# Patient Record
Sex: MALE | Race: WHITE | NOT HISPANIC OR LATINO | Employment: FULL TIME | URBAN - METROPOLITAN AREA
[De-identification: names, ages, dates, MRNs, and addresses within clinical notes are randomized per-mention and may not be internally consistent; named-entity substitution may affect disease eponyms.]

---

## 2023-05-10 PROBLEM — I10 HTN (HYPERTENSION): Status: ACTIVE | Noted: 2023-05-10

## 2023-05-10 PROBLEM — G47.33 OSA ON CPAP: Status: ACTIVE | Noted: 2023-05-10

## 2023-05-10 PROBLEM — M70.20 OLECRANON BURSITIS: Status: ACTIVE | Noted: 2023-05-10

## 2023-05-10 PROBLEM — N13.8 BENIGN LOCALIZED HYPERPLASIA OF PROSTATE WITH URINARY OBSTRUCTION: Status: ACTIVE | Noted: 2023-05-10

## 2023-05-10 PROBLEM — N39.0 URINARY TRACT INFECTION: Status: ACTIVE | Noted: 2023-05-10

## 2023-05-10 PROBLEM — M25.519 SHOULDER PAIN: Status: ACTIVE | Noted: 2023-05-10

## 2023-05-10 PROBLEM — M17.12 LEFT KNEE DJD: Status: ACTIVE | Noted: 2023-05-10

## 2023-05-10 PROBLEM — E11.9 DM2 (DIABETES MELLITUS, TYPE 2) (MULTI): Status: ACTIVE | Noted: 2023-05-10

## 2023-05-10 PROBLEM — R82.89 ABNORMAL URINE CYTOLOGY: Status: ACTIVE | Noted: 2023-05-10

## 2023-05-10 PROBLEM — E78.2 HYPERLIPIDEMIA, MIXED: Status: ACTIVE | Noted: 2023-05-10

## 2023-05-10 PROBLEM — R06.02 EXERCISE-INDUCED SHORTNESS OF BREATH: Status: ACTIVE | Noted: 2023-05-10

## 2023-05-10 PROBLEM — R07.9 CHEST PAIN: Status: ACTIVE | Noted: 2023-05-10

## 2023-05-10 PROBLEM — L03.119 CELLULITIS OF FOREARM: Status: ACTIVE | Noted: 2023-05-10

## 2023-05-10 PROBLEM — I49.3 PVC (PREMATURE VENTRICULAR CONTRACTION): Status: ACTIVE | Noted: 2023-05-10

## 2023-05-10 PROBLEM — R35.1 NOCTURIA: Status: ACTIVE | Noted: 2023-05-10

## 2023-05-10 PROBLEM — R39.11 URINARY HESITANCY: Status: ACTIVE | Noted: 2023-05-10

## 2023-05-10 PROBLEM — N20.0 NEPHROLITHIASIS: Status: ACTIVE | Noted: 2023-05-10

## 2023-05-10 PROBLEM — R33.9 URINARY RETENTION: Status: ACTIVE | Noted: 2023-05-10

## 2023-05-10 PROBLEM — R97.20 ELEVATED PSA: Status: ACTIVE | Noted: 2023-05-10

## 2023-05-10 PROBLEM — M75.100 ROTATOR CUFF TEAR: Status: ACTIVE | Noted: 2023-05-10

## 2023-05-10 PROBLEM — E66.01 OBESITY, MORBID (MULTI): Status: ACTIVE | Noted: 2023-05-10

## 2023-05-10 PROBLEM — R89.7 ABNORMAL PROSTATE BIOPSY: Status: ACTIVE | Noted: 2023-05-10

## 2023-05-10 PROBLEM — M48.061 DEGENERATIVE LUMBAR SPINAL STENOSIS: Status: ACTIVE | Noted: 2023-05-10

## 2023-05-10 PROBLEM — R00.2 PALPITATIONS: Status: ACTIVE | Noted: 2023-05-10

## 2023-05-10 PROBLEM — N52.9 ERECTILE DYSFUNCTION: Status: ACTIVE | Noted: 2023-05-10

## 2023-05-10 PROBLEM — N40.1 BENIGN LOCALIZED HYPERPLASIA OF PROSTATE WITH URINARY OBSTRUCTION: Status: ACTIVE | Noted: 2023-05-10

## 2023-05-10 PROBLEM — M25.569 KNEE PAIN: Status: ACTIVE | Noted: 2023-05-10

## 2023-05-10 RX ORDER — SEMAGLUTIDE 1.34 MG/ML
1 INJECTION, SOLUTION SUBCUTANEOUS
COMMUNITY
Start: 2022-02-03 | End: 2024-04-02 | Stop reason: DRUGHIGH

## 2023-05-10 RX ORDER — LANOLIN ALCOHOL/MO/W.PET/CERES
400 CREAM (GRAM) TOPICAL DAILY
COMMUNITY
Start: 2022-05-09 | End: 2024-04-02 | Stop reason: WASHOUT

## 2023-05-10 RX ORDER — METFORMIN HYDROCHLORIDE 500 MG/1
500 TABLET ORAL
COMMUNITY
End: 2024-04-02 | Stop reason: WASHOUT

## 2023-05-10 RX ORDER — LOSARTAN POTASSIUM 25 MG/1
25 TABLET ORAL DAILY
COMMUNITY
Start: 2018-09-11

## 2023-05-10 RX ORDER — ROSUVASTATIN CALCIUM 10 MG/1
10 TABLET, COATED ORAL DAILY
COMMUNITY
Start: 2022-05-09

## 2023-05-10 RX ORDER — TIZANIDINE 4 MG/1
4 TABLET ORAL EVERY 8 HOURS PRN
COMMUNITY
Start: 2021-07-14

## 2023-05-10 RX ORDER — PROPRANOLOL HYDROCHLORIDE 20 MG/1
10 TABLET ORAL 2 TIMES DAILY
COMMUNITY
Start: 2018-12-20

## 2023-05-10 RX ORDER — GABAPENTIN 100 MG/1
100 CAPSULE ORAL 2 TIMES DAILY
COMMUNITY
Start: 2022-05-09 | End: 2023-05-17 | Stop reason: DRUGHIGH

## 2023-05-10 RX ORDER — CELECOXIB 200 MG/1
200 CAPSULE ORAL 2 TIMES DAILY
COMMUNITY

## 2023-05-14 ASSESSMENT — ENCOUNTER SYMPTOMS
SPEECH DIFFICULTY: 0
HUNGER: 0
WEIGHT LOSS: 0
FATIGUE: 0
SWEATS: 0
TREMORS: 0
POLYPHAGIA: 0
NERVOUS/ANXIOUS: 0
WEAKNESS: 0
DIZZINESS: 0
SEIZURES: 0
BLURRED VISION: 0
BLACKOUTS: 0
HEADACHES: 0
VISUAL CHANGE: 0
CONFUSION: 0
POLYDIPSIA: 0

## 2023-05-15 LAB — PROSTATE SPECIFIC AG (NG/ML) IN SER/PLAS: 1.08 NG/ML (ref 0–4)

## 2023-05-17 ENCOUNTER — OFFICE VISIT (OUTPATIENT)
Dept: PRIMARY CARE | Facility: CLINIC | Age: 73
End: 2023-05-17
Payer: MEDICARE

## 2023-05-17 VITALS
DIASTOLIC BLOOD PRESSURE: 79 MMHG | BODY MASS INDEX: 31.4 KG/M2 | HEART RATE: 76 BPM | OXYGEN SATURATION: 95 % | TEMPERATURE: 98.6 F | WEIGHT: 207.2 LBS | HEIGHT: 68 IN | SYSTOLIC BLOOD PRESSURE: 138 MMHG

## 2023-05-17 DIAGNOSIS — I49.3 PVC (PREMATURE VENTRICULAR CONTRACTION): ICD-10-CM

## 2023-05-17 DIAGNOSIS — R00.2 PALPITATIONS: ICD-10-CM

## 2023-05-17 DIAGNOSIS — N13.8 BENIGN LOCALIZED HYPERPLASIA OF PROSTATE WITH URINARY OBSTRUCTION: ICD-10-CM

## 2023-05-17 DIAGNOSIS — E78.2 HYPERLIPIDEMIA, MIXED: ICD-10-CM

## 2023-05-17 DIAGNOSIS — E66.9 CLASS 1 OBESITY WITH SERIOUS COMORBIDITY AND BODY MASS INDEX (BMI) OF 31.0 TO 31.9 IN ADULT, UNSPECIFIED OBESITY TYPE: ICD-10-CM

## 2023-05-17 DIAGNOSIS — E11.42 TYPE 2 DIABETES MELLITUS WITH DIABETIC POLYNEUROPATHY, WITHOUT LONG-TERM CURRENT USE OF INSULIN (MULTI): ICD-10-CM

## 2023-05-17 DIAGNOSIS — N40.1 BENIGN LOCALIZED HYPERPLASIA OF PROSTATE WITH URINARY OBSTRUCTION: ICD-10-CM

## 2023-05-17 DIAGNOSIS — M48.061 DEGENERATIVE LUMBAR SPINAL STENOSIS: ICD-10-CM

## 2023-05-17 DIAGNOSIS — I10 PRIMARY HYPERTENSION: Primary | ICD-10-CM

## 2023-05-17 DIAGNOSIS — G47.33 OSA ON CPAP: ICD-10-CM

## 2023-05-17 PROBLEM — R07.9 CHEST PAIN: Status: RESOLVED | Noted: 2023-05-10 | Resolved: 2023-05-17

## 2023-05-17 PROBLEM — R35.1 NOCTURIA: Status: RESOLVED | Noted: 2023-05-10 | Resolved: 2023-05-17

## 2023-05-17 PROBLEM — R39.11 URINARY HESITANCY: Status: RESOLVED | Noted: 2023-05-10 | Resolved: 2023-05-17

## 2023-05-17 PROBLEM — N39.0 URINARY TRACT INFECTION: Status: RESOLVED | Noted: 2023-05-10 | Resolved: 2023-05-17

## 2023-05-17 PROBLEM — R33.9 URINARY RETENTION: Status: RESOLVED | Noted: 2023-05-10 | Resolved: 2023-05-17

## 2023-05-17 PROBLEM — E66.811 CLASS 1 OBESITY WITH SERIOUS COMORBIDITY AND BODY MASS INDEX (BMI) OF 31.0 TO 31.9 IN ADULT: Status: ACTIVE | Noted: 2023-05-10

## 2023-05-17 PROBLEM — L03.119 CELLULITIS OF FOREARM: Status: RESOLVED | Noted: 2023-05-10 | Resolved: 2023-05-17

## 2023-05-17 PROCEDURE — 3044F HG A1C LEVEL LT 7.0%: CPT | Performed by: FAMILY MEDICINE

## 2023-05-17 PROCEDURE — 3078F DIAST BP <80 MM HG: CPT | Performed by: FAMILY MEDICINE

## 2023-05-17 PROCEDURE — 1159F MED LIST DOCD IN RCRD: CPT | Performed by: FAMILY MEDICINE

## 2023-05-17 PROCEDURE — 99214 OFFICE O/P EST MOD 30 MIN: CPT | Performed by: FAMILY MEDICINE

## 2023-05-17 PROCEDURE — 3075F SYST BP GE 130 - 139MM HG: CPT | Performed by: FAMILY MEDICINE

## 2023-05-17 PROCEDURE — 1036F TOBACCO NON-USER: CPT | Performed by: FAMILY MEDICINE

## 2023-05-17 PROCEDURE — 4010F ACE/ARB THERAPY RXD/TAKEN: CPT | Performed by: FAMILY MEDICINE

## 2023-05-17 PROCEDURE — 3008F BODY MASS INDEX DOCD: CPT | Performed by: FAMILY MEDICINE

## 2023-05-17 RX ORDER — LANCETS 30 GAUGE
EACH MISCELLANEOUS
COMMUNITY
Start: 2022-11-19

## 2023-05-17 RX ORDER — GABAPENTIN 100 MG/1
200 CAPSULE ORAL NIGHTLY
Status: SHIPPED
Start: 2023-05-17 | End: 2023-08-24 | Stop reason: DRUGHIGH

## 2023-05-17 RX ORDER — BLOOD SUGAR DIAGNOSTIC
STRIP MISCELLANEOUS
COMMUNITY
Start: 2022-11-19

## 2023-05-17 RX ORDER — LANCETS 33 GAUGE
EACH MISCELLANEOUS
COMMUNITY
Start: 2022-11-19

## 2023-05-17 ASSESSMENT — ENCOUNTER SYMPTOMS
BLACKOUTS: 0
WEIGHT LOSS: 0
SWEATS: 0
VISUAL CHANGE: 0
BLURRED VISION: 0
HUNGER: 0

## 2023-05-17 ASSESSMENT — PATIENT HEALTH QUESTIONNAIRE - PHQ9
1. LITTLE INTEREST OR PLEASURE IN DOING THINGS: NOT AT ALL
SUM OF ALL RESPONSES TO PHQ9 QUESTIONS 1 AND 2: 0
2. FEELING DOWN, DEPRESSED OR HOPELESS: NOT AT ALL

## 2023-05-17 NOTE — PATIENT INSTRUCTIONS
REFERRED TO DR. IRAHETA FOR AN OPINION ON THE SPINAL STENOSIS IN THE LOWER SPINE.    Follow up in 3 months with labs to be done PRIOR.    It was a pleasure to see you today. Thank you for choosing us for your health care needs.    If you have lab or other testing completed and have not been informed of results within one week, please call the office for your results.    If you haven't done so, consider signing up for [facility], the [facility] personal health record. Ask the staff how you can get started.

## 2023-05-17 NOTE — PROGRESS NOTES
Subjective   Patient ID: Nabor Baldwin is a 72 y.o. male who presents for follow up visit    Diabetes  He has type 2 diabetes mellitus. No MedicAlert identification noted. The initial diagnosis of diabetes was made 2000 years ago. Pertinent negatives for hypoglycemia include no hunger, mood changes, sleepiness or sweats. Associated symptoms include foot paresthesias. Pertinent negatives for diabetes include no blurred vision, no foot ulcerations, no visual change and no weight loss. Pertinent negatives for hypoglycemia complications include no blackouts, no hospitalization, no nocturnal hypoglycemia, no required assistance and no required glucagon injection. Symptoms are stable. Diabetic complications include impotence, peripheral neuropathy and PVD. Pertinent negatives for diabetic complications include no CVA, heart disease, nephropathy or retinopathy. Risk factors for coronary artery disease include family history and hypertension. Current diabetic treatment includes oral agent (dual therapy). He is compliant with treatment all of the time. His weight is stable. He is following a generally healthy diet. When asked about meal planning, he reported none. He has not had a previous visit with a dietitian. He participates in exercise daily. He monitors blood glucose at home 1-2 x per week. Blood glucose monitoring compliance is fair. His home blood glucose trend is decreasing steadily. His breakfast blood glucose is taken between 7-8 am. His breakfast blood glucose range is generally 110-130 mg/dl. His dinner blood glucose range is generally 110-130 mg/dl. His overall blood glucose range is 110-130 mg/dl. He sees a podiatrist.Eye exam is not current.          He has hypertension.  Patient does monitor his BP at home occasionally.  Denies SOB, CP, and dizziness.  Patient is compliant with his antihypertensive therapy and denies any noted side effects.     He has hyperlipidemia.  Patient tries to limit the amount of  fatty foods and high cholesterol foods that he consumes.  Patient is compliant with his atorvastatin and denies any currently noted side effects.  He did not tolerate the 40 mg dose of the atorvastatin and could not tolerate the 20 mg either.      He has DM Type 2..   The patient does try to limit the amount of carbohydrates that he consumes in his diet.  He does monitor sugars at home occasionally but not on an everyday basis.  Pt reports neuropathy in feet bilateral.     Patient has degenerative lumbar spinal stenosis.   He has undergone injections in the past which have worked well.  His symptoms are currently stable.   Pt continues to utilize Celebrex with good results.     He has obstructive sleep apnea.  Patient is compliant with the use of his CPAP on a nightly basis.  Patient continues to benefit from CPAP therapy.  He does awaken feeling refreshed.     Patient has BPH with some associated urinary retention.  He followed with urology for both urinary retention and BPH (Dr. Lou)  He underwent HOLMIUM LASER ENUCLEATION OF THE PROSTATE 2/4/22 with resolution of his urinary symptoms.     Patient was previously diagnosed with palpitations which were diagnosed to be PVCs.  He was evaluated by cardiology and started on propranolol at bedtime.  This continues to work well with suppressing the PVCs/palpitations.     Review of Systems  Constitutional: Patient denies any fever, chills, loss of appetite, or unexplained weight loss.  Cardiovascular: Patient denies any chest pain, shortness of breath with exertion, tachycardia, palpitations, orthopnea, or paroxysmal nocturnal dyspnea.  Respiratory: Patient denies any cough, shortness breath, or wheezing.  Gastrointestinal: Patient denies any nausea, vomiting, diarrhea, constipation, melena, hematochezia, or reflux symptoms  Skin: Denies any rashes or skin lesions      Neurology: Patient denies any new motor or sensory losses. Denies any tingling, weakness, and  "incoordination of the extremities. Patient also denies any tremor, seizures, or gait instability. Pt states neuropathy in his feet bilaterally is worsening as noted in HPI.     Endocrinology: Denies any polyuria, polydipsia, polyphagia, or heat/cold intolerance.     SEE HISTORY OF PRESENT ILLNESS ALSO  REVIEW OF SYSTEMS IS OTHERWISE NEGATIVE.          Objective   /79   Pulse 76   Temp 37 °C (98.6 °F)   Ht 1.727 m (5' 8\")   Wt 94 kg (207 lb 3.2 oz)   SpO2 95%   BMI 31.50 kg/m²     Physical Exam: General Appearance: Alert and cooperative, in no acute distress, well-developed/well-nourished obese male.      Head: Normocephalic and atraumatic  Neck: Supple and without adenopathy or rigidity. There is no JVD at 90° and no carotid bruits are noted. There is no thyromegaly, thyroid tenderness, or palpable thyroid nodules.  Heart: Regular rate and rhythm without murmur or ectopy.  Respiratory: Lungs are clear to auscultation bilaterally with good air exchange. Good respiratory effort no accessory muscle use.  Skin: Good turgor, moist, warm and without rashes or lesions.  Neurological exam: Alert and oriented x3, no tremor, normal gait.  Extremities: No clubbing, cyanosis, or edema.      ENT: Mucous membranes are moist, external auditory canals and tympanic membranes are within normal limits bilaterally. Pharynx is without erythema or exudate. There is no noted rhinorrhea.  Lymph: No noted cervical, clavicular, axillary, or inguinal adenopathy.  Abdomen: Soft, nontender/nondistended. No masses, guarding, rebound, or rigidity. Bowel sounds are normal. No abdominal bruits. No CVA tenderness. There is no widening of the aortic pulsation.  Psychiatric: Appropriate mood and affect, good insight and judgment, no delusions or thought disorders, no suicidal or homicidal ideation     Assessment/Plan     HTN: Blood pressure appears adequately controlled and we will continue with the current antihypertensive " therapy.    Hyperlipidemia: Stable based on recent labs.  Tolerating current medications. We will continue current medications.  Dietary changes, exercise, and maintenance of healthy weight were discussed.    Diabetes mellitus type 2: Most recent labs reveal an A1c of 6.[facility]% [facility].  Dietary changes, exercise, and maintenance of a healthy weight were discussed.     Obstructive sleep apnea: He will continue nightly use of his CPAP machine.  He is compliant with nightly use of CPAP use and has been on CPAP for years now.    Degenerative spinal stenosis at L4/L5: Stable based on symptoms at this time.   We will continue the Celebrex as it continues to provide significant improvement in his pain levels.  5/17/2023:  Pt has requested referral to neurosurgeon for evaluation and treatment options for his stenosis.    BPH / Urinary retention: Currently on the finasteride and tamsulosin.  He will continue to follow up with urology as directed.   HE HAD LASER ENUCLEATION OF THE PROSTATE BY DR. VILLELA 2/4/2022 with resolution of his urinary symptoms.  5/17/2023:  Continues to do well.    Palpitations / PVCs: Patient is currently on propranolol at at bedtime and this continues to work well for him.    Obesity: Dietary changes, exercise, and maintenance of a healthy weight were discussed.      COLOGUARD DUE 7/22/2023  PSA DONE 5/15/2023 - managed by Urologist (Dr. Villela).    Scribe Attestation  By signing my name below, I, Asher Hernandez , Scribe   attest that this documentation has been prepared under the direction and in the presence of Dr. Maynard.

## 2023-08-12 LAB
ALANINE AMINOTRANSFERASE (SGPT) (U/L) IN SER/PLAS: 23 U/L (ref 10–52)
ALBUMIN (G/DL) IN SER/PLAS: 4.5 G/DL (ref 3.4–5)
ALKALINE PHOSPHATASE (U/L) IN SER/PLAS: 69 U/L (ref 33–136)
ANION GAP IN SER/PLAS: 13 MMOL/L (ref 10–20)
ASPARTATE AMINOTRANSFERASE (SGOT) (U/L) IN SER/PLAS: 26 U/L (ref 9–39)
BILIRUBIN TOTAL (MG/DL) IN SER/PLAS: 0.4 MG/DL (ref 0–1.2)
CALCIUM (MG/DL) IN SER/PLAS: 9.3 MG/DL (ref 8.6–10.3)
CARBON DIOXIDE, TOTAL (MMOL/L) IN SER/PLAS: 27 MMOL/L (ref 21–32)
CHLORIDE (MMOL/L) IN SER/PLAS: 105 MMOL/L (ref 98–107)
CHOLESTEROL (MG/DL) IN SER/PLAS: 132 MG/DL (ref 0–199)
CHOLESTEROL IN HDL (MG/DL) IN SER/PLAS: 40.2 MG/DL
CHOLESTEROL/HDL RATIO: 3.3
CREATININE (MG/DL) IN SER/PLAS: 1.03 MG/DL (ref 0.5–1.3)
ESTIMATED AVERAGE GLUCOSE FOR HBA1C: 143 MG/DL
GFR MALE: 77 ML/MIN/1.73M2
GLUCOSE (MG/DL) IN SER/PLAS: 122 MG/DL (ref 74–99)
HEMOGLOBIN A1C/HEMOGLOBIN TOTAL IN BLOOD: 6.6 %
LDL: 42 MG/DL (ref 0–99)
NON HDL CHOLESTEROL: 92 MG/DL
POTASSIUM (MMOL/L) IN SER/PLAS: 4.3 MMOL/L (ref 3.5–5.3)
PROTEIN TOTAL: 7.3 G/DL (ref 6.4–8.2)
SODIUM (MMOL/L) IN SER/PLAS: 141 MMOL/L (ref 136–145)
TRIGLYCERIDE (MG/DL) IN SER/PLAS: 248 MG/DL (ref 0–149)
UREA NITROGEN (MG/DL) IN SER/PLAS: 16 MG/DL (ref 6–23)
VLDL: 50 MG/DL (ref 0–40)

## 2023-08-17 ENCOUNTER — APPOINTMENT (OUTPATIENT)
Dept: PRIMARY CARE | Facility: CLINIC | Age: 73
End: 2023-08-17
Payer: MEDICARE

## 2023-08-24 ENCOUNTER — OFFICE VISIT (OUTPATIENT)
Dept: PRIMARY CARE | Facility: CLINIC | Age: 73
End: 2023-08-24
Payer: MEDICARE

## 2023-08-24 VITALS
WEIGHT: 207.4 LBS | HEIGHT: 68 IN | SYSTOLIC BLOOD PRESSURE: 116 MMHG | OXYGEN SATURATION: 94 % | HEART RATE: 70 BPM | DIASTOLIC BLOOD PRESSURE: 70 MMHG | TEMPERATURE: 98.2 F | BODY MASS INDEX: 31.43 KG/M2

## 2023-08-24 DIAGNOSIS — G47.33 OSA ON CPAP: ICD-10-CM

## 2023-08-24 DIAGNOSIS — M48.061 DEGENERATIVE LUMBAR SPINAL STENOSIS: ICD-10-CM

## 2023-08-24 DIAGNOSIS — N40.1 BENIGN LOCALIZED HYPERPLASIA OF PROSTATE WITH URINARY OBSTRUCTION: ICD-10-CM

## 2023-08-24 DIAGNOSIS — N13.8 BENIGN LOCALIZED HYPERPLASIA OF PROSTATE WITH URINARY OBSTRUCTION: ICD-10-CM

## 2023-08-24 DIAGNOSIS — E78.2 HYPERLIPIDEMIA, MIXED: ICD-10-CM

## 2023-08-24 DIAGNOSIS — I49.3 PVC (PREMATURE VENTRICULAR CONTRACTION): ICD-10-CM

## 2023-08-24 DIAGNOSIS — E11.42 TYPE 2 DIABETES MELLITUS WITH DIABETIC POLYNEUROPATHY, WITHOUT LONG-TERM CURRENT USE OF INSULIN (MULTI): ICD-10-CM

## 2023-08-24 DIAGNOSIS — R00.2 PALPITATIONS: ICD-10-CM

## 2023-08-24 DIAGNOSIS — I10 PRIMARY HYPERTENSION: Primary | ICD-10-CM

## 2023-08-24 DIAGNOSIS — E66.9 CLASS 1 OBESITY WITH SERIOUS COMORBIDITY AND BODY MASS INDEX (BMI) OF 31.0 TO 31.9 IN ADULT, UNSPECIFIED OBESITY TYPE: ICD-10-CM

## 2023-08-24 PROCEDURE — 4010F ACE/ARB THERAPY RXD/TAKEN: CPT | Performed by: FAMILY MEDICINE

## 2023-08-24 PROCEDURE — 99214 OFFICE O/P EST MOD 30 MIN: CPT | Performed by: FAMILY MEDICINE

## 2023-08-24 PROCEDURE — 3078F DIAST BP <80 MM HG: CPT | Performed by: FAMILY MEDICINE

## 2023-08-24 PROCEDURE — 1036F TOBACCO NON-USER: CPT | Performed by: FAMILY MEDICINE

## 2023-08-24 PROCEDURE — 1125F AMNT PAIN NOTED PAIN PRSNT: CPT | Performed by: FAMILY MEDICINE

## 2023-08-24 PROCEDURE — 1160F RVW MEDS BY RX/DR IN RCRD: CPT | Performed by: FAMILY MEDICINE

## 2023-08-24 PROCEDURE — 3008F BODY MASS INDEX DOCD: CPT | Performed by: FAMILY MEDICINE

## 2023-08-24 PROCEDURE — 3044F HG A1C LEVEL LT 7.0%: CPT | Performed by: FAMILY MEDICINE

## 2023-08-24 PROCEDURE — 1159F MED LIST DOCD IN RCRD: CPT | Performed by: FAMILY MEDICINE

## 2023-08-24 PROCEDURE — 3074F SYST BP LT 130 MM HG: CPT | Performed by: FAMILY MEDICINE

## 2023-08-24 RX ORDER — GABAPENTIN 300 MG/1
300 CAPSULE ORAL 2 TIMES DAILY
Status: SHIPPED
Start: 2023-08-24

## 2023-08-24 RX ORDER — ICOSAPENT ETHYL 1 G/1
2 CAPSULE ORAL
COMMUNITY

## 2023-08-24 ASSESSMENT — PATIENT HEALTH QUESTIONNAIRE - PHQ9
2. FEELING DOWN, DEPRESSED OR HOPELESS: NOT AT ALL
SUM OF ALL RESPONSES TO PHQ9 QUESTIONS 1 AND 2: 0
1. LITTLE INTEREST OR PLEASURE IN DOING THINGS: NOT AT ALL

## 2023-08-24 NOTE — PROGRESS NOTES
Subjective   Patient ID: Nabor Baldwin is a 73 y.o. male who presents for Follow-up.    [facility]     8/24/2023:  Has now been diagnosed with [facility] of the [facility].    Planning an MRI of the lumbar spine (ordered by Dr. Badillo) and may undergo surgery depending on the results.        He has hypertension.  Patient does monitor his BP at home occasionally.  Denies SOB, CP, and dizziness.  Patient is compliant with his antihypertensive therapy and denies any noted side effects.     He has hyperlipidemia.  Patient tries to limit the amount of fatty foods and high cholesterol foods that he consumes.  Patient is compliant with his statin therapy and denies any currently noted side effects.     He has DM Type 2 with diabetic neuropathy.  The patient does try to limit the amount of carbohydrates that he consumes in his diet.  He does monitor sugars at home occasionally but not on an everyday basis.  Pt reports neuropathy in feet bilateral.     Patient has degenerative lumbar spinal stenosis.   He has undergone injections in the past which have worked well.  His symptoms are currently stable.   Pt continues to utilize Celebrex with good results.     He has obstructive sleep apnea.  Patient is compliant with the use of his CPAP on a nightly basis.  Patient continues to benefit from CPAP therapy.  He does awaken feeling refreshed.     Patient has BPH with a history of urinary retention.  He underwent HOLMIUM LASER ENUCLEATION OF THE PROSTATE 2/4/22 by Dr. Lou.  He has had significant reduction in obstructive symptoms.     Patient previously experienced palpitations.  Cardiology diagnosed him with PVCs and started him on propranolol at bedtime.  This continues to work well with suppressing the PVCs/palpitations.       Review of Systems  Constitutional: Patient denies any fever, chills, loss of appetite, or unexplained weight loss.  Cardiovascular: Patient denies any chest pain, shortness of breath with  "exertion, tachycardia, palpitations, orthopnea, or paroxysmal nocturnal dyspnea.  Respiratory: Patient denies any cough, shortness breath, or wheezing.  Gastrointestinal: Patient denies any nausea, vomiting, diarrhea, constipation, melena, hematochezia, or reflux symptoms.  Skin: Denies any rashes or skin lesions.   Neurology: Patient denies any new motor or sensory losses.  Denies any numbness, tingling, weakness, and incoordination of the extremities.  Patient also denies any tremor, seizures, or gait instability.  Endocrinology: Denies any polyuria, polydipsia, polyphagia, or heat/cold intolerance.      Objective   /78   Pulse 70   Temp 36.8 °C (98.2 °F)   Ht 1.727 m (5' 8\")   Wt 94.1 kg (207 lb 6.4 oz)   SpO2 94%   BMI 31.54 kg/m²     Physical Exam  General Appearance: Alert and cooperative, in no acute distress, well-developed/well-nourished.  Neck: Supple and without adenopathy or rigidity.  There is no JVD at 90° and no carotid bruits are noted.  There is no thyromegaly, thyroid tenderness, or palpable thyroid nodules.  Heart: Regular rate and rhythm without murmur or ectopy.  Respiratory: Lungs are clear to auscultation bilaterally with good air exchange.  Good respiratory effort and no accessory muscle use.  Skin: Good turgor, moist, warm and without rashes or lesions.  Neurological exam: Alert and oriented ×3, no tremor, normal gait.  Extremities: No clubbing, cyanosis, or edema      Assessment/Plan   HTN: Stable in office readings.  Blood pressure appears adequately controlled and we will continue with the current antihypertensive therapy.     Hyperlipidemia: Stable based on recent labs.  Tolerating current medications. We will continue current medications.  Dietary changes, exercise, and maintenance of healthy weight were discussed.     Diabetes mellitus type 2 with neuropathy: Most recent labs reveal an A1c of 6.6%.  Dietary changes, exercise, and maintenance of a healthy weight were " discussed.      Obstructive sleep apnea: Stable based on symptoms.  He will continue nightly use of his CPAP machine.  He is compliant with nightly use of CPAP use and has been on CPAP for years now.     Degenerative spinal stenosis at L4/L5: Stable based on symptoms.  We will continue the Celebrex as it continues to provide significant improvement in his pain levels.  8/24/2023: Planning an MRI of the lumbar spine (ordered by Dr. Badillo) and may undergo surgery depending on the results.    BPH / Hx of Urinary retention: Currently on the finasteride and tamsulosin.  He will continue to follow up with urology as directed.   HE UNDERWENT LASER ENUCLEATION OF THE PROSTATE BY DR. VILLELA IN THE PAST.  Continues to do well.     Palpitations / PVCs: Patient is currently on propranolol at at bedtime and this continues to work well for him.     Obesity: Dietary changes, exercise, and maintenance of a healthy weight were discussed at length.  Goal is to achieve a BMI less than 25.          COLOGUARD DUE 7/22/2023  PSA DONE 5/15/2023 - managed by Urologist (Dr. Villela).      Orders Placed This Encounter   Procedures    Hemoglobin A1C    Basic Metabolic Panel    TSH with reflex to Free T4 if abnormal     Requested Prescriptions     Signed Prescriptions Disp Refills    gabapentin (Neurontin) 300 mg capsule       Sig: Take 1 capsule (300 mg) by mouth 2 times a day.

## 2023-08-24 NOTE — PATIENT INSTRUCTIONS
Follow up in 3 months with labs to be done PRIOR.    It was a pleasure to see you today. Thank you for choosing us for your health care needs.    If you have lab or other testing completed and have not been informed of results within one week, please call the office for your results.    If you haven't done so, consider signing up for [facility] [employer], the [facility] personal health record. Ask the staff how you can get started.

## 2023-09-28 ENCOUNTER — HOSPITAL ENCOUNTER (OUTPATIENT)
Dept: DATA CONVERSION | Facility: HOSPITAL | Age: 73
Discharge: HOME | End: 2023-09-28
Attending: PHYSICAL MEDICINE & REHABILITATION
Payer: MEDICARE

## 2023-09-28 ENCOUNTER — HOSPITAL ENCOUNTER (OUTPATIENT)
Facility: REHABILITATION | Age: 73
End: 2023-10-11

## 2023-09-28 DIAGNOSIS — M21.371 RIGHT FOOT DROP: ICD-10-CM

## 2023-09-28 DIAGNOSIS — E11.9 TYPE 2 DIABETES MELLITUS WITHOUT COMPLICATION, WITHOUT LONG-TERM CURRENT USE OF INSULIN (MULTI): ICD-10-CM

## 2023-09-28 DIAGNOSIS — M48.062 SPINAL STENOSIS OF LUMBAR REGION WITH NEUROGENIC CLAUDICATION: Primary | ICD-10-CM

## 2023-09-28 DIAGNOSIS — I49.3 PVC (PREMATURE VENTRICULAR CONTRACTION): ICD-10-CM

## 2023-09-28 DIAGNOSIS — I10 PRIMARY HYPERTENSION: ICD-10-CM

## 2023-09-28 PROCEDURE — 99222 1ST HOSP IP/OBS MODERATE 55: CPT | Performed by: INTERNAL MEDICINE

## 2023-09-29 LAB
ANION GAP IN SER/PLAS: 11 MMOL/L (ref 10–20)
ANION GAP IN SER/PLAS: 11 MMOL/L (ref 10–20)
CALCIUM (MG/DL) IN SER/PLAS: 9.4 MG/DL (ref 8.6–10.3)
CALCIUM (MG/DL) IN SER/PLAS: 9.4 MG/DL (ref 8.6–10.3)
CARBON DIOXIDE, TOTAL (MMOL/L) IN SER/PLAS: 29 MMOL/L (ref 21–32)
CARBON DIOXIDE, TOTAL (MMOL/L) IN SER/PLAS: 29 MMOL/L (ref 21–32)
CHLORIDE (MMOL/L) IN SER/PLAS: 100 MMOL/L (ref 98–107)
CHLORIDE (MMOL/L) IN SER/PLAS: 100 MMOL/L (ref 98–107)
CREATININE (MG/DL) IN SER/PLAS: 0.98 MG/DL (ref 0.5–1.3)
CREATININE (MG/DL) IN SER/PLAS: 0.98 MG/DL (ref 0.5–1.3)
ERYTHROCYTE DISTRIBUTION WIDTH (RATIO) BY AUTOMATED COUNT: 11.6 % (ref 11.5–14.5)
ERYTHROCYTE DISTRIBUTION WIDTH (RATIO) BY AUTOMATED COUNT: 11.6 % (ref 11.5–14.5)
ERYTHROCYTE MEAN CORPUSCULAR HEMOGLOBIN CONCENTRATION (G/DL) BY AUTOMATED: 33.2 G/DL (ref 32–36)
ERYTHROCYTE MEAN CORPUSCULAR HEMOGLOBIN CONCENTRATION (G/DL) BY AUTOMATED: 33.2 G/DL (ref 32–36)
ERYTHROCYTE MEAN CORPUSCULAR VOLUME (FL) BY AUTOMATED COUNT: 92 FL (ref 80–100)
ERYTHROCYTE MEAN CORPUSCULAR VOLUME (FL) BY AUTOMATED COUNT: 92 FL (ref 80–100)
ERYTHROCYTES (10*6/UL) IN BLOOD BY AUTOMATED COUNT: 4.13 X10E12/L (ref 4.5–5.9)
ERYTHROCYTES (10*6/UL) IN BLOOD BY AUTOMATED COUNT: 4.13 X10E12/L (ref 4.5–5.9)
GFR MALE: 82 ML/MIN/1.73M2
GFR MALE: 82 ML/MIN/1.73M2
GLUCOSE (MG/DL) IN SER/PLAS: 155 MG/DL (ref 74–99)
GLUCOSE (MG/DL) IN SER/PLAS: 155 MG/DL (ref 74–99)
HEMATOCRIT (%) IN BLOOD BY AUTOMATED COUNT: 38.2 % (ref 41–52)
HEMATOCRIT (%) IN BLOOD BY AUTOMATED COUNT: 38.2 % (ref 41–52)
HEMOGLOBIN (G/DL) IN BLOOD: 12.7 G/DL (ref 13.5–17.5)
HEMOGLOBIN (G/DL) IN BLOOD: 12.7 G/DL (ref 13.5–17.5)
LEUKOCYTES (10*3/UL) IN BLOOD BY AUTOMATED COUNT: 8.9 X10E9/L (ref 4.4–11.3)
LEUKOCYTES (10*3/UL) IN BLOOD BY AUTOMATED COUNT: 8.9 X10E9/L (ref 4.4–11.3)
PLATELETS (10*3/UL) IN BLOOD AUTOMATED COUNT: 348 X10E9/L (ref 150–450)
PLATELETS (10*3/UL) IN BLOOD AUTOMATED COUNT: 348 X10E9/L (ref 150–450)
POTASSIUM (MMOL/L) IN SER/PLAS: 4.2 MMOL/L (ref 3.5–5.3)
POTASSIUM (MMOL/L) IN SER/PLAS: 4.2 MMOL/L (ref 3.5–5.3)
SODIUM (MMOL/L) IN SER/PLAS: 136 MMOL/L (ref 136–145)
SODIUM (MMOL/L) IN SER/PLAS: 136 MMOL/L (ref 136–145)
UREA NITROGEN (MG/DL) IN SER/PLAS: 24 MG/DL (ref 6–23)
UREA NITROGEN (MG/DL) IN SER/PLAS: 24 MG/DL (ref 6–23)

## 2023-10-02 RX ORDER — HYDROCODONE BITARTRATE AND ACETAMINOPHEN 5; 325 MG/1; MG/1
2 TABLET ORAL EVERY 6 HOURS PRN
COMMUNITY
Start: 2023-09-20

## 2023-10-02 RX ORDER — DOCUSATE SODIUM 100 MG
100 CAPSULE ORAL AS NEEDED
COMMUNITY
Start: 2023-09-20

## 2023-10-04 ENCOUNTER — TELEPHONE (OUTPATIENT)
Dept: PRIMARY CARE | Facility: CLINIC | Age: 73
End: 2023-10-04

## 2023-10-04 NOTE — TELEPHONE ENCOUNTER
Wanted to let us know that he had emergency spinal stenosis surgery at [place], he stated his legs gave up on him was unable to walk at his work site. He is now back in [place] in rehab at the  rehab in [place].

## 2023-10-06 ENCOUNTER — HOME HEALTH ADMISSION (OUTPATIENT)
Dept: HOME HEALTH SERVICES | Facility: HOME HEALTH | Age: 73
End: 2023-10-06
Payer: MEDICARE

## 2023-10-09 ENCOUNTER — HOME HEALTH ADMISSION (OUTPATIENT)
Dept: HOME HEALTH SERVICES | Facility: HOME HEALTH | Age: 73
End: 2023-10-09
Payer: MEDICARE

## 2023-10-12 ENCOUNTER — HOME CARE VISIT (OUTPATIENT)
Dept: HOME HEALTH SERVICES | Facility: HOME HEALTH | Age: 73
End: 2023-10-12
Payer: MEDICARE

## 2023-10-12 VITALS — TEMPERATURE: 97.4 F

## 2023-10-12 PROCEDURE — G0151 HHCP-SERV OF PT,EA 15 MIN: HCPCS | Mod: HHH

## 2023-10-12 PROCEDURE — 0023 HH SOC

## 2023-10-12 PROCEDURE — G0299 HHS/HOSPICE OF RN EA 15 MIN: HCPCS | Mod: HHH

## 2023-10-12 PROCEDURE — 1090000002 HH PPS REVENUE DEBIT

## 2023-10-12 PROCEDURE — 1090000001 HH PPS REVENUE CREDIT

## 2023-10-12 PROCEDURE — 169592 NO-PAY CLAIM PROCEDURE

## 2023-10-12 PROCEDURE — G0152 HHCP-SERV OF OT,EA 15 MIN: HCPCS | Mod: HHH

## 2023-10-12 SDOH — HEALTH STABILITY: PHYSICAL HEALTH: EXERCISE ACTIVITIES SETS: 1

## 2023-10-12 SDOH — HEALTH STABILITY: PHYSICAL HEALTH: EXERCISE ACTIVITY: HIP EXTENSION

## 2023-10-12 SDOH — HEALTH STABILITY: PHYSICAL HEALTH: EXERCISE ACTIVITY: MINI-SQUATS

## 2023-10-12 SDOH — HEALTH STABILITY: PHYSICAL HEALTH: EXERCISE ACTIVITY: HEEL RAISES

## 2023-10-12 SDOH — HEALTH STABILITY: PHYSICAL HEALTH

## 2023-10-12 SDOH — HEALTH STABILITY: PHYSICAL HEALTH: PHYSICAL EXERCISE: STANDING

## 2023-10-12 SDOH — HEALTH STABILITY: PHYSICAL HEALTH: EXERCISE ACTIVITY: KNEE FLEXION

## 2023-10-12 SDOH — HEALTH STABILITY: PHYSICAL HEALTH: PHYSICAL EXERCISE: 6

## 2023-10-12 SDOH — HEALTH STABILITY: PHYSICAL HEALTH: PHYSICAL EXERCISE: 7

## 2023-10-12 SDOH — HEALTH STABILITY: PHYSICAL HEALTH: PHYSICAL EXERCISE: 8

## 2023-10-12 SDOH — HEALTH STABILITY: PHYSICAL HEALTH: EXERCISE ACTIVITY: HIP FLEXION

## 2023-10-12 SDOH — HEALTH STABILITY: PHYSICAL HEALTH: EXERCISE ACTIVITY: HIP ABDUCTION

## 2023-10-12 SDOH — HEALTH STABILITY: PHYSICAL HEALTH: EXERCISE TYPE: STANDING THER EX PROGRAM

## 2023-10-12 ASSESSMENT — PAIN SCALES - PAIN ASSESSMENT IN ADVANCED DEMENTIA (PAINAD)
FACIALEXPRESSION: 0
BODYLANGUAGE: 0 - RELAXED.
CONSOLABILITY: 0
NEGVOCALIZATION: 0 - NONE.
FACIALEXPRESSION: 0 - SMILING OR INEXPRESSIVE.
TOTALSCORE: 0
NEGVOCALIZATION: 0
BREATHING: 0
CONSOLABILITY: 0 - NO NEED TO CONSOLE.
BODYLANGUAGE: 0

## 2023-10-12 ASSESSMENT — ACTIVITIES OF DAILY LIVING (ADL)
DRESSING_UB_CURRENT_FUNCTION: INDEPENDENT
AMBULATION ASSISTANCE: 1
CURRENT_FUNCTION: STAND BY ASSIST
BATHING_CURRENT_FUNCTION: INDEPENDENT
AMBULATION ASSISTANCE: STAND BY ASSIST
AMBULATION ASSISTANCE: SUPERVISION
DRESSING_LB_CURRENT_FUNCTION: INDEPENDENT
BATHING ASSESSED: 1
AMBULATION ASSISTANCE ON FLAT SURFACES: 1

## 2023-10-12 ASSESSMENT — ENCOUNTER SYMPTOMS
OCCASIONAL FEELINGS OF UNSTEADINESS: 1
SUBJECTIVE PAIN PROGRESSION: RAPIDLY IMPROVING
PAIN LOCATION: BACK
PAIN LOCATION - PAIN QUALITY: DULL ACHE
PAIN SEVERITY GOAL: 0/10
PAIN LOCATION - PAIN SEVERITY: 0/10
HIGHEST PAIN SEVERITY IN PAST 24 HOURS: 2/10
LOSS OF SENSATION IN FEET: 0
DEPRESSION: 0
PAIN LOCATION - PAIN FREQUENCY: WITH ACTIVITY
PAIN: 1

## 2023-10-13 ENCOUNTER — OFFICE VISIT (OUTPATIENT)
Dept: NEUROSURGERY | Facility: CLINIC | Age: 73
End: 2023-10-13
Payer: MEDICARE

## 2023-10-13 VITALS
SYSTOLIC BLOOD PRESSURE: 128 MMHG | DIASTOLIC BLOOD PRESSURE: 88 MMHG | HEIGHT: 68 IN | WEIGHT: 201 LBS | HEART RATE: 83 BPM | BODY MASS INDEX: 30.46 KG/M2

## 2023-10-13 DIAGNOSIS — Z98.890 STATUS POST LUMBAR SPINE SURGERY FOR DECOMPRESSION OF SPINAL CORD: Primary | ICD-10-CM

## 2023-10-13 DIAGNOSIS — T81.31XA POSTOPERATIVE DEHISCENCE OF SKIN WOUND, INITIAL ENCOUNTER: ICD-10-CM

## 2023-10-13 PROCEDURE — 1125F AMNT PAIN NOTED PAIN PRSNT: CPT | Performed by: PHYSICIAN ASSISTANT

## 2023-10-13 PROCEDURE — 1036F TOBACCO NON-USER: CPT | Performed by: PHYSICIAN ASSISTANT

## 2023-10-13 PROCEDURE — 3074F SYST BP LT 130 MM HG: CPT | Performed by: PHYSICIAN ASSISTANT

## 2023-10-13 PROCEDURE — 1090000002 HH PPS REVENUE DEBIT

## 2023-10-13 PROCEDURE — 1160F RVW MEDS BY RX/DR IN RCRD: CPT | Performed by: PHYSICIAN ASSISTANT

## 2023-10-13 PROCEDURE — 3079F DIAST BP 80-89 MM HG: CPT | Performed by: PHYSICIAN ASSISTANT

## 2023-10-13 PROCEDURE — 1090000001 HH PPS REVENUE CREDIT

## 2023-10-13 PROCEDURE — 3008F BODY MASS INDEX DOCD: CPT | Performed by: PHYSICIAN ASSISTANT

## 2023-10-13 PROCEDURE — 99024 POSTOP FOLLOW-UP VISIT: CPT | Performed by: PHYSICIAN ASSISTANT

## 2023-10-13 PROCEDURE — 1159F MED LIST DOCD IN RCRD: CPT | Performed by: PHYSICIAN ASSISTANT

## 2023-10-13 PROCEDURE — 3044F HG A1C LEVEL LT 7.0%: CPT | Performed by: PHYSICIAN ASSISTANT

## 2023-10-13 PROCEDURE — 4010F ACE/ARB THERAPY RXD/TAKEN: CPT | Performed by: PHYSICIAN ASSISTANT

## 2023-10-13 RX ORDER — MULTIVIT-MIN/FERROUS SULFATE 4.5 MG
TABLET ORAL
COMMUNITY
Start: 2021-11-04

## 2023-10-13 RX ORDER — CEPHALEXIN 500 MG/1
500 CAPSULE ORAL 3 TIMES DAILY
Qty: 21 CAPSULE | Refills: 0 | Status: SHIPPED | OUTPATIENT
Start: 2023-10-13 | End: 2023-10-20

## 2023-10-13 RX ORDER — METFORMIN HYDROCHLORIDE EXTENDED-RELEASE TABLETS 500 MG/1
1000 TABLET, FILM COATED, EXTENDED RELEASE ORAL
COMMUNITY
Start: 2023-02-10 | End: 2024-04-02 | Stop reason: DRUGHIGH

## 2023-10-13 ASSESSMENT — PATIENT HEALTH QUESTIONNAIRE - PHQ9
2. FEELING DOWN, DEPRESSED OR HOPELESS: NOT AT ALL
1. LITTLE INTEREST OR PLEASURE IN DOING THINGS: NOT AT ALL
SUM OF ALL RESPONSES TO PHQ9 QUESTIONS 1 & 2: 0

## 2023-10-13 NOTE — PROGRESS NOTES
[facility]  Department of Neurological Surgery  Post Operative Patient Visit      History of Present Illness:  Nabor Baldwin is a 73 y.o. year old male who presents post likely L2-5 decompression (discectomy/laminectomy) at OSH in [place], [place] on 9/21/23. He returns today for routine postop care. Patient is doing remarkably well after severe stenosis bout and near cauda equina. Incision is well approximated and nonerythemic. There is a small area of edema and the cranial end with droplet visible, likely underlying seroma continuing. Out of abundance of caution, short antibiotic prescription sent to pharmacy. Asked patient to follow with photographs and let office know if worsens. He is ongoing through home PT,  and does continue with mild right foot drop (~4/5). Will schedule 3 month follow up with myself or Dr. Badillo whom he saw preoperatively.         14/14 systems reviewed and negative other than what is listed in the history of present illness    Patient Active Problem List   Diagnosis    Abnormal prostate biopsy    Abnormal urine cytology    Benign localized hyperplasia of prostate with urinary obstruction    Degenerative lumbar spinal stenosis    DM2 (diabetes mellitus, type 2) (CMS/[facility])    Elevated PSA    Erectile dysfunction    Exercise-induced shortness of breath    HTN (hypertension)    Hyperlipidemia, mixed    Knee pain    Left knee DJD    Nephrolithiasis    Class 1 obesity with serious comorbidity and body mass index (BMI) of 31.0 to 31.9 in adult    Olecranon bursitis    BRIGITTE on CPAP    Palpitations    PVC (premature ventricular contraction)    Rotator cuff tear    Shoulder pain     Past Medical History:   Diagnosis Date    Male erectile dysfunction, unspecified     Erectile dysfunction    Osteoarthritis of knee, unspecified     Osteoarthritis of knee    Personal history of other diseases of the circulatory system     History of essential hypertension    Personal history of  other diseases of the digestive system     History of esophageal reflux    Personal history of other diseases of the musculoskeletal system and connective tissue     History of arthritis    Unspecified visual loss     Vision problem     Past Surgical History:   Procedure Laterality Date    APPENDECTOMY  08/21/2015    Appendectomy    CATARACT EXTRACTION  08/21/2015    Cataract Surgery    OTHER SURGICAL HISTORY  08/21/2015    Repair Of Retinal Detachment Right Eye    TOTAL KNEE ARTHROPLASTY  08/21/2015    Knee Replacement    VASECTOMY  08/21/2015    Surgery Vas Deferens Vasectomy     Social History     Tobacco Use    Smoking status: Never    Smokeless tobacco: Never   Substance Use Topics    Alcohol use: Never     family history includes Diabetes in his father; Transient ischemic attack in his mother; cardiac disorder in his father.    Current Outpatient Medications:     metFORMIN, OSM, (Fortamet) 500 mg 24 hr tablet, 2 tablets (1,000 mg)., Disp: , Rfl:     multivit-min-ferrous sulfate (One Daily Multi-Vit w-Mineral) 4.5 mg iron tablet, TAKE  BY MOUTH EVERY DAY, Disp: , Rfl:     celecoxib (CeleBREX) 200 mg capsule, Take 1 capsule (200 mg) by mouth 2 times a day., Disp: , Rfl:     cephalexin (Keflex) 500 mg capsule, Take 1 capsule (500 mg) by mouth 3 times a day for 7 days., Disp: 21 capsule, Rfl: 0    gabapentin (Neurontin) 300 mg capsule, Take 1 capsule (300 mg) by mouth 2 times a day., Disp: , Rfl:     HYDROcodone-acetaminophen (Norco) 5-325 mg tablet, Take 2 tablets by mouth every 6 hours if needed., Disp: , Rfl:     icosapent ethyL (Vascepa) 1 gram capsule, Take 2 capsules (2 g) by mouth 2 times a day with meals., Disp: , Rfl:     losartan (Cozaar) 25 mg tablet, Take 1 tablet (25 mg) by mouth once daily., Disp: , Rfl:     magnesium oxide (Mag-Ox) 400 mg (241.3 mg magnesium) tablet, Take 1 tablet (400 mg) by mouth once daily., Disp: , Rfl:     metFORMIN (Glucophage) 500 mg tablet, Take 1 tablet (500 mg) by mouth  once daily with a meal., Disp: , Rfl:     OneTouch Delica Plus Lancet 33 gauge misc, CHECK BLOOD GLUCOSE EVERY DAY, Disp: , Rfl:     OneTouch Ultra Test strip, CHECK BLOOD GLUCOSE EVERY DAY, Disp: , Rfl:     OneTouch Ultra2 Meter misc, TEST BLOOD GLUCOSE EVERY DAY, Disp: , Rfl:     propranolol (Inderal) 20 mg tablet, Take 0.5 tablets (10 mg) by mouth 2 times a day., Disp: , Rfl:     rosuvastatin (Crestor) 10 mg tablet, Take 1 tablet (10 mg) by mouth once daily., Disp: , Rfl:     semaglutide (Ozempic) 1 mg/dose (4 mg/3 mL) pen injector, Inject 1 mg under the skin 1 (one) time per week., Disp: , Rfl:     Stool Softener 100 mg capsule, Take 1 capsule (100 mg) by mouth if needed., Disp: , Rfl:     tiZANidine (Zanaflex) 4 mg tablet, Take 1 tablet (4 mg) by mouth every 8 hours if needed., Disp: , Rfl:   Allergies   Allergen Reactions    Atorvastatin Other         The above clinical summary has been dictated with voice recognition software. It has not been proofread for grammatical errors, typographical mistakes, or other semantic inconsistencies.    Thank you for visiting our office today. It was our pleasure to take part in your healthcare.     Do not hesitate to call with any questions regarding your plan of care after leaving at [phone] M-F 8am-4pm.     To clinicians, thank you very much for this kind referral. It is a privilege to partner with you in the care of your patients. My office would be delighted to assist you with any further consultations or with questions regarding the plan of care outlined. Do not hesitate to call the office or contact me directly.       Sincerely,      ETTA Romeo, PAColtC  Associate Physician Assistant, Neurosurgery  Clinical   [facility] [place] [address]    Phone: [phone]  Fax: [phone]

## 2023-10-14 PROCEDURE — 1090000001 HH PPS REVENUE CREDIT

## 2023-10-14 PROCEDURE — 1090000002 HH PPS REVENUE DEBIT

## 2023-10-15 ENCOUNTER — HOME CARE VISIT (OUTPATIENT)
Dept: HOME HEALTH SERVICES | Facility: HOME HEALTH | Age: 73
End: 2023-10-15
Payer: MEDICARE

## 2023-10-15 VITALS
HEART RATE: 72 BPM | OXYGEN SATURATION: 99 % | HEIGHT: 78 IN | TEMPERATURE: 97.8 F | SYSTOLIC BLOOD PRESSURE: 126 MMHG | DIASTOLIC BLOOD PRESSURE: 70 MMHG | BODY MASS INDEX: 23.14 KG/M2 | RESPIRATION RATE: 20 BRPM | WEIGHT: 200 LBS

## 2023-10-15 PROCEDURE — 1090000001 HH PPS REVENUE CREDIT

## 2023-10-15 PROCEDURE — 1090000002 HH PPS REVENUE DEBIT

## 2023-10-15 ASSESSMENT — ENCOUNTER SYMPTOMS
HIGHEST PAIN SEVERITY IN PAST 24 HOURS: 2/10
PAIN SEVERITY GOAL: 0/10
DEPRESSION: 0
LAST BOWEL MOVEMENT: 66759
PAIN LOCATION - PAIN QUALITY: ACHING
SUBJECTIVE PAIN PROGRESSION: UNCHANGED
PAIN LOCATION: BACK
CHANGE IN APPETITE: UNCHANGED
PERSON REPORTING PAIN: PATIENT
PAIN LOCATION - PAIN SEVERITY: 2/10
LOSS OF SENSATION IN FEET: 0
PAIN LOCATION - PAIN FREQUENCY: INTERMITTENT
APPETITE LEVEL: GOOD
OCCASIONAL FEELINGS OF UNSTEADINESS: 0
LOWEST PAIN SEVERITY IN PAST 24 HOURS: 0/10
PAIN: 1

## 2023-10-15 ASSESSMENT — ACTIVITIES OF DAILY LIVING (ADL)
OASIS_M1830: 03
AMBULATION ASSISTANCE: 1
ENTERING_EXITING_HOME: NEEDS ASSISTANCE
AMBULATION ASSISTANCE: SUPERVISION

## 2023-10-15 ASSESSMENT — PAIN SCALES - PAIN ASSESSMENT IN ADVANCED DEMENTIA (PAINAD)
TOTALSCORE: 0
FACIALEXPRESSION: 0
CONSOLABILITY: 0 - NO NEED TO CONSOLE.
BREATHING: 0
BODYLANGUAGE: 0 - RELAXED.
NEGVOCALIZATION: 0
BODYLANGUAGE: 0
CONSOLABILITY: 0
NEGVOCALIZATION: 0 - NONE.
FACIALEXPRESSION: 0 - SMILING OR INEXPRESSIVE.

## 2023-10-15 NOTE — HOME HEALTH
SKILLED NURSING VISIT FOR ADMISSION [facility] ASSESSMENT TEACHING OF MEDICATIONS DISEASE PROCESS. PT REPORTS HE HAD UNPLANNED BACK SURGERY WHILE OUT OF STATE. BACK STAPLES INTACT WITHOUT REDNESS EDEMA OR DRAINAGE. RENETTA. PT TO HAVE STAPLES REMOVED AT [facility] ON FRIDAY. SN INSTRUCTED PT PT IN BOWEL AND PAIN MANAGEMENT INCLUDING USE OF ICE. PT REQUESTING ONE ADDITIONAL SN AND VISIT FOR NEXT WEEK AND ACCEPTS PHYSICAL THERAPY SERVICES.

## 2023-10-16 PROCEDURE — 1090000002 HH PPS REVENUE DEBIT

## 2023-10-16 PROCEDURE — 1090000001 HH PPS REVENUE CREDIT

## 2023-10-17 ENCOUNTER — HOME CARE VISIT (OUTPATIENT)
Dept: HOME HEALTH SERVICES | Facility: HOME HEALTH | Age: 73
End: 2023-10-17
Payer: MEDICARE

## 2023-10-17 VITALS
RESPIRATION RATE: 20 BRPM | SYSTOLIC BLOOD PRESSURE: 128 MMHG | TEMPERATURE: 97 F | HEART RATE: 72 BPM | OXYGEN SATURATION: 98 % | DIASTOLIC BLOOD PRESSURE: 78 MMHG

## 2023-10-17 VITALS — TEMPERATURE: 98 F

## 2023-10-17 PROCEDURE — G0299 HHS/HOSPICE OF RN EA 15 MIN: HCPCS | Mod: HHH

## 2023-10-17 PROCEDURE — 1090000002 HH PPS REVENUE DEBIT

## 2023-10-17 PROCEDURE — 1090000001 HH PPS REVENUE CREDIT

## 2023-10-17 PROCEDURE — G0151 HHCP-SERV OF PT,EA 15 MIN: HCPCS | Mod: HHH

## 2023-10-17 SDOH — HEALTH STABILITY: PHYSICAL HEALTH: PHYSICAL EXERCISE: STANDING

## 2023-10-17 SDOH — HEALTH STABILITY: PHYSICAL HEALTH: EXERCISE ACTIVITIES SETS: 1

## 2023-10-17 SDOH — HEALTH STABILITY: PHYSICAL HEALTH

## 2023-10-17 SDOH — HEALTH STABILITY: PHYSICAL HEALTH: EXERCISE ACTIVITY: HIP FLEXION

## 2023-10-17 SDOH — HEALTH STABILITY: PHYSICAL HEALTH: PHYSICAL EXERCISE: STNADING

## 2023-10-17 SDOH — HEALTH STABILITY: PHYSICAL HEALTH: PHYSICAL EXERCISE: 15

## 2023-10-17 SDOH — HEALTH STABILITY: PHYSICAL HEALTH: EXERCISE ACTIVITY: ANKLE PUMPS

## 2023-10-17 SDOH — HEALTH STABILITY: PHYSICAL HEALTH: EXERCISE TYPE: INSTRUCTED AND DEMONSTRATED SUP INDEP STANDING THER EX PROGRAM AT KITCHEN SINK

## 2023-10-17 SDOH — HEALTH STABILITY: PHYSICAL HEALTH: EXERCISE ACTIVITY: HIP EXTENSION

## 2023-10-17 SDOH — HEALTH STABILITY: PHYSICAL HEALTH: EXERCISE ACTIVITY: HIP ABDUCTION

## 2023-10-17 SDOH — HEALTH STABILITY: PHYSICAL HEALTH: EXERCISE ACTIVITY: MINI-SQUATS

## 2023-10-17 SDOH — HEALTH STABILITY: PHYSICAL HEALTH: EXERCISE ACTIVITY: KNEE FLEXION

## 2023-10-17 ASSESSMENT — ENCOUNTER SYMPTOMS
LOSS OF SENSATION IN FEET: 0
PAIN LOCATION - PAIN QUALITY: ACHING
DEPRESSION: 0
PAIN LOCATION: BACK
DENIES PAIN: 1
PAIN: 1
CHANGE IN APPETITE: UNCHANGED
PAIN SEVERITY GOAL: 0/10
SUBJECTIVE PAIN PROGRESSION: GRADUALLY IMPROVING
PAIN LOCATION - PAIN FREQUENCY: INTERMITTENT
PAIN LOCATION - PAIN SEVERITY: 2/10
PERSON REPORTING PAIN: PATIENT
HIGHEST PAIN SEVERITY IN PAST 24 HOURS: 4/10
PERSON REPORTING PAIN: PATIENT
APPETITE LEVEL: GOOD
LOWEST PAIN SEVERITY IN PAST 24 HOURS: 1/10
OCCASIONAL FEELINGS OF UNSTEADINESS: 0

## 2023-10-17 ASSESSMENT — ACTIVITIES OF DAILY LIVING (ADL): AMBULATION ASSISTANCE ON FLAT SURFACES: 1

## 2023-10-17 ASSESSMENT — PAIN SCALES - PAIN ASSESSMENT IN ADVANCED DEMENTIA (PAINAD)
CONSOLABILITY: 0
FACIALEXPRESSION: 0
BODYLANGUAGE: 0 - RELAXED.
NEGVOCALIZATION: 2 - REPEATED TROUBLE CALLING OUT. LOUD MOANING OR GROANING. CRYING.
BODYLANGUAGE: 0
NEGVOCALIZATION: 2
BREATHING: 0
FACIALEXPRESSION: 0 - SMILING OR INEXPRESSIVE.
TOTALSCORE: 2
CONSOLABILITY: 0 - NO NEED TO CONSOLE.

## 2023-10-17 NOTE — HOME HEALTH
SKILLED NURSING VISIT FOR ASSESSMENT AND DISCIPLINE DISCHARGE. PT HAD STAPLES REMOVED AT [facility]. LAST FRIDAY. BACK INCISION WELL APPROXIMATED WITHOUT REDNESS EDEMA OR DRAINAGE. PT AMBULATING WITH CANE. PHYSICAL THERAPY REMAINS ACTIVE. MEDICATIONS RECONCILED. DISCIPLINE DISCHARGE PERFORMED.

## 2023-10-18 PROCEDURE — 1090000001 HH PPS REVENUE CREDIT

## 2023-10-18 PROCEDURE — 1090000002 HH PPS REVENUE DEBIT

## 2023-10-19 ENCOUNTER — TRANSCRIBE ORDERS (OUTPATIENT)
Dept: PHYSICAL THERAPY | Facility: CLINIC | Age: 73
End: 2023-10-19

## 2023-10-19 ENCOUNTER — TELEPHONE (OUTPATIENT)
Dept: NEUROSURGERY | Facility: CLINIC | Age: 73
End: 2023-10-19

## 2023-10-19 ENCOUNTER — HOME CARE VISIT (OUTPATIENT)
Dept: HOME HEALTH SERVICES | Facility: HOME HEALTH | Age: 73
End: 2023-10-19
Payer: MEDICARE

## 2023-10-19 VITALS — TEMPERATURE: 97.7 F

## 2023-10-19 DIAGNOSIS — M48.061 DEGENERATIVE LUMBAR SPINAL STENOSIS: Primary | ICD-10-CM

## 2023-10-19 PROCEDURE — 1090000001 HH PPS REVENUE CREDIT

## 2023-10-19 PROCEDURE — 1090000002 HH PPS REVENUE DEBIT

## 2023-10-19 PROCEDURE — G0151 HHCP-SERV OF PT,EA 15 MIN: HCPCS | Mod: HHH

## 2023-10-19 SDOH — HEALTH STABILITY: PHYSICAL HEALTH: EXERCISE ACTIVITY: MINI-SQUATS

## 2023-10-19 SDOH — HEALTH STABILITY: PHYSICAL HEALTH: EXERCISE ACTIVITY: KNEE FLEXION

## 2023-10-19 SDOH — HEALTH STABILITY: PHYSICAL HEALTH: EXERCISE ACTIVITY: HIP EXTENSION

## 2023-10-19 SDOH — HEALTH STABILITY: PHYSICAL HEALTH

## 2023-10-19 SDOH — HEALTH STABILITY: PHYSICAL HEALTH: PHYSICAL EXERCISE: 15

## 2023-10-19 SDOH — HEALTH STABILITY: PHYSICAL HEALTH: EXERCISE ACTIVITIES SETS: 1

## 2023-10-19 SDOH — HEALTH STABILITY: PHYSICAL HEALTH: EXERCISE ACTIVITY: HIP FLEXION

## 2023-10-19 SDOH — HEALTH STABILITY: PHYSICAL HEALTH: PHYSICAL EXERCISE: STANDING

## 2023-10-19 SDOH — HEALTH STABILITY: PHYSICAL HEALTH: EXERCISE TYPE: INSTRUCTED AND DEMONSTRATED INDEP STANDING THER EX PROGRAM

## 2023-10-19 SDOH — HEALTH STABILITY: PHYSICAL HEALTH: EXERCISE ACTIVITY: HIP ABDUCTION

## 2023-10-19 SDOH — HEALTH STABILITY: PHYSICAL HEALTH: EXERCISE ACTIVITY: HEEL RAISES

## 2023-10-19 ASSESSMENT — PAIN SCALES - PAIN ASSESSMENT IN ADVANCED DEMENTIA (PAINAD)
TOTALSCORE: 0
BODYLANGUAGE: 0 - RELAXED.
BREATHING: 0
FACIALEXPRESSION: 0
CONSOLABILITY: 0
BODYLANGUAGE: 0
NEGVOCALIZATION: 0 - NONE.
NEGVOCALIZATION: 0
CONSOLABILITY: 0 - NO NEED TO CONSOLE.
FACIALEXPRESSION: 0 - SMILING OR INEXPRESSIVE.

## 2023-10-19 ASSESSMENT — ENCOUNTER SYMPTOMS
PAIN LOCATION - PAIN SEVERITY: 2/10
PAIN: 1
PERSON REPORTING PAIN: PATIENT
SUBJECTIVE PAIN PROGRESSION: GRADUALLY IMPROVING
LOWEST PAIN SEVERITY IN PAST 24 HOURS: 0/10
HIGHEST PAIN SEVERITY IN PAST 24 HOURS: 4/10
PAIN SEVERITY GOAL: 0/10
PAIN LOCATION - PAIN FREQUENCY: INTERMITTENT
PAIN LOCATION: BACK
OCCASIONAL FEELINGS OF UNSTEADINESS: 0
PAIN LOCATION - PAIN QUALITY: DULL ACHE

## 2023-10-19 ASSESSMENT — ACTIVITIES OF DAILY LIVING (ADL)
AMBULATION ASSISTANCE: INDEPENDENT
AMBULATION ASSISTANCE ON FLAT SURFACES: 1
AMBULATION ASSISTANCE: 1
OASIS_M1830: 01
HOME_HEALTH_OASIS: 00
AMBULATION ASSISTANCE ON UNEVEN SURFACES: 1

## 2023-10-20 DIAGNOSIS — Z98.890 STATUS POST LUMBAR SPINE SURGERY FOR DECOMPRESSION OF SPINAL CORD: Primary | ICD-10-CM

## 2023-10-23 ENCOUNTER — EVALUATION (OUTPATIENT)
Dept: PHYSICAL THERAPY | Facility: CLINIC | Age: 73
End: 2023-10-23
Payer: MEDICARE

## 2023-10-23 DIAGNOSIS — M48.061 DEGENERATIVE LUMBAR SPINAL STENOSIS: ICD-10-CM

## 2023-10-23 PROCEDURE — 97110 THERAPEUTIC EXERCISES: CPT | Mod: GP | Performed by: PHYSICAL THERAPIST

## 2023-10-23 PROCEDURE — 97162 PT EVAL MOD COMPLEX 30 MIN: CPT | Mod: GP | Performed by: PHYSICAL THERAPIST

## 2023-10-23 ASSESSMENT — PATIENT HEALTH QUESTIONNAIRE - PHQ9
SUM OF ALL RESPONSES TO PHQ9 QUESTIONS 1 AND 2: 0
2. FEELING DOWN, DEPRESSED OR HOPELESS: NOT AT ALL
1. LITTLE INTEREST OR PLEASURE IN DOING THINGS: NOT AT ALL

## 2023-10-23 ASSESSMENT — ENCOUNTER SYMPTOMS
OCCASIONAL FEELINGS OF UNSTEADINESS: 0
LOSS OF SENSATION IN FEET: 1
DEPRESSION: 0

## 2023-10-23 ASSESSMENT — PAIN SCALES - GENERAL: PAINLEVEL_OUTOF10: 0 - NO PAIN

## 2023-10-23 NOTE — PROGRESS NOTES
"Physical Therapy    Physical Therapy Evaluation and Treatment      Patient Name: Nabor Baldwin  MRN: [id number]  Today's Date: 10/23/2023    Insurance: [facility]  Allowed visits: BOA  Initial visit approved between 10/23 and 11/6/23  $35 copay    Subjective  Patient s/p PLIF L2-L5 decompression (discectomy/laminectomy) 9/21/23. He had low back pain related to spinal stenosis that he dealt with \"for about 10-15 years\". He experienced saddle anesthesia in July and surgery was recommended at that time. His insurance denied coverage unless he underwent 6 weeks of physical therapy which he did participate in with some relief but continued to have intense pain. He then experienced severe numbness in bilateral LE while he was in [place] stating \"I couldn't move my legs and they collapsed on me\". He ended up having emergency surgery secondary to cauda equina syndrome. His pain has been relieved significantly. He was able to fly home a week after surgery and states \"I went right to a rehab facility\" for 6 days. He then had home PT for 1 week. He feels as though his balance has improved. He wears an AFO on his right LE secondary foot drop. He has been able to drive. His symptoms primarily impacted his right LE stating \"I had a little bit of drop foot before the surgery\". Chief complaint currently is \"just getting my muscles toned\". He was walking with significant trunk flexion prior to surgery due to pain; he did not use an assistive device. Biggest limitation is \"going up and down steps\" which is challenging to perform reciprocally. Exacerbating factors include \"nothing, I literally have no back pain\". He is sleeping well overall. He takes gabapentin as well as Tylenol prn. PMH is positive for neuropathy, right TKA, left knee OA, NID-DM, and prostatectomy.  Referring physician: Benjamin Diane PA-C - F/U not scheduled.   PCP: Rodney Maynard DO    Living environment: multi-story home; upstairs bathroom. Lives with spouse. 3 " "steps to get into home.   Work: [employer]; travels regularly to [place]. Requires flying and driving. He plans to make his bags less heavy and use a backpack as opposed to a rolling bag.     Patient-specific goal: get back to work ASAP. He would like to not walk with a cane.     Objective  Worst pain in the last 24 hours, 0/10.     Precautions: no bending and twisting for another week; no lifting > 15 lbs.     Observation: decreased lumbar lordosis and some PPT. Palpable, baseball-size lump on left upper back that patient calls a \"lipoma\".     Gait Assessment: ambulated into clinic with SPC with AFO on right LE. Foot slap evident bilaterally with early TKE presumably for stability and excessive hip and knee flexion to compensate for decreased foot clearance.     AROM  Lumbar flexion: decreased 50%   Lumbar extension: decreased 75% \"tightness\"   Lumbar sidebending right: decreased 50%    Sidebending left: decreased 50%   Lumbar rotation right: WNL    Rotation left: decreased 25%   Hip flexion right: WNL    Hip flexion left: slightly limited  Hip extension right: slightly limited with compensatory ER    Hip extension left: slightly limited  Hip ER supine right: WNL   Hip ER supine left: WNL  Hip IR supine right: slightly limited   Hip IR supine left: limited    MMT:  (L3-L4) Right quadriceps: 5    Left quadriceps: 5  (L5) Right DF: 3-    Left DF: 4   (L1-L2) Right iliopsoas: 4-    Left iliopsoas: 4  (S2) Right hip abductors supine: good   Left hip abductors supine: good  (L1-L2) Right hip adductors supine: good    Left hip adductors supine: good  (L5-S1) Right gluteus razia: 4-   Left gluteus razia: 4  (S1-S2) Right hamstrings: 4   Left hamstrings: 4+  Lower abdominals: 4/10    Upper abdominals: 6/10    Flexibility:  Right iliopsoas: slightly limited    Left iliopsoas: slightly limited  Right hamstring: slightly limited    Left hamstring: slightly limited  Right piriformis: slightly " "limited   Left piriformis: limited  Right quadriceps: slightly limited   Left quadriceps: limited    SUMMER: 16%    Assessment  74 yo male with 10-15 year history of present condition and presence of 7 personal factors and/or comorbidities that impact the plan of care including age of 73, chronicity of symptoms, and PMH of neuropathy, right TKA, left knee OA, NID-DM, and prostatectomy presents post-operatively with decreased AROM, decreased strength, decreased flexibility, and difficulty with ambulation effecting the following body structures and functions: low back body region and musculoskeletal and neuromuscular body system including the lumbar spine. Activity limitations and participation restrictions include decreased tolerance to ambulation and stair negotiation. Nabor will therefore benefit from PT management to establish a HEP and promote strength progression. The clinical presentation of this patient is evolving and their history and examination findings are consistent with a moderate complexity evaluation. Good potential.    Treatment provided today: Initial evaluation completed. Discussed objective findings and goals of skilled care. Instructed patient in therapeutic exercise and HEP with handout outlining specific parameters provided (OSWALDO 2', bridges 3\" 2x10, ball squeeze 10\"x10, TAC 5\"x10, seated TR 2x10 - [id number]). Agreed upon POC and answered all questions.    57957 - 22 minutes, 1 unit untimed  64846 - 23 minutes, 2 units    Plan  Recommending PT management 2x/week for 4 weeks, 8 visits    Goals  Independent with HEP to expedite progress and promote goal achievement.   Decrease SUMMER score to < or equal to 6% for evidence of improved function.  Increase AROM lumbar spine flexion, extension, and bilateral SB by > or equal to 25% for improved mobility and ease movement.   Increase strength bilateral iliopsoas, gluteals, DF, and right HS to > or equal to 4+/5 for improved gait mechanics.   Patient " ambulate > or equal to 50 feet with LRAD with even stance phase and improved heel strike on right for improved balance with gait.

## 2023-10-23 NOTE — Clinical Note
October 24, 2023     Patient: Nabor Baldwin   YOB: 1950   Date of Visit: 10/23/2023       To Whom It May Concern:    It is my medical opinion that Nabor Baldwin {Work release (duty restriction):96949}.    If you have any questions or concerns, please don't hesitate to call.         Sincerely,        Sana Pablo, REX    CC: No Recipients

## 2023-10-23 NOTE — Clinical Note
October 24, 2023     Patient: Nabor Baldwin   YOB: 1950   Date of Visit: 10/23/2023       To Whom it May Concern:    Nabor Baldwin was seen in my clinic on 10/23/2023. He {Return to school/sport:58427}.    If you have any questions or concerns, please don't hesitate to call.         Sincerely,          Sana Pablo DPT        CC: No Recipients

## 2023-10-31 ENCOUNTER — TREATMENT (OUTPATIENT)
Dept: PHYSICAL THERAPY | Facility: CLINIC | Age: 73
End: 2023-10-31
Payer: MEDICARE

## 2023-10-31 ENCOUNTER — DOCUMENTATION (OUTPATIENT)
Dept: PHYSICAL THERAPY | Facility: CLINIC | Age: 73
End: 2023-10-31

## 2023-10-31 DIAGNOSIS — M48.061 DEGENERATIVE LUMBAR SPINAL STENOSIS: Primary | ICD-10-CM

## 2023-10-31 DIAGNOSIS — M54.16 LUMBAR BACK PAIN WITH RADICULOPATHY AFFECTING RIGHT LOWER EXTREMITY: ICD-10-CM

## 2023-10-31 PROCEDURE — 97110 THERAPEUTIC EXERCISES: CPT | Mod: GP | Performed by: PHYSICAL THERAPIST

## 2023-10-31 ASSESSMENT — PAIN SCALES - GENERAL: PAINLEVEL_OUTOF10: 0 - NO PAIN
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
## 2023-10-31 NOTE — PROGRESS NOTES
Physical Therapy                 Therapy Communication Note    Patient Name: Nabor Baldwin  MRN: [id number]  Today's Date: 10/31/2023     Discipline: Physical Therapy    Missed Visit Reason: Patient did not show for scheduled F/U visit for this AM.     Missed Time: No Show

## 2023-11-01 PROCEDURE — G0180 MD CERTIFICATION HHA PATIENT: HCPCS | Performed by: FAMILY MEDICINE

## 2023-11-02 ENCOUNTER — APPOINTMENT (OUTPATIENT)
Dept: PHYSICAL THERAPY | Facility: CLINIC | Age: 73
End: 2023-11-02
Payer: MEDICARE

## 2023-11-07 ENCOUNTER — TREATMENT (OUTPATIENT)
Dept: PHYSICAL THERAPY | Facility: CLINIC | Age: 73
End: 2023-11-07
Payer: MEDICARE

## 2023-11-07 DIAGNOSIS — M54.16 LUMBAR BACK PAIN WITH RADICULOPATHY AFFECTING RIGHT LOWER EXTREMITY: Primary | ICD-10-CM

## 2023-11-07 DIAGNOSIS — M48.061 DEGENERATIVE LUMBAR SPINAL STENOSIS: ICD-10-CM

## 2023-11-07 PROCEDURE — 97140 MANUAL THERAPY 1/> REGIONS: CPT | Mod: GP | Performed by: PHYSICAL THERAPIST

## 2023-11-07 PROCEDURE — 97110 THERAPEUTIC EXERCISES: CPT | Mod: GP | Performed by: PHYSICAL THERAPIST

## 2023-11-07 PROCEDURE — 97112 NEUROMUSCULAR REEDUCATION: CPT | Mod: GP | Performed by: PHYSICAL THERAPIST

## 2023-11-07 NOTE — PROGRESS NOTES
"Physical Therapy    Physical Therapy Treatment    Patient Name: Nabor Baldwin  MRN: [id number]  Today's Date: 11/7/2023    Insurance: [employer] [employer]  Allowed visits: 11 (10/24/23 - 1/21/24)    Subjective  Patient with no new complaints today. He reports good tolerance to last workout. He has been compliant with his HEP. He inquires about electrical stimulation for his drop foot.     Objective  Reviewed and progressed marked therapeutic exercise (40331 -  37 minutes, 2 units) per patient tolerance: TM walking 1.3 mph x 5'   Standing calf stretch on right 30\"x2  OSWALDO 2'  *Alternating LE extension in quadruped x10 ea  *Cat/camel 2x10     *added to HEP    NMR (79454 - 8 minutes, 1 unit) NMES for muscle-re-education to tibialis anterior muscle with seated TR to promote DF and decreased foot drop; 5', 10/20 on/off time, 50.0 mA.   Tandem balance 1' trial bilaterally    Assessment  Patient with some improvement in ankle DF. He does demonstrate some active toe extension but may benefit from ES to EHL in addition to TA to promote adequate DF. Encouraged continued compliance with HEP.     Plan  Continue per POC at this time.       "

## 2023-11-09 ENCOUNTER — APPOINTMENT (OUTPATIENT)
Dept: PHYSICAL THERAPY | Facility: CLINIC | Age: 73
End: 2023-11-09
Payer: MEDICARE

## 2023-11-14 ENCOUNTER — TREATMENT (OUTPATIENT)
Dept: PHYSICAL THERAPY | Facility: CLINIC | Age: 73
End: 2023-11-14
Payer: MEDICARE

## 2023-11-14 DIAGNOSIS — M48.061 DEGENERATIVE LUMBAR SPINAL STENOSIS: ICD-10-CM

## 2023-11-14 DIAGNOSIS — M54.16 LUMBAR BACK PAIN WITH RADICULOPATHY AFFECTING RIGHT LOWER EXTREMITY: Primary | ICD-10-CM

## 2023-11-14 PROCEDURE — 97110 THERAPEUTIC EXERCISES: CPT | Mod: GP | Performed by: PHYSICAL THERAPIST

## 2023-11-14 PROCEDURE — 97112 NEUROMUSCULAR REEDUCATION: CPT | Mod: GP | Performed by: PHYSICAL THERAPIST

## 2023-11-14 PROCEDURE — 97140 MANUAL THERAPY 1/> REGIONS: CPT | Mod: GP | Performed by: PHYSICAL THERAPIST

## 2023-11-14 ASSESSMENT — PAIN SCALES - GENERAL: PAINLEVEL_OUTOF10: 1

## 2023-11-14 NOTE — PROGRESS NOTES
"Physical Therapy    Physical Therapy Treatment    Patient Name: Nabor Baldwin  MRN: [id number]  Today's Date: 11/14/2023    Insurance: [employer] [employer]  Allowed visits: 11 (10/24/23 - 1/21/24)    Subjective  Patient with some improvement in symptoms overall. He feels as though NMES was helpful for his foot drop. He has been compliant with his HEP. He notes some stiffness this AM but not much in the way of pain.    Objective  Reviewed and progressed marked therapeutic exercise (77207 -  37 minutes, 2 units) per patient tolerance: TM walking 1.5 mph x 5'   Standing calf stretch on right 30\"x2  OSWALDO 2'  Alternating LE extension in quadruped x10 ea  Cat/camel 2x10  Bridges 5\" 2x10  *Mini squats 2x10     *added to HEP     NMR (91548 - 8 minutes, 1 unit) NMES for muscle-re-education to tibialis anterior and ED muscle with seated TR to promote DF and decreased foot drop; 5', 10/20 on/off time, 37.0 - 40 mA.      Assessment  Patient able to up his speed on TM today with some fatigue evident. Continues to be challenged with quadruped stability likely due to paraspinal and multifidus weakness. Encouraged continued compliance with HEP.     Plan  Continue per POC at this time.       "

## 2023-11-15 ENCOUNTER — OFFICE VISIT (OUTPATIENT)
Dept: PRIMARY CARE | Facility: CLINIC | Age: 73
End: 2023-11-15
Payer: MEDICARE

## 2023-11-15 VITALS
OXYGEN SATURATION: 95 % | SYSTOLIC BLOOD PRESSURE: 126 MMHG | DIASTOLIC BLOOD PRESSURE: 78 MMHG | HEART RATE: 88 BPM | TEMPERATURE: 98.5 F | BODY MASS INDEX: 30.72 KG/M2 | HEIGHT: 68 IN | WEIGHT: 202.7 LBS

## 2023-11-15 DIAGNOSIS — N40.1 BENIGN LOCALIZED HYPERPLASIA OF PROSTATE WITH URINARY OBSTRUCTION: ICD-10-CM

## 2023-11-15 DIAGNOSIS — E11.42 TYPE 2 DIABETES MELLITUS WITH DIABETIC POLYNEUROPATHY, WITHOUT LONG-TERM CURRENT USE OF INSULIN (MULTI): ICD-10-CM

## 2023-11-15 DIAGNOSIS — M48.061 DEGENERATIVE LUMBAR SPINAL STENOSIS: ICD-10-CM

## 2023-11-15 DIAGNOSIS — G47.33 OSA ON CPAP: ICD-10-CM

## 2023-11-15 DIAGNOSIS — N13.8 BENIGN LOCALIZED HYPERPLASIA OF PROSTATE WITH URINARY OBSTRUCTION: ICD-10-CM

## 2023-11-15 DIAGNOSIS — E66.9 CLASS 1 OBESITY WITH SERIOUS COMORBIDITY AND BODY MASS INDEX (BMI) OF 30.0 TO 30.9 IN ADULT, UNSPECIFIED OBESITY TYPE: ICD-10-CM

## 2023-11-15 DIAGNOSIS — R00.2 PALPITATIONS: ICD-10-CM

## 2023-11-15 DIAGNOSIS — E78.2 HYPERLIPIDEMIA, MIXED: ICD-10-CM

## 2023-11-15 DIAGNOSIS — I10 PRIMARY HYPERTENSION: Primary | ICD-10-CM

## 2023-11-15 DIAGNOSIS — Z23 ENCOUNTER FOR IMMUNIZATION: ICD-10-CM

## 2023-11-15 PROCEDURE — 1159F MED LIST DOCD IN RCRD: CPT | Performed by: FAMILY MEDICINE

## 2023-11-15 PROCEDURE — 3074F SYST BP LT 130 MM HG: CPT | Performed by: FAMILY MEDICINE

## 2023-11-15 PROCEDURE — 3008F BODY MASS INDEX DOCD: CPT | Performed by: FAMILY MEDICINE

## 2023-11-15 PROCEDURE — 1160F RVW MEDS BY RX/DR IN RCRD: CPT | Performed by: FAMILY MEDICINE

## 2023-11-15 PROCEDURE — 1036F TOBACCO NON-USER: CPT | Performed by: FAMILY MEDICINE

## 2023-11-15 PROCEDURE — 3044F HG A1C LEVEL LT 7.0%: CPT | Performed by: FAMILY MEDICINE

## 2023-11-15 PROCEDURE — 99214 OFFICE O/P EST MOD 30 MIN: CPT | Performed by: FAMILY MEDICINE

## 2023-11-15 PROCEDURE — 1125F AMNT PAIN NOTED PAIN PRSNT: CPT | Performed by: FAMILY MEDICINE

## 2023-11-15 PROCEDURE — 90662 IIV NO PRSV INCREASED AG IM: CPT | Performed by: FAMILY MEDICINE

## 2023-11-15 PROCEDURE — 3078F DIAST BP <80 MM HG: CPT | Performed by: FAMILY MEDICINE

## 2023-11-15 PROCEDURE — 4010F ACE/ARB THERAPY RXD/TAKEN: CPT | Performed by: FAMILY MEDICINE

## 2023-11-15 PROCEDURE — G0008 ADMIN INFLUENZA VIRUS VAC: HCPCS | Performed by: FAMILY MEDICINE

## 2023-11-15 NOTE — PROGRESS NOTES
"Subjective   Patient ID: Nabor Baldwin is a 73 y.o. male who presents for Follow-up.    HPI     11/15/2023:  Pt reports that he had emergency surgery for lumbar stenosis after he lost control of his legs while in [place].  His care was then transferred to Dr. Van Badillo here locally.  Has some residual right foot drop but overall states that he feels \"great.\"  Currently using a cane and an AFO on his right ankle due to the foot drop.    [facility]    He has hypertension.  Patient does monitor his BP at home occasionally.  Denies SOB, CP, and dizziness.  Patient is compliant with his antihypertensive therapy and denies any noted side effects.     He has hyperlipidemia.  Patient tries to limit the amount of fatty foods and high cholesterol foods that he consumes.  Patient is compliant with his statin therapy and denies any currently noted side effects.     He has DM Type 2 with diabetic neuropathy.  The patient does try to limit the amount of carbohydrates that he consumes in his diet.  He does monitor sugars at home occasionally but not on an everyday basis.  Pt reports neuropathy in feet bilateral.     Patient has degenerative lumbar spinal stenosis.   Pt continues to utilize Celebrex with good results.  Underwent emergency surgery in [place] after losing control of his legs.  Pt underwent L2-5 decompression (discectomy/laminectomy) at OSH in [place], [place] on 9/21/23      He has obstructive sleep apnea.  Patient is compliant with the use of his CPAP on a nightly basis.  Patient continues to benefit from CPAP therapy.  He does awaken feeling refreshed.     Patient has BPH with a history of urinary retention.  He underwent HOLMIUM LASER ENUCLEATION OF THE PROSTATE 2/4/22 by Dr. Lou.  He has had significant reduction in obstructive symptoms.     Patient previously experienced palpitations.  Cardiology diagnosed him with PVCs and started him on propranolol at bedtime.  This continues to work " "well with suppressing the PVCs/palpitations.            Review of Systems  Constitutional: Patient denies any fever, chills, loss of appetite, or unexplained weight loss.  Cardiovascular: Patient denies any chest pain, shortness of breath with exertion, tachycardia, palpitations, orthopnea, or paroxysmal nocturnal dyspnea.  Respiratory: Patient denies any cough, shortness breath, or wheezing.  Gastrointestinal: Patient denies any nausea, vomiting, diarrhea, constipation, melena, hematochezia, or reflux symptoms.  Skin: Denies any rashes or skin lesions.     Neurology: Patient denies any new motor or sensory losses.    Has experienced some right foot drop following his emergency lumbar surgery 9/2023.    Endocrinology: Denies any polyuria, polydipsia, polyphagia, or heat/cold intolerance.    Objective   /78   Pulse 88   Temp 36.9 °C (98.5 °F)   Ht 1.727 m (5' 8\")   Wt 91.9 kg (202 lb 11.2 oz)   SpO2 95%   BMI 30.82 kg/m²     Physical Exam  General Appearance: Alert and cooperative, in no acute distress, well-developed/well-nourished.  Neck: Supple and without adenopathy or rigidity.  There is no JVD at 90° and no carotid bruits are noted.  There is no thyromegaly, thyroid tenderness, or palpable thyroid nodules.  Heart: Regular rate and rhythm without murmur or ectopy.  Respiratory: Lungs are clear to auscultation bilaterally with good air exchange.  Good respiratory effort and no accessory muscle use.  Skin: Good turgor, moist, warm and without rashes or lesions.    Neurological exam: Alert and oriented ×3, no tremor.  Ambulates with a cane.     Extremities: No clubbing, cyanosis, or edema  AFO applied to right foot.      Assessment/Plan   HTN: Stable in office readings.  Blood pressure appears adequately controlled and we will continue with the current antihypertensive therapy.     Hyperlipidemia: Stable based on recent labs.  Tolerating current medications. We will continue current " medications.  Dietary changes, exercise, and maintenance of healthy weight were discussed.     Diabetes mellitus type 2 with neuropathy: Most recent labs reveal an A1c of 6.4%.  Dietary changes, exercise, and maintenance of a healthy weight were discussed.      Obstructive sleep apnea: Stable based on symptoms.  He will continue nightly use of his CPAP machine.  He is compliant with nightly use of CPAP use and has been on CPAP for years now.     Degenerative spinal stenosis at L4/L5: Stable based on symptoms.  We will continue the Celebrex as needed.  Underwent emergency L2-5 decompression (discectomy/laminectomy) at [place] in [place], [place] on 9/21/23     BPH / Hx of Urinary retention: Currently on the finasteride and tamsulosin.  He will continue to follow up with urology as directed.   HE UNDERWENT LASER ENUCLEATION OF THE PROSTATE BY DR. VILLELA IN THE PAST.  Continues to do well.     Palpitations / PVCs: Patient is currently on propranolol at at bedtime and this continues to work well for him.     Obesity: Dietary changes, exercise, and maintenance of a healthy weight were discussed at length.  Goal is to achieve a BMI less than 25.          COLOGUARD DUE 7/22/2023  PSA DONE 5/15/2023 - managed by Urologist (Dr. Villela).        Orders Placed This Encounter   Procedures    Flu vaccine, quadrivalent, high-dose, preservative free, age 65y+ (FLUZONE)    Hemoglobin A1C    Comprehensive Metabolic Panel     We will continue the current medications without change.

## 2023-11-15 NOTE — PATIENT INSTRUCTIONS
Follow up in 3 months with labs to be done PRIOR.    It was a pleasure to see you today. Thank you for choosing us for your health care needs.    If you have lab or other testing completed and have not been informed of results within one week, please call the office for your results.    If you haven't done so, consider signing up for [facility], the [facility] personal health record. Ask the staff how you can get started.

## 2023-11-15 NOTE — PROGRESS NOTES
"Physical Therapy Treatment    Patient Name: Nabor Baldwin  MRN: [id number]  Today's Date: 11/16/2023  Time Calculation  Start Time: 0833  Stop Time: 0916  Time Calculation (min): 43 min    Current Problem  1. Lumbar back pain with radiculopathy affecting right lower extremity        2. Degenerative lumbar spinal stenosis            Insurance:  Insurance: [employer] [employer]  Allowed visits: 11 (10/24/23 - 1/21/24)  Visit# 5  Precautions       Subjective   Subjective:   Pt reports that his feet pretty good today, not so heavy. No back pain  Pain   0/10    Objective   Treatments:  TM walking 1.5 mph x 5'   Standing calf stretch on right 30\"x2   OSWALDO 2'   Alternating LE extension in quadruped x12 ea  Cat/camel 2x10  Bridges 5\" 2x10  *Mini squats 2x10   S/l hip abd B 10x ea     *added to HEP     NMR (54455 - 8 minutes, 1 unit) NMES for muscle-re-education to tibialis anterior and ED muscle with seated TR to promote DF and decreased foot drop; 5', 10/20 on/off time, 37.0 - 40 mA.       OP EDUCATION:     Correct pelvic alignment for ex    Assessment:   Pt had some fwd flexed posture while walking on TM. Pt able to press up from prone on elbows, though hips came off table, due to lack of mobility. Pt had some hip dropping during quadruped alt LE. Tried s/l hip abd, which pt had difficulty keeping the leg in correct alignment to the body, with the R being much more challenging.    Plan:   Improve R ankle DF and B LE strength. See if insurance covers NMES rental              "

## 2023-11-16 ENCOUNTER — TREATMENT (OUTPATIENT)
Dept: PHYSICAL THERAPY | Facility: CLINIC | Age: 73
End: 2023-11-16
Payer: MEDICARE

## 2023-11-16 ENCOUNTER — APPOINTMENT (OUTPATIENT)
Dept: PRIMARY CARE | Facility: CLINIC | Age: 73
End: 2023-11-16
Payer: MEDICARE

## 2023-11-16 DIAGNOSIS — M54.16 LUMBAR BACK PAIN WITH RADICULOPATHY AFFECTING RIGHT LOWER EXTREMITY: Primary | ICD-10-CM

## 2023-11-16 DIAGNOSIS — M48.061 DEGENERATIVE LUMBAR SPINAL STENOSIS: ICD-10-CM

## 2023-11-16 PROCEDURE — 97112 NEUROMUSCULAR REEDUCATION: CPT | Mod: GP,CQ

## 2023-11-16 PROCEDURE — 97110 THERAPEUTIC EXERCISES: CPT | Mod: GP,CQ

## 2023-11-20 NOTE — PROGRESS NOTES
"Physical Therapy    Physical Therapy Treatment    Patient Name: Nabor Baldwin  MRN: [id number]  Today's Date: 11/21/2023    Insurance: [employer]  Allowed visits: 11 (10/24/23 - 1/21/24)    Subjective  Patient with no new complaints today. He reports good tolerance to last workout. He continues to feel as though he is getting benefit from use of NMES though does continue to wear his AFO. He was able to get on/off floor independently over the weekend while putting his Rc tree up though did need to use a chair and his wife was present.     Objective  Reviewed and progressed marked therapeutic exercise (24049 -  35 minutes, 2 units) per patient tolerance: TM walking 1.7 mph x 5' (cues for erect posture)  Standing calf stretch on right 30\"x2  OSWALDO 2'  Prone press ups x10  Bird dog x10 ea  Cat/camel 2x10  Bridges 5\" 2x10  Step up and over in // bars 6\" x10 ea     NMR (27167 - 10 minutes, 1 unit) NMES for muscle-re-education to tibialis anterior and ED muscle with seated TR and arch domes to promote DF and decreased foot drop; 8', 10/10 on/off time, 37.0 - 40 mA.     Assessment  Patient with some improvement in eversion of right foot/ankle but minimally with DF though patient continues to feel as though he is gaining mobility. He demonstrates good carryover with program thus far. Challenged with step up and over task.     Plan  Continue per POC at this time. Patient to schedule 3 remaining approved visits.        "

## 2023-11-21 ENCOUNTER — TREATMENT (OUTPATIENT)
Dept: PHYSICAL THERAPY | Facility: CLINIC | Age: 73
End: 2023-11-21
Payer: MEDICARE

## 2023-11-21 DIAGNOSIS — M48.061 DEGENERATIVE LUMBAR SPINAL STENOSIS: ICD-10-CM

## 2023-11-21 DIAGNOSIS — M54.16 LUMBAR BACK PAIN WITH RADICULOPATHY AFFECTING RIGHT LOWER EXTREMITY: Primary | ICD-10-CM

## 2023-11-21 PROCEDURE — 97110 THERAPEUTIC EXERCISES: CPT | Mod: GP | Performed by: PHYSICAL THERAPIST

## 2023-11-21 PROCEDURE — 97112 NEUROMUSCULAR REEDUCATION: CPT | Mod: GP | Performed by: PHYSICAL THERAPIST

## 2023-11-21 ASSESSMENT — PAIN SCALES - GENERAL: PAINLEVEL_OUTOF10: 0 - NO PAIN

## 2023-11-27 NOTE — PROGRESS NOTES
"Physical Therapy Treatment    Patient Name: Nabor Baldwin  MRN: [id number]  Today's Date: 11/28/2023  Time Calculation  Start Time: 0501  Stop Time: 0546  Time Calculation (min): 45 min    Current Problem  1. Lumbar back pain with radiculopathy affecting right lower extremity        2. Degenerative lumbar spinal stenosis            Insurance:  Insurance: [employer] [employer]  Allowed visits: 11 (10/24/23 - 1/21/24)  Visit# 7  Precautions   None    Subjective   Subjective:   Pt reports that he is doing pretty good. He doesn't have any pain and has been active around the house and has been doing HEP.   Pain   0/10      Objective   Treatments:  TM walking 1.7 mph x 5' (cues for erect posture)  Standing calf stretch on right 30\"x2  OSWALDO 2'  Prone press ups x10  Bird dog x10 ea  Cat/camel 2x10  Bridges 5\" 2x10  Step up and over in 6\" x 5ea  Tap Downs 6 in R 10x  SLB L 20\"     NMR (67195 - 10 minutes, 1 unit) NMES for muscle-re-education to tibialis anterior and ED muscle with seated TR and arch domes to promote DF and decreased foot drop; 8', 10/10 on/off time, 37.0 - 40 mA.   OP EDUCATION:         Assessment:   Pt still tends to fwd flex during TM walking. Pt displayed lack of lumbar ext during prone press ups, as his hips lifted off of table. Pt had fair stability with quad alt LE/UE, as his core strength improves. Pt challenged with step ups when leading with the L, due to knee feeling unstable. Pt better able to step down, as he tends to lock the knee. . Added L SLB to help with knee stability and R tap downs for L ankle DF and strength. Normal fatigue following therapy session.    Plan:   Cont to work on DF activation and LE strength and posture. Pt awaiting to hear about NMES unit.              "

## 2023-11-28 ENCOUNTER — TREATMENT (OUTPATIENT)
Dept: PHYSICAL THERAPY | Facility: CLINIC | Age: 73
End: 2023-11-28
Payer: MEDICARE

## 2023-11-28 DIAGNOSIS — M54.16 LUMBAR BACK PAIN WITH RADICULOPATHY AFFECTING RIGHT LOWER EXTREMITY: Primary | ICD-10-CM

## 2023-11-28 DIAGNOSIS — M48.061 DEGENERATIVE LUMBAR SPINAL STENOSIS: ICD-10-CM

## 2023-11-28 PROCEDURE — 97110 THERAPEUTIC EXERCISES: CPT | Mod: GP,CQ

## 2023-11-28 PROCEDURE — 97112 NEUROMUSCULAR REEDUCATION: CPT | Mod: GP,CQ

## 2023-11-30 ENCOUNTER — TREATMENT (OUTPATIENT)
Dept: PHYSICAL THERAPY | Facility: CLINIC | Age: 73
End: 2023-11-30
Payer: MEDICARE

## 2023-11-30 DIAGNOSIS — M54.16 LUMBAR BACK PAIN WITH RADICULOPATHY AFFECTING RIGHT LOWER EXTREMITY: ICD-10-CM

## 2023-11-30 DIAGNOSIS — M48.061 DEGENERATIVE LUMBAR SPINAL STENOSIS: Primary | ICD-10-CM

## 2023-11-30 PROCEDURE — 97112 NEUROMUSCULAR REEDUCATION: CPT | Mod: GP,CQ

## 2023-11-30 PROCEDURE — 97110 THERAPEUTIC EXERCISES: CPT | Mod: GP,CQ

## 2023-11-30 ASSESSMENT — PAIN - FUNCTIONAL ASSESSMENT: PAIN_FUNCTIONAL_ASSESSMENT: 0-10

## 2023-11-30 ASSESSMENT — PAIN SCALES - GENERAL: PAINLEVEL_OUTOF10: 0 - NO PAIN

## 2023-11-30 NOTE — PROGRESS NOTES
"Physical Therapy Treatment    Patient Name: Nabor Baldwin  MRN: [id number]  Today's Date: 11/30/2023  Time Calculation  Start Time: 1618  Stop Time: 1700  Time Calculation (min): 42 min    Current Problem  1. Degenerative lumbar spinal stenosis        2. Lumbar back pain with radiculopathy affecting right lower extremity            Subjective   General     Precautions     Pain  Pain Assessment: 0-10  Pain Score: 0 - No pain    Objective   Treatments:   TM walking 1.7 mph x 5' (cues for erect posture)  Standing calf stretch on right 30\"x2  OSWALDO 2'  Prone press ups x10  Bird dog x10 ea  Cat/camel 2x10  Bridges 5\" 2x10  Step up and over in 6\" x 5ea  Tap Downs 6 in R 10x  SLB L 20\"     NMR (46888 - 10 minutes, 1 unit) NMES for muscle-re-education to tibialis anterior and ED muscle with seated TR and arch domes to promote DF and decreased foot drop; 8', 10/10 on/off time, 37.0 - 40 mA.   Assessment:   Pt. Still having some difficulty w/ TR w/ NMES, but pt. Has noticed an improvement since starting. Pt. Progressing w/ all exercises well. Pt. Will continue w/ current HEP.     Plan:   Continue w/ current POC.     OP EDUCATION:       Goals:     "

## 2023-12-04 ENCOUNTER — APPOINTMENT (OUTPATIENT)
Dept: PHYSICAL THERAPY | Facility: CLINIC | Age: 73
End: 2023-12-04
Payer: MEDICARE

## 2023-12-04 DIAGNOSIS — M62.569 ATROPHY OF MUSCLE OF LOWER LEG, UNSPECIFIED LATERALITY: Primary | ICD-10-CM

## 2023-12-07 ENCOUNTER — APPOINTMENT (OUTPATIENT)
Dept: PHYSICAL THERAPY | Facility: CLINIC | Age: 73
End: 2023-12-07
Payer: MEDICARE

## 2023-12-13 ENCOUNTER — TREATMENT (OUTPATIENT)
Dept: PHYSICAL THERAPY | Facility: CLINIC | Age: 73
End: 2023-12-13
Payer: MEDICARE

## 2023-12-13 DIAGNOSIS — M48.061 DEGENERATIVE LUMBAR SPINAL STENOSIS: Primary | ICD-10-CM

## 2023-12-13 DIAGNOSIS — M54.16 LUMBAR BACK PAIN WITH RADICULOPATHY AFFECTING RIGHT LOWER EXTREMITY: ICD-10-CM

## 2023-12-13 PROCEDURE — 97110 THERAPEUTIC EXERCISES: CPT | Mod: GP | Performed by: PHYSICAL THERAPIST

## 2023-12-13 PROCEDURE — 97112 NEUROMUSCULAR REEDUCATION: CPT | Mod: GP | Performed by: PHYSICAL THERAPIST

## 2023-12-13 ASSESSMENT — PAIN SCALES - GENERAL: PAINLEVEL_OUTOF10: 0 - NO PAIN

## 2023-12-28 ENCOUNTER — TREATMENT (OUTPATIENT)
Dept: PHYSICAL THERAPY | Facility: CLINIC | Age: 73
End: 2023-12-28
Payer: MEDICARE

## 2023-12-28 DIAGNOSIS — M48.061 DEGENERATIVE LUMBAR SPINAL STENOSIS: Primary | ICD-10-CM

## 2023-12-28 DIAGNOSIS — M54.16 LUMBAR BACK PAIN WITH RADICULOPATHY AFFECTING RIGHT LOWER EXTREMITY: ICD-10-CM

## 2023-12-28 PROCEDURE — 97110 THERAPEUTIC EXERCISES: CPT | Mod: GP,CQ

## 2023-12-28 PROCEDURE — 97112 NEUROMUSCULAR REEDUCATION: CPT | Mod: GP,CQ

## 2023-12-28 ASSESSMENT — PAIN SCALES - GENERAL: PAINLEVEL_OUTOF10: 0 - NO PAIN

## 2023-12-28 NOTE — PROGRESS NOTES
"Physical Therapy Treatment    Patient Name: Nabor Baldwin  MRN: [id number]  Today's Date: 12/28/2023  Time Calculation  Start Time: 1715  Stop Time: 1753  Time Calculation (min): 38 min    Insurance: [facility]/[employer]  Allowed visits: 11 (10/24/23 - 1/21/24)  Visit: 10/11    Current Problem  1. Degenerative lumbar spinal stenosis        2. Lumbar back pain with radiculopathy affecting right lower extremity            Subjective   General     Precautions     Pain  Pain Score: 0 - No pain    Objective   Treatments:   TM walking 1.7 mph x 5' (cues for erect posture)  Standing calf stretch on right 30\"x2  OSWALDO 2'  Prone press ups x10  Bird dog x10 ea  Cat/camel 2x10  *Piriformis stretch 30\"x3  Bridges 5\" 2x10     *added to HEP     NMR (41302 - 10 minutes, 1 unit) NMES for muscle-re-education to tibialis anterior and ED muscle with seated TR and arch domes to promote DF and decreased foot drop; 8', 10/10 on/off time, 37.0 - 40 mA.     Assessment:   Pt. Progressing w/ all exercises well. Reviewed and educated pt. On home NMES unit w/ good understanding. Pt. Will continue w/ HEP and will be ready for DC NV.     Plan:   Continue w/ current POC.     OP EDUCATION:       Goals:     "

## 2024-01-04 ENCOUNTER — TREATMENT (OUTPATIENT)
Dept: PHYSICAL THERAPY | Facility: CLINIC | Age: 74
End: 2024-01-04
Payer: MEDICARE

## 2024-01-04 DIAGNOSIS — M54.16 LUMBAR BACK PAIN WITH RADICULOPATHY AFFECTING RIGHT LOWER EXTREMITY: ICD-10-CM

## 2024-01-04 DIAGNOSIS — M48.061 DEGENERATIVE LUMBAR SPINAL STENOSIS: Primary | ICD-10-CM

## 2024-01-04 PROCEDURE — 97110 THERAPEUTIC EXERCISES: CPT | Mod: GP,CQ

## 2024-01-04 ASSESSMENT — PAIN SCALES - GENERAL: PAINLEVEL_OUTOF10: 0 - NO PAIN

## 2024-01-04 NOTE — PROGRESS NOTES
"Physical Therapy Treatment    Patient Name: Nabor Baldwin  MRN: [id number]  Today's Date: 1/4/2024  Time Calculation  Start Time: 1705  Stop Time: 1737  Time Calculation (min): 32 min    Insurance: [facility]/[employer]  Allowed visits: 11 (10/24/23 - 1/21/24)  Visit: 11/11    Current Problem  1. Degenerative lumbar spinal stenosis        2. Lumbar back pain with radiculopathy affecting right lower extremity            Subjective   General   Pt. Reports his home NMES is going well.   Precautions     Pain  Pain Score: 0 - No pain    Objective   Treatments:    TM walking 1.7 mph x 5' (cues for erect posture)  Standing calf stretch on right 30\"x2  OSWALDO 2'  Prone press ups x10  Bird dog x10 ea  Cat/camel 2x10  *Piriformis stretch 30\"x3  Bridges 5\" 2x10  Seated TR x20  Stand HR x20  Squats x20  SLS 10\"x10     *added to HEP    Assessment:   Pt. Completed all exercises w/o pain and has progressed well. Pt. Will be DC after today per his PT. Pt. Understands all exercises to continue w/ at home along w/ his home NMES unit.    Plan:   Continue w/ current POC.     OP EDUCATION:       Goals:     "

## 2024-01-08 DIAGNOSIS — Z98.890 STATUS POST LUMBAR SPINE SURGERY FOR DECOMPRESSION OF SPINAL CORD: Primary | ICD-10-CM

## 2024-02-15 ENCOUNTER — APPOINTMENT (OUTPATIENT)
Dept: PRIMARY CARE | Facility: CLINIC | Age: 74
End: 2024-02-15
Payer: MEDICARE

## 2024-02-16 ENCOUNTER — EVALUATION (OUTPATIENT)
Dept: OCCUPATIONAL THERAPY | Facility: CLINIC | Age: 74
End: 2024-02-16
Payer: MEDICARE

## 2024-02-16 DIAGNOSIS — Z98.890 STATUS POST LUMBAR SPINE SURGERY FOR DECOMPRESSION OF SPINAL CORD: ICD-10-CM

## 2024-02-16 DIAGNOSIS — M48.061 DEGENERATIVE LUMBAR SPINAL STENOSIS: Primary | ICD-10-CM

## 2024-02-16 PROCEDURE — 97165 OT EVAL LOW COMPLEX 30 MIN: CPT | Mod: GO

## 2024-02-16 PROCEDURE — 97530 THERAPEUTIC ACTIVITIES: CPT | Mod: GO
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
## 2024-04-01 ENCOUNTER — LAB (OUTPATIENT)
Dept: LAB | Facility: LAB | Age: 74
End: 2024-04-01
Payer: MEDICARE

## 2024-04-01 DIAGNOSIS — E78.2 HYPERLIPIDEMIA, MIXED: ICD-10-CM

## 2024-04-01 DIAGNOSIS — E11.42 TYPE 2 DIABETES MELLITUS WITH DIABETIC POLYNEUROPATHY, WITHOUT LONG-TERM CURRENT USE OF INSULIN (MULTI): ICD-10-CM

## 2024-04-01 LAB
ALBUMIN SERPL BCP-MCNC: 4.7 G/DL (ref 3.4–5)
ALP SERPL-CCNC: 71 U/L (ref 33–136)
ALT SERPL W P-5'-P-CCNC: 22 U/L (ref 10–52)
ANION GAP SERPL CALC-SCNC: 12 MMOL/L (ref 10–20)
AST SERPL W P-5'-P-CCNC: 27 U/L (ref 9–39)
BILIRUB SERPL-MCNC: 0.5 MG/DL (ref 0–1.2)
BUN SERPL-MCNC: 15 MG/DL (ref 6–23)
CALCIUM SERPL-MCNC: 9.5 MG/DL (ref 8.6–10.3)
CHLORIDE SERPL-SCNC: 102 MMOL/L (ref 98–107)
CO2 SERPL-SCNC: 29 MMOL/L (ref 21–32)
CREAT SERPL-MCNC: 0.98 MG/DL (ref 0.5–1.3)
EGFRCR SERPLBLD CKD-EPI 2021: 82 ML/MIN/1.73M*2
EST. AVERAGE GLUCOSE BLD GHB EST-MCNC: 146 MG/DL
GLUCOSE SERPL-MCNC: 128 MG/DL (ref 74–99)
HBA1C MFR BLD: 6.7 %
POTASSIUM SERPL-SCNC: 5.2 MMOL/L (ref 3.5–5.3)
PROT SERPL-MCNC: 7.3 G/DL (ref 6.4–8.2)
SODIUM SERPL-SCNC: 138 MMOL/L (ref 136–145)
TSH SERPL-ACNC: 2.09 MIU/L (ref 0.44–3.98)

## 2024-04-01 PROCEDURE — 80053 COMPREHEN METABOLIC PANEL: CPT

## 2024-04-01 PROCEDURE — 83036 HEMOGLOBIN GLYCOSYLATED A1C: CPT

## 2024-04-01 PROCEDURE — 36415 COLL VENOUS BLD VENIPUNCTURE: CPT

## 2024-04-01 PROCEDURE — 84443 ASSAY THYROID STIM HORMONE: CPT

## 2024-04-02 ENCOUNTER — OFFICE VISIT (OUTPATIENT)
Dept: PRIMARY CARE | Facility: CLINIC | Age: 74
End: 2024-04-02
Payer: MEDICARE

## 2024-04-02 VITALS
HEIGHT: 68 IN | TEMPERATURE: 98.1 F | DIASTOLIC BLOOD PRESSURE: 72 MMHG | HEART RATE: 90 BPM | OXYGEN SATURATION: 94 % | BODY MASS INDEX: 31.17 KG/M2 | WEIGHT: 205.7 LBS | SYSTOLIC BLOOD PRESSURE: 120 MMHG

## 2024-04-02 DIAGNOSIS — R00.2 PALPITATIONS: ICD-10-CM

## 2024-04-02 DIAGNOSIS — Z00.00 WELL ADULT EXAM: ICD-10-CM

## 2024-04-02 DIAGNOSIS — M48.061 DEGENERATIVE LUMBAR SPINAL STENOSIS: ICD-10-CM

## 2024-04-02 DIAGNOSIS — E11.42 TYPE 2 DIABETES MELLITUS WITH DIABETIC POLYNEUROPATHY, WITHOUT LONG-TERM CURRENT USE OF INSULIN (MULTI): ICD-10-CM

## 2024-04-02 DIAGNOSIS — I49.3 PVC (PREMATURE VENTRICULAR CONTRACTION): ICD-10-CM

## 2024-04-02 DIAGNOSIS — Z12.11 ENCOUNTER FOR SCREENING FOR MALIGNANT NEOPLASM OF COLON: ICD-10-CM

## 2024-04-02 DIAGNOSIS — N13.8 BENIGN LOCALIZED HYPERPLASIA OF PROSTATE WITH URINARY OBSTRUCTION: ICD-10-CM

## 2024-04-02 DIAGNOSIS — G47.33 OSA ON CPAP: ICD-10-CM

## 2024-04-02 DIAGNOSIS — E78.2 HYPERLIPIDEMIA, MIXED: ICD-10-CM

## 2024-04-02 DIAGNOSIS — Z00.00 MEDICARE ANNUAL WELLNESS VISIT, SUBSEQUENT: Primary | ICD-10-CM

## 2024-04-02 DIAGNOSIS — I10 PRIMARY HYPERTENSION: ICD-10-CM

## 2024-04-02 DIAGNOSIS — E66.9 CLASS 1 OBESITY WITH SERIOUS COMORBIDITY AND BODY MASS INDEX (BMI) OF 31.0 TO 31.9 IN ADULT, UNSPECIFIED OBESITY TYPE: ICD-10-CM

## 2024-04-02 DIAGNOSIS — N40.1 BENIGN LOCALIZED HYPERPLASIA OF PROSTATE WITH URINARY OBSTRUCTION: ICD-10-CM

## 2024-04-02 PROCEDURE — 1159F MED LIST DOCD IN RCRD: CPT | Performed by: FAMILY MEDICINE

## 2024-04-02 PROCEDURE — 4010F ACE/ARB THERAPY RXD/TAKEN: CPT | Performed by: FAMILY MEDICINE

## 2024-04-02 PROCEDURE — 3074F SYST BP LT 130 MM HG: CPT | Performed by: FAMILY MEDICINE

## 2024-04-02 PROCEDURE — 3008F BODY MASS INDEX DOCD: CPT | Performed by: FAMILY MEDICINE

## 2024-04-02 PROCEDURE — G0446 INTENS BEHAVE THER CARDIO DX: HCPCS | Performed by: FAMILY MEDICINE

## 2024-04-02 PROCEDURE — 99214 OFFICE O/P EST MOD 30 MIN: CPT | Performed by: FAMILY MEDICINE

## 2024-04-02 PROCEDURE — 3044F HG A1C LEVEL LT 7.0%: CPT | Performed by: FAMILY MEDICINE

## 2024-04-02 PROCEDURE — G0439 PPPS, SUBSEQ VISIT: HCPCS | Performed by: FAMILY MEDICINE

## 2024-04-02 PROCEDURE — 1160F RVW MEDS BY RX/DR IN RCRD: CPT | Performed by: FAMILY MEDICINE

## 2024-04-02 PROCEDURE — 3078F DIAST BP <80 MM HG: CPT | Performed by: FAMILY MEDICINE

## 2024-04-02 PROCEDURE — 99397 PER PM REEVAL EST PAT 65+ YR: CPT | Performed by: FAMILY MEDICINE

## 2024-04-02 PROCEDURE — 1170F FXNL STATUS ASSESSED: CPT | Performed by: FAMILY MEDICINE

## 2024-04-02 RX ORDER — MAGNESIUM L-LACTATE 84 MG
84 TABLET, EXTENDED RELEASE ORAL DAILY
COMMUNITY
Start: 2024-01-06

## 2024-04-02 RX ORDER — METFORMIN HYDROCHLORIDE EXTENDED-RELEASE TABLETS 500 MG/1
1000 TABLET, FILM COATED, EXTENDED RELEASE ORAL
Status: SHIPPED
Start: 2024-04-02

## 2024-04-02 ASSESSMENT — ACTIVITIES OF DAILY LIVING (ADL)
GROCERY_SHOPPING: INDEPENDENT
DRESSING: INDEPENDENT
BATHING: INDEPENDENT
MANAGING_FINANCES: INDEPENDENT
TAKING_MEDICATION: INDEPENDENT
DOING_HOUSEWORK: INDEPENDENT

## 2024-04-02 ASSESSMENT — PATIENT HEALTH QUESTIONNAIRE - PHQ9
2. FEELING DOWN, DEPRESSED OR HOPELESS: NOT AT ALL
1. LITTLE INTEREST OR PLEASURE IN DOING THINGS: NOT AT ALL
SUM OF ALL RESPONSES TO PHQ9 QUESTIONS 1 AND 2: 0

## 2024-04-02 NOTE — PROGRESS NOTES
Subjective   Reason for Visit: Nabor Baldwin is an 73 y.o. male here for a follow up and a  Medicare Wellness visit, annual physical, and follow up monitoring and management of multiple medical conditions.      Past Medical, Surgical, and Family History reviewed and updated in chart.    Reviewed all medications by prescribing practitioner or clinical pharmacist (such as prescriptions, OTCs, herbal therapies and supplements) and documented in the medical record.    HPI    No new  concerns   Labs: 04/04/2024  Colonoscopy: 2019        He has hypertension.  Patient does monitor his BP at home occasionally.  Denies SOB, CP, and dizziness.  Patient is compliant with his antihypertensive therapy and denies any noted side effects.     He has hyperlipidemia.  Patient tries to limit the amount of fatty foods and high cholesterol foods that he consumes.  Patient is compliant with his statin therapy and denies any currently noted side effects.     He has DM Type 2 with diabetic neuropathy.  The patient does try to limit the amount of carbohydrates that he consumes in his diet.  He does monitor sugars at home occasionally but not on an everyday basis.  Pt reports neuropathy in feet bilaterally is stable.     Patient has degenerative lumbar spinal stenosis.   Pt continues to utilize Celebrex with good results.  Underwent emergency surgery in [place] after losing control of his legs.  Pt underwent L2-5 decompression (discectomy/laminectomy) at OSH in [place], [place] on 9/21/23      He has obstructive sleep apnea.  Patient is compliant with the use of his CPAP on a nightly basis.  Patient continues to benefit from CPAP therapy.  He does awaken feeling refreshed.     Patient has BPH with a history of urinary retention.  He underwent HOLMIUM LASER ENUCLEATION OF THE PROSTATE 2/4/22 by Dr. Lou.  He has had significant reduction in obstructive symptoms.     Patient previously experienced palpitations.  Cardiology diagnosed him with  "PVCs and started him on propranolol at bedtime.  This continues to work well with suppressing the PVCs/palpitations.         Patient Self Assessment of Health Status  Patient Self Assessment: Excellent    Nutrition and Exercise  Current Diet: Well Balanced Diet  Adequate Fluid Intake: Yes  Caffeine: Yes  Exercise Frequency: Regularly    Functional Ability/Level of Safety  Cognitive Impairment Observed: No cognitive impairment observed    Home Safety Risk Factors: None    Patient Care Team:  Rodney Maynard DO as PCP - General  Rodney Maynard DO as PCP - [employer] PCP  Benjamin Diane PA-C as Physician Assistant (Neurosurgery)       Review of Systems  Constitutional: Patient denies any fever, chills, loss of appetite, or unexplained weight loss.  Cardiovascular: Patient denies any chest pain, shortness of breath with exertion, tachycardia, palpitations, orthopnea, or paroxysmal nocturnal dyspnea.  Respiratory: Patient denies any cough, shortness breath, or wheezing.  Gastrointestinal: Patient denies any nausea, vomiting, diarrhea, constipation, melena, hematochezia, or reflux symptoms.  Skin: Denies any rashes or skin lesions.   Neurology: Patient denies any new motor or sensory losses.  Denies any numbness, tingling, weakness, and incoordination of the extremities.  Patient also denies any tremor, seizures, or gait instability.  Endocrinology: Denies any polyuria, polydipsia, polyphagia, or heat/cold intolerance.    See HPI also, otherwise ROS is negative if not noted.      Objective   Vitals:  /72   Pulse 90   Temp 36.7 °C (98.1 °F)   Ht 1.727 m (5' 8\")   Wt 93.3 kg (205 lb 11.2 oz)   SpO2 94%   BMI 31.28 kg/m²         Physical Exam  General Appearance: Alert and cooperative, in no acute distress, well-developed/well-nourished, overweight male. Ambulating with a cane.    Neck: Supple and without adenopathy or rigidity.  There is no JVD at 90° and no carotid bruits are noted.  There is no " thyromegaly, thyroid tenderness, or palpable thyroid nodules.  Heart: Regular rate and rhythm without murmur or ectopy.  Respiratory: Lungs are clear to auscultation bilaterally with good air exchange.  Good respiratory effort and no accessory muscle use.  Skin: Good turgor, moist, warm and without rashes or lesions.  Neurological exam: Alert and oriented ×3, no tremor, normal gait.  Extremities: No clubbing, cyanosis, or edema        Assessment/Plan     MEDICARE ANNUAL WELLNESS EXAM / ANNUAL PHYSICAL: Appropriate screenings for the patient's current age were discussed at length.  I encouraged a low-fat/low-cholesterol diet and routine exercise.      HTN: Stable in office readings.  Blood pressure appears adequately controlled and we will continue with the current antihypertensive therapy.     Hyperlipidemia: Stable based on recent labs.  Tolerating current medications. We will continue current medications.  Dietary changes, exercise, and maintenance of healthy weight were discussed.  CARDIOVASCULAR INTENSIVE BEHAVIORAL THERAPY:  I discussed face-to-face with this individual discussing the cardiovascular risk and behavioral therapies of nutritional choices, exercise, and elimination of habits contributing to risk.  We agreed on a plan of how they may be able to reduce the current cardiovascular risk.  For patients with risk calculation greater than 10%, aspirin was discussed and encouraged unless known allergy or increased risk of bleeding contraindicates use.    Patient's 10 year CV risk estimate calculates:    The 10-year ASCVD risk score (Lashaun DOLAN, et al., 2019) is: 34.4%    Values used to calculate the score:      Age: 73 years      Sex: Male      Is Non- : No      Diabetic: Yes      Tobacco smoker: No      Systolic Blood Pressure: 120 mmHg      Is BP treated: Yes      HDL Cholesterol: 40.2 mg/dL      Total Cholesterol: 132 mg/dL  CONTINUE THE CURRENT STATIN THERAPY.     Diabetes  mellitus type 2 with neuropathy: Most recent labs reveal an A1c of:  Lab Results   Component Value Date    HGBA1C 6.7 (H) 2024   Dietary changes, exercise, and maintenance of a healthy weight were discussed.      Obstructive sleep apnea: Stable based on symptoms.  He will continue nightly use of his CPAP machine.  He is compliant with nightly use of CPAP use and has been on CPAP for years now.     Degenerative spinal stenosis at L4/L5: Stable based on symptoms.  We will continue the Celebrex as needed.  Underwent emergency L2-5 decompression (discectomy/laminectomy) at [facility] in [place], [place] on 23.     BPH / Hx of Urinary retention:   Stable based on symptoms.  He will continue to follow up with urology as directed.   HE UNDERWENT LASER ENUCLEATION OF THE PROSTATE BY DR. VILLELA IN THE PAST.  Continues to do well.     Palpitations / PVCs: Patient is currently on propranolol at at bedtime and this continues to work well for him.     Obesity: Dietary changes, exercise, and maintenance of a healthy weight were discussed at length.  Goal is to achieve a BMI less than 25.    Colon cancer screenin2024:  ColoGuard ordered.        COLOGUARD OVERDUE (ordered 2024)  PSA DONE 5/15/2023 - managed by Urologist (Dr. Villela).      Orders Placed This Encounter   Procedures    Cologuard® colon cancer screening     Requested Prescriptions     Signed Prescriptions Disp Refills    metFORMIN, OSM, (Fortamet) 500 mg 24 hr tablet       Sig: Take 2 tablets (1,000 mg) by mouth 2 times a day with meals.    semaglutide 2 mg/dose (8 mg/3 mL) pen injector       Sig: Inject 2 mg under the skin 1 (one) time per week.

## 2024-04-02 NOTE — PATIENT INSTRUCTIONS
Follow up in 3 months.    It was a pleasure to see you today. Thank you for choosing us for your health care needs.    If you have lab or other testing completed and have not been informed of results within one week, please call the office for your results.    If you haven't done so, consider signing up for [facility] [employer], the [facility] personal health record. Ask the staff how you can get started.

## 2024-07-02 ENCOUNTER — APPOINTMENT (OUTPATIENT)
Dept: PRIMARY CARE | Facility: CLINIC | Age: 74
End: 2024-07-02
Payer: MEDICARE

## 2024-07-02 VITALS
BODY MASS INDEX: 33.43 KG/M2 | TEMPERATURE: 98.1 F | SYSTOLIC BLOOD PRESSURE: 132 MMHG | WEIGHT: 220.6 LBS | HEIGHT: 68 IN | HEART RATE: 67 BPM | OXYGEN SATURATION: 95 % | DIASTOLIC BLOOD PRESSURE: 77 MMHG

## 2024-07-02 DIAGNOSIS — N13.8 BENIGN LOCALIZED HYPERPLASIA OF PROSTATE WITH URINARY OBSTRUCTION: ICD-10-CM

## 2024-07-02 DIAGNOSIS — E11.42 TYPE 2 DIABETES MELLITUS WITH DIABETIC POLYNEUROPATHY, WITHOUT LONG-TERM CURRENT USE OF INSULIN (MULTI): ICD-10-CM

## 2024-07-02 DIAGNOSIS — G47.33 OSA ON CPAP: ICD-10-CM

## 2024-07-02 DIAGNOSIS — R00.2 PALPITATIONS: ICD-10-CM

## 2024-07-02 DIAGNOSIS — E78.2 HYPERLIPIDEMIA, MIXED: ICD-10-CM

## 2024-07-02 DIAGNOSIS — I10 PRIMARY HYPERTENSION: Primary | ICD-10-CM

## 2024-07-02 DIAGNOSIS — M48.061 DEGENERATIVE LUMBAR SPINAL STENOSIS: ICD-10-CM

## 2024-07-02 DIAGNOSIS — N40.1 BENIGN LOCALIZED HYPERPLASIA OF PROSTATE WITH URINARY OBSTRUCTION: ICD-10-CM

## 2024-07-02 DIAGNOSIS — E66.9 CLASS 1 OBESITY WITH SERIOUS COMORBIDITY AND BODY MASS INDEX (BMI) OF 33.0 TO 33.9 IN ADULT, UNSPECIFIED OBESITY TYPE: ICD-10-CM

## 2024-07-02 PROCEDURE — 1160F RVW MEDS BY RX/DR IN RCRD: CPT | Performed by: FAMILY MEDICINE

## 2024-07-02 PROCEDURE — 4010F ACE/ARB THERAPY RXD/TAKEN: CPT | Performed by: FAMILY MEDICINE

## 2024-07-02 PROCEDURE — 3075F SYST BP GE 130 - 139MM HG: CPT | Performed by: FAMILY MEDICINE

## 2024-07-02 PROCEDURE — 3008F BODY MASS INDEX DOCD: CPT | Performed by: FAMILY MEDICINE

## 2024-07-02 PROCEDURE — 99214 OFFICE O/P EST MOD 30 MIN: CPT | Performed by: FAMILY MEDICINE

## 2024-07-02 PROCEDURE — 1123F ACP DISCUSS/DSCN MKR DOCD: CPT | Performed by: FAMILY MEDICINE

## 2024-07-02 PROCEDURE — 3078F DIAST BP <80 MM HG: CPT | Performed by: FAMILY MEDICINE

## 2024-07-02 PROCEDURE — 3044F HG A1C LEVEL LT 7.0%: CPT | Performed by: FAMILY MEDICINE

## 2024-07-02 PROCEDURE — 1159F MED LIST DOCD IN RCRD: CPT | Performed by: FAMILY MEDICINE

## 2024-07-02 PROCEDURE — 1158F ADVNC CARE PLAN TLK DOCD: CPT | Performed by: FAMILY MEDICINE

## 2024-07-02 RX ORDER — ICOSAPENT ETHYL 1 G/1
2 CAPSULE ORAL
Qty: 360 CAPSULE | Refills: 0 | Status: SHIPPED | OUTPATIENT
Start: 2024-07-02

## 2024-07-02 ASSESSMENT — PATIENT HEALTH QUESTIONNAIRE - PHQ9
1. LITTLE INTEREST OR PLEASURE IN DOING THINGS: NOT AT ALL
2. FEELING DOWN, DEPRESSED OR HOPELESS: NOT AT ALL
SUM OF ALL RESPONSES TO PHQ9 QUESTIONS 1 AND 2: 0

## 2024-07-02 NOTE — PATIENT INSTRUCTIONS
Follow up in 3 months.    It was a pleasure to see you today. Thank you for choosing us for your health care needs.    If you have lab or other testing completed and have not been informed of results within one week, please call the office for your results.    If you haven't done so, consider signing up for [facility] [employer], the [facility] personal health record. Ask the staff how you can get started.

## 2024-07-02 NOTE — PROGRESS NOTES
Subjective   Patient ID: Nabor Baldiwn is a 73 y.o. male who presents for Follow-up.    HPI       No new concerns   [facility]  Colonoscopy: 2020         He has hypertension.  Patient does monitor his BP at home occasionally.  Denies SOB, CP, and dizziness.  Patient is compliant with his antihypertensive therapy and denies any noted side effects.     He has hyperlipidemia.  Patient tries to limit the amount of fatty foods and high cholesterol foods that he consumes.  Patient is compliant with his statin therapy and denies any currently noted side effects.     He has DM Type 2 with diabetic neuropathy.  The patient does try to limit the amount of carbohydrates that he consumes in his diet.  He does monitor sugars at home occasionally but not on an everyday basis.  Pt reports neuropathy in feet bilaterally is stable.     Patient has degenerative lumbar spinal stenosis.   Pt continues to utilize Celebrex with good results.  Underwent emergency surgery in [place] after losing control of his legs.  Pt underwent L2-5 decompression (discectomy/laminectomy) at [facility] in [place], [place] on 9/21/23      He has obstructive sleep apnea.  Patient is compliant with the use of his CPAP on a nightly basis.  Patient continues to benefit from CPAP therapy.  He does awaken feeling refreshed.     Patient has BPH with a history of urinary retention.  He underwent HOLMIUM LASER ENUCLEATION OF THE PROSTATE 2/4/22 by Dr. Lou.  He continues to have significant reduction in obstructive symptoms.     Patient previously experienced palpitations.  Cardiology diagnosed him with PVCs and started him on propranolol at bedtime.  This continues to work well with suppressing the PVCs/palpitations.        Review of Systems  Constitutional: Patient denies any fever, chills, loss of appetite, or unexplained weight loss.  HEENT: Denies any headache, sore throat, eye pain, ear pain, decreased vision, or decreased hearing. Patient also denies any  "rhinorrhea.  Cardiovascular: Patient denies any chest pain, shortness of breath with exertion, tachycardia, palpitations, orthopnea, or paroxysmal nocturnal dyspnea.  Respiratory: Patient denies any cough, shortness breath, or wheezing.  Gastrointestinal patient denies any nausea, vomiting, diarrhea, constipation, melena, hematochezia, or reflux symptoms  Musculoskeletal: Patient denies any myalgia, arthralgia, joint swelling, or joint deformity  Skin: Denies any rashes or skin lesions  Neurology: Patient denies any new motor or sensory losses. Denies any numbness, tingling, weakness, and incoordination of the extremities. Patient also denies any tremor, seizures, or gait instability.  Endocrinology: Denies any polyuria, polydipsia, polyphagia, or heat/cold intolerance.      Objective   /77   Pulse 67   Temp 36.7 °C (98.1 °F)   Ht 1.727 m (5' 8\")   Wt 100 kg (220 lb 9.6 oz)   SpO2 95%   BMI 33.54 kg/m²     Physical Exam  Gen. Appearance: Alert and cooperative, no acute distress, well-developed/well-nourished, obese male.     Head: Normocephalic and atraumatic  Neck: Supple and without adenopathy, no JVD at 90° and no carotid bruits are noted. There is no thyromegaly, thyroid tenderness, or palpable thyroid nodules.  Cardiovascular: Regular rate and rhythm without murmur or ectopy.  Respiratory: Clear to auscultation bilaterally with good air exchange.  Skin: Good turgor, moist, warm and without rashes or lesions.  Lymph nodes: No cervical, clavicular, or inguinal adenopathy.  Neurological exam: Alert and oriented ×3, no tremor, normal gait.  Psychiatric: Appropriate mood and affect  Extremities: No clubbing, cyanosis, or edema      Assessment/Plan       HTN: Stable in office readings.  Blood pressure appears adequately controlled and we will continue with the current antihypertensive therapy.     Hyperlipidemia: Stable based on recent labs.  Tolerating current medications. We will continue current " medications.  Dietary changes, exercise, and maintenance of healthy weight were discussed.    Diabetes mellitus type 2 with neuropathy: Most recent labs reveal an A1c of:  Lab Results   Component Value Date    HGBA1C 6.7 (H) 04/01/2024   [facility]  Dietary changes, exercise, and maintenance of a healthy weight were discussed.      Obstructive sleep apnea: Stable based on symptoms.  He will continue nightly use of his CPAP machine.  He is compliant with nightly use of CPAP use and has been on CPAP for years now.     Degenerative spinal stenosis at L4/L5: Stable based on symptoms.  We will continue the Celebrex as needed.  Underwent emergency L2-5 decompression (discectomy/laminectomy) at OSH in [place], [place] on 9/21/23.     BPH / Hx of Urinary retention:   Stable based on symptoms.  He will continue to follow up with urology as directed.   HE UNDERWENT LASER ENUCLEATION OF THE PROSTATE BY DR. VILLELA IN THE PAST.  Continues to do well.     Palpitations / PVCs: Patient is currently on propranolol at bedtime.  This continues to work well for him.     Obesity: Dietary changes, exercise, and maintenance of a healthy weight were discussed at length.  Goal is to achieve a BMI less than 25.        COLOGUARD OVERDUE (ordered 4/2/2024)  PSA DONE 5/15/2023 - managed by Urologist (Dr. Villela).    Follow up in 3 months.       Scribe Attestation  By signing my name below, ICatina Scribe   attest that this documentation has been prepared under the direction and in the presence of Rodney Maynard DO.    Requested Prescriptions     Signed Prescriptions Disp Refills    icosapent ethyL (Vascepa) 1 gram capsule 360 capsule 0     Sig: Take 2 capsules (2 g) by mouth 2 times daily (morning and late afternoon).

## 2024-08-08 ENCOUNTER — APPOINTMENT (OUTPATIENT)
Dept: CARDIOLOGY | Facility: CLINIC | Age: 74
End: 2024-08-08
Payer: MEDICARE

## 2024-08-28 ENCOUNTER — OFFICE VISIT (OUTPATIENT)
Dept: CARDIOLOGY | Facility: CLINIC | Age: 74
End: 2024-08-28
Payer: MEDICARE

## 2024-08-28 VITALS
HEIGHT: 68 IN | OXYGEN SATURATION: 96 % | SYSTOLIC BLOOD PRESSURE: 116 MMHG | DIASTOLIC BLOOD PRESSURE: 72 MMHG | BODY MASS INDEX: 31.46 KG/M2 | WEIGHT: 207.6 LBS | HEART RATE: 76 BPM

## 2024-08-28 DIAGNOSIS — E78.2 HYPERLIPIDEMIA, MIXED: ICD-10-CM

## 2024-08-28 DIAGNOSIS — I10 PRIMARY HYPERTENSION: Primary | ICD-10-CM

## 2024-08-28 DIAGNOSIS — I49.3 PVC (PREMATURE VENTRICULAR CONTRACTION): ICD-10-CM

## 2024-08-28 PROCEDURE — 1036F TOBACCO NON-USER: CPT | Performed by: INTERNAL MEDICINE

## 2024-08-28 PROCEDURE — 99213 OFFICE O/P EST LOW 20 MIN: CPT | Performed by: INTERNAL MEDICINE

## 2024-08-28 PROCEDURE — 1159F MED LIST DOCD IN RCRD: CPT | Performed by: INTERNAL MEDICINE

## 2024-08-28 PROCEDURE — 93010 ELECTROCARDIOGRAM REPORT: CPT | Performed by: INTERNAL MEDICINE

## 2024-08-28 PROCEDURE — 3074F SYST BP LT 130 MM HG: CPT | Performed by: INTERNAL MEDICINE

## 2024-08-28 PROCEDURE — 93005 ELECTROCARDIOGRAM TRACING: CPT | Performed by: INTERNAL MEDICINE

## 2024-08-28 PROCEDURE — 3044F HG A1C LEVEL LT 7.0%: CPT | Performed by: INTERNAL MEDICINE

## 2024-08-28 PROCEDURE — 3008F BODY MASS INDEX DOCD: CPT | Performed by: INTERNAL MEDICINE

## 2024-08-28 PROCEDURE — 1160F RVW MEDS BY RX/DR IN RCRD: CPT | Performed by: INTERNAL MEDICINE

## 2024-08-28 PROCEDURE — 4010F ACE/ARB THERAPY RXD/TAKEN: CPT | Performed by: INTERNAL MEDICINE

## 2024-08-28 PROCEDURE — 3078F DIAST BP <80 MM HG: CPT | Performed by: INTERNAL MEDICINE

## 2024-08-28 PROCEDURE — 1126F AMNT PAIN NOTED NONE PRSNT: CPT | Performed by: INTERNAL MEDICINE

## 2024-08-28 ASSESSMENT — COLUMBIA-SUICIDE SEVERITY RATING SCALE - C-SSRS
1. IN THE PAST MONTH, HAVE YOU WISHED YOU WERE DEAD OR WISHED YOU COULD GO TO SLEEP AND NOT WAKE UP?: NO
6. HAVE YOU EVER DONE ANYTHING, STARTED TO DO ANYTHING, OR PREPARED TO DO ANYTHING TO END YOUR LIFE?: NO
2. HAVE YOU ACTUALLY HAD ANY THOUGHTS OF KILLING YOURSELF?: NO

## 2024-08-28 ASSESSMENT — PAIN SCALES - GENERAL: PAINLEVEL: 0-NO PAIN

## 2024-08-28 NOTE — PROGRESS NOTES
"Subjective   Nabor Baldwin is a 74 y.o. male who presents to the [facility]  for evaluation of palpations. Last seen in August 2023.      Since our last visit, Mr. Baldwin has well controlled palpitation symptoms since taking propranolol 20 mg a day at night. He occasionally uses a dose during the day (1 x/month on average). No longer waking up from sleep because of the symptoms.      He had episode of atypical chest pain in June 2019 that prompted nuclear stress testing which was normal. No further chest pain since then.     At this visit, no chest pain, dyspnea on exertion, PND, orthopnea, syncope, or claudication. He is getting traced ankle edema at night that resolves with leg elevation.     Has increased walking frequency compared to a year ago. Now taking Ozempic for diabetes management [facility]. LDL at goal at 60 on last check in 2023 after starting Ozempic down from  on January 2022 lipid panel.     ZioPatch monitor done in January shows no concerning arrhythmia with a PVC burden of 23% of total heart rates prior to starting propranolol. His palpitation sensation was characterized as chest discomfort with sensation of \"skipped beats\" and \"hard heart beat\" after the skipped feeling.     Past Medical History:  1. PVCs/Palpitations  2. Hypertension  3. Dyslipidemia  4. Diabetes Mellitus, type 2  5. Obstructive sleep apnea on CPAP  6. Spinal stenosis s/p L2-L5 decompression surgery 9/21/2023 in [place], [place] per 7/2024 PCP note  7. BPH s/p holmium lasar therapy by Dr. Lou     Social History:  Former smoker (quit at 32 yo)     Family History:  Father had CAD and PCI x2, and a \"valve problem\". Mother required a pacemaker. Sister has AFib.    Review of Systems    A 14 point review of systems was asked. All questions were negative except for pertinent positives listed in the HPI.     Current Outpatient Medications on File Prior to Visit   Medication Sig Dispense Refill    " "celecoxib (CeleBREX) 200 mg capsule Take 1 capsule (200 mg) by mouth 2 times a day.      gabapentin (Neurontin) 300 mg capsule Take 1 capsule (300 mg) by mouth 2 times a day.      HYDROcodone-acetaminophen (Norco) 5-325 mg tablet Take 2 tablets by mouth every 6 hours if needed.      icosapent ethyL (Vascepa) 1 gram capsule Take 2 capsules (2 g) by mouth 2 times daily (morning and late afternoon). 360 capsule 0    losartan (Cozaar) 25 mg tablet Take 1 tablet (25 mg) by mouth once daily.      magnesium lactate CR (Magtab) 84 mg ER tablet Take 1 tablet (84 mg) by mouth once daily.      metFORMIN, OSM, (Fortamet) 500 mg 24 hr tablet Take 2 tablets (1,000 mg) by mouth 2 times a day with meals.      multivit-min-ferrous sulfate (One Daily Multi-Vit w-Mineral) 4.5 mg iron tablet TAKE  BY MOUTH EVERY DAY      OneTouch Delica Plus Lancet 33 gauge misc CHECK BLOOD GLUCOSE EVERY DAY      OneTouch Ultra Test strip CHECK BLOOD GLUCOSE EVERY DAY      OneTouch Ultra2 Meter misc TEST BLOOD GLUCOSE EVERY DAY      propranolol (Inderal) 20 mg tablet Take 0.5 tablets (10 mg) by mouth 2 times a day.      rosuvastatin (Crestor) 10 mg tablet Take 1 tablet (10 mg) by mouth once daily.      semaglutide 2 mg/dose (8 mg/3 mL) pen injector Inject 2 mg under the skin 1 (one) time per week.      Stool Softener 100 mg capsule Take 1 capsule (100 mg) by mouth if needed.      tiZANidine (Zanaflex) 4 mg tablet Take 1 tablet (4 mg) by mouth every 8 hours if needed.       No current facility-administered medications on file prior to visit.         Objective   Physical Exam  BP Readings from Last 3 Encounters:   08/28/24 116/72   07/02/24 132/77   04/02/24 120/72      Wt Readings from Last 3 Encounters:   08/28/24 94.2 kg (207 lb 9.6 oz)   07/02/24 100 kg (220 lb 9.6 oz)   04/02/24 93.3 kg (205 lb 11.2 oz)      BMI: Estimated body mass index is 31.57 kg/m² as calculated from the following:    Height as of this encounter: 1.727 m (5' 8\").    Weight as " "of this encounter: 94.2 kg (207 lb 9.6 oz).  BSA: Estimated body surface area is 2.13 meters squared as calculated from the following:    Height as of this encounter: 1.727 m (5' 8\").    Weight as of this encounter: 94.2 kg (207 lb 9.6 oz).    General: no acute distress  HEENT: EOMI, no scleral icterus.  Lungs: Clear to auscultation bilaterally without wheezing, rales, or rhonchi.  Cardiovascular: Regular rhythm and rate. Normal S1 and S2. No murmurs, rubs, or gallops are appreciated. JVP normal.  Abdomen: Soft, nontender, nondistended. Bowel sounds present.  Extremities: Warm and well perfused with equal 2+ pulses bilaterally.  No edema present.  Neurologic: Alert and oriented x3.      I have personally reviewed the following images and laboratory findings:  Last echocardiogram:  None on file    Last cath / stress test:  2019 SPECT pharm nuclear stress test: No myocardial perfusion defect for scar or ischemia. Inferior wall diaphragmatic artifact. LV EF 50% estimate.    Most recent EC2024 EC/3/2023 ECG: Sinus rhythm, 82 bpm, occ PVCs. Normal ECG. Personally reviewed in office.     Lab Results   Component Value Date    CHOL 132 2023    CHOL 117 2022    CHOL 186 2022     Lab Results   Component Value Date    HDL 40.2 2023    HDL 38.0 (A) 2022    HDL 30.0 (A) 2022     No results found for: \"LDLCALC\"  Lab Results   Component Value Date    TRIG 248 (H) 2023    TRIG 97 2022    TRIG 196 (H) 2022     No components found for: \"CHOLHDL\"   2023 LDL 60     Assessment/Plan   1. Palpitations/PVCs:  Frequent PVCs on 2019 ZioPatch HR monitor (23% of total heart beats prior to BB initiation, about 5% now). Continue propranolol 20 mg at night. Has not required additional doses PRN. Can be taken up to 3x/day based on symptoms. Symptom well controlled at today's visit.     2. Dyslipidemia:  2022 lipid panel with improved TG 97 and LDL 60 " taking rosuvastatin 10 mg a day and Vascepa 4 gm/day. Continue physical activity as part of lifestyle modification plan.      3. Hypertension:  Blood pressure at goal.      CV risk factors are optimized at this time. Ozempic for diabetes care is also reducing future CVD risk by > 20%.     Follow up with Dr. Arguelles in 12 months.               SIGNATURE: Rubin Arguelles M.D.  [facility]  Senior Attending, Division of Cardiovascular Medicine  Co-Director, [facility]   of Medicine  [facility] [employer] [facility]  [address] [facility] [address]  [place] [id number]  Phone: [phone]  Fax: [phone]  Appointment: [phone]  PATIENT NAME: Nabor Baldwin   DATE/TIME: August 28, 2024 10:09 AM MRN: [id number]

## 2024-08-28 NOTE — PATIENT INSTRUCTIONS
You were seen in the [facility] for your palpitation symptoms.     Your ECG today in the office is normal without the extra heart beats called premature ventricular contractions (PVCs) seen on prior ECGs. Your heart exam today is normal. Your Heart Rate monitor in January 2019 showed the same PVC frequent extra heart beats.      These extra beats give you that skipped heart beat sensation that were causing your at night chest discomfort and palpitations.      Continue to take propranolol 20 mg a day at night and extra doses during the day as needed. It can be taken up to 3 times a day to suppress these extra beats. Propranolol works by blocking adrenaline to the heart and lowers your resting heart rate which can prevent these extra beats.      Your blood pressure is at goal taking losartan 25 mg a day.     Your cholesterol blood works is at goal. Continue to take atorvastatin 20 mg a day and Vascepa 4 gram/day for your cholesterol level to prevent heart artery hardening. Your Ozempic diabetes medication is also protecting you from a future heart attack.     Continue your physical activity program (walking or aerobic exercise) 30 minutes 3-5 times a week to protect your heart and control blood pressure.     Follow up with Dr. Arguelles in 12 months.

## 2024-08-29 LAB
ATRIAL RATE: 78 BPM
P AXIS: 45 DEGREES
P OFFSET: 163 MS
P ONSET: 107 MS
PR INTERVAL: 234 MS
Q ONSET: 224 MS
QRS COUNT: 12 BEATS
QRS DURATION: 94 MS
QT INTERVAL: 354 MS
QTC CALCULATION(BAZETT): 403 MS
QTC FREDERICIA: 386 MS
R AXIS: -20 DEGREES
T AXIS: 60 DEGREES
T OFFSET: 401 MS
VENTRICULAR RATE: 78 BPM

## 2024-10-03 ENCOUNTER — APPOINTMENT (OUTPATIENT)
Dept: PRIMARY CARE | Facility: CLINIC | Age: 74
End: 2024-10-03
Payer: MEDICARE

## 2024-10-03 VITALS
WEIGHT: 209.8 LBS | DIASTOLIC BLOOD PRESSURE: 72 MMHG | HEIGHT: 68 IN | BODY MASS INDEX: 31.8 KG/M2 | SYSTOLIC BLOOD PRESSURE: 122 MMHG | HEART RATE: 77 BPM | TEMPERATURE: 98.1 F | OXYGEN SATURATION: 96 %

## 2024-10-03 DIAGNOSIS — N13.8 BENIGN LOCALIZED HYPERPLASIA OF PROSTATE WITH URINARY OBSTRUCTION: ICD-10-CM

## 2024-10-03 DIAGNOSIS — G47.33 OSA ON CPAP: ICD-10-CM

## 2024-10-03 DIAGNOSIS — Z11.59 NEED FOR HEPATITIS C SCREENING TEST: ICD-10-CM

## 2024-10-03 DIAGNOSIS — M48.061 DEGENERATIVE LUMBAR SPINAL STENOSIS: ICD-10-CM

## 2024-10-03 DIAGNOSIS — E78.2 HYPERLIPIDEMIA, MIXED: ICD-10-CM

## 2024-10-03 DIAGNOSIS — E66.811 CLASS 1 OBESITY WITH SERIOUS COMORBIDITY AND BODY MASS INDEX (BMI) OF 33.0 TO 33.9 IN ADULT, UNSPECIFIED OBESITY TYPE: ICD-10-CM

## 2024-10-03 DIAGNOSIS — R00.2 PALPITATIONS: ICD-10-CM

## 2024-10-03 DIAGNOSIS — E11.42 TYPE 2 DIABETES MELLITUS WITH DIABETIC POLYNEUROPATHY, WITHOUT LONG-TERM CURRENT USE OF INSULIN: ICD-10-CM

## 2024-10-03 DIAGNOSIS — N40.1 BENIGN LOCALIZED HYPERPLASIA OF PROSTATE WITH URINARY OBSTRUCTION: ICD-10-CM

## 2024-10-03 DIAGNOSIS — I10 PRIMARY HYPERTENSION: Primary | ICD-10-CM

## 2024-10-03 DIAGNOSIS — Z23 NEED FOR VACCINATION: ICD-10-CM

## 2024-10-03 PROCEDURE — 3008F BODY MASS INDEX DOCD: CPT | Performed by: FAMILY MEDICINE

## 2024-10-03 PROCEDURE — 1158F ADVNC CARE PLAN TLK DOCD: CPT | Performed by: FAMILY MEDICINE

## 2024-10-03 PROCEDURE — 90662 IIV NO PRSV INCREASED AG IM: CPT | Performed by: FAMILY MEDICINE

## 2024-10-03 PROCEDURE — 3044F HG A1C LEVEL LT 7.0%: CPT | Performed by: FAMILY MEDICINE

## 2024-10-03 PROCEDURE — 3078F DIAST BP <80 MM HG: CPT | Performed by: FAMILY MEDICINE

## 2024-10-03 PROCEDURE — G0008 ADMIN INFLUENZA VIRUS VAC: HCPCS | Performed by: FAMILY MEDICINE

## 2024-10-03 PROCEDURE — 1159F MED LIST DOCD IN RCRD: CPT | Performed by: FAMILY MEDICINE

## 2024-10-03 PROCEDURE — 1160F RVW MEDS BY RX/DR IN RCRD: CPT | Performed by: FAMILY MEDICINE

## 2024-10-03 PROCEDURE — 3074F SYST BP LT 130 MM HG: CPT | Performed by: FAMILY MEDICINE

## 2024-10-03 PROCEDURE — 4010F ACE/ARB THERAPY RXD/TAKEN: CPT | Performed by: FAMILY MEDICINE

## 2024-10-03 PROCEDURE — 99214 OFFICE O/P EST MOD 30 MIN: CPT | Performed by: FAMILY MEDICINE

## 2024-10-03 PROCEDURE — 1123F ACP DISCUSS/DSCN MKR DOCD: CPT | Performed by: FAMILY MEDICINE

## 2024-10-03 NOTE — PROGRESS NOTES
Subjective   Patient ID: Nabor Baldwin is a 74 y.o. male who presents for Follow-up.    HPI     No new concerns     Pt ran out of BP medication recently and has been back on it for 3 days.    His magnesium was upped to 4 tablets per day [facility].  He denies diarrhea.     Pt states he found shoes online called [employer] to help with his drop foot. He states he has not had any falls since he started wearing these shoes.    Pt declined COVID vaccine today.    Colonoscopy: 7/2020  Cologuard: 04/02/2024- negative     He has hypertension.  Patient does monitor his BP at home occasionally.  Denies SOB, CP, and dizziness.  Patient is compliant with his antihypertensive therapy and denies any noted side effects.     He has hyperlipidemia.  Patient tries to limit the amount of fatty foods and high cholesterol foods that he consumes.  Patient is compliant with his statin therapy and denies any currently noted side effects.     He has DM Type 2 with diabetic neuropathy.  The patient does try to limit the amount of carbohydrates that he consumes in his diet.  He does monitor sugars at home occasionally but not on an everyday basis.  Neuropathy symptoms in feet bilaterally has been stable.     Patient has degenerative lumbar spinal stenosis.   Pt continues to utilize Celebrex with good results.  Underwent emergency surgery in [place] after losing control of his legs.  Pt underwent L2-5 decompression (discectomy/laminectomy) at [facility] in [place], [place] on 9/21/23.  He does has some residual right foot drop      He has obstructive sleep apnea.  Patient is compliant with the use of his CPAP on a nightly basis.  Patient continues to benefit from CPAP therapy.  He does awaken feeling refreshed.     Patient has BPH with a history of urinary retention.  He underwent HOLMIUM LASER ENUCLEATION OF THE PROSTATE 2/4/22 by Dr. Lou.  He continues to have significant reduction in obstructive symptoms.     Patient previously experienced  "palpitations.  Cardiology diagnosed him with PVCs and started him on propranolol at bedtime.  This continues to work well with suppressing the PVCs/palpitations.           Review of Systems  Constitutional: Patient denies any fever, chills, loss of appetite, or unexplained weight loss.  Cardiovascular: Patient denies any chest pain, shortness of breath with exertion, tachycardia, palpitations, orthopnea, or paroxysmal nocturnal dyspnea.  Respiratory: Patient denies any cough, shortness breath, or wheezing.  Gastrointestinal: Patient denies any nausea, vomiting, diarrhea, constipation, melena, hematochezia, or reflux symptoms  Skin: Denies any rashes or skin lesions  Neurology: Patient denies any new motor or sensory losses. Denies any numbness, tingling, weakness, and incoordination of the extremities. Patient also denies any tremor, seizures, or gait instability.  Endocrinology: Denies any polyuria, polydipsia, polyphagia, or heat/cold intolerance.    Objective   /72   Pulse 77   Temp 36.7 °C (98.1 °F) (Temporal)   Ht 1.727 m (5' 8\")   Wt 95.2 kg (209 lb 12.8 oz)   SpO2 96%   BMI 31.90 kg/m²     Physical Exam  General Appearance: Alert and cooperative, in no acute distress, well-developed/well-nourished, obese male ambulating with a cane.    Neck: Supple and without adenopathy or rigidity. There is no JVD at 90° and no carotid bruits are noted. There is no thyromegaly, thyroid tenderness, or palpable thyroid nodules.  Heart: Regular rate and rhythm without murmur or ectopy.  Lungs: Clear to auscultation bilaterally with good air exchange.  Skin: Good turgor, moist, warm and without rashes or lesions.  Neurological exam: Alert and oriented ×3, no tremor, normal gait.    Extremities: No clubbing, cyanosis, or edema.  AFO noted to right lower leg.      Assessment/Plan     HTN:   Stable based on in office readings.  Blood pressure appears adequately controlled and we will continue with the current " antihypertensive therapy.     Hyperlipidemia:   Stable based on most recent labs.  Tolerating current medications. We will continue current medications.  Dietary changes, exercise, and maintenance of healthy weight were discussed.     Diabetes mellitus type 2 with neuropathy and w/o long-term insulin use:   Most recent labs reveal an A1c of:  [facility]  Dietary changes, exercise, and maintenance of a healthy weight were discussed.      Obstructive sleep apnea: Stable based on symptoms.  He will continue nightly use of his CPAP machine.  He is compliant with nightly use of CPAP use and has been on CPAP for years now.     Degenerative spinal stenosis at L4/L5:   Stable based on symptoms.  We will continue the Celebrex as needed.  Underwent emergency L2-5 decompression (discectomy/laminectomy) at [facility] in [place], [place] on 9/21/23.      BPH / Hx of Urinary retention:   Stable based on symptoms.  He will continue to follow up with urology as directed.   HE UNDERWENT LASER ENUCLEATION OF THE PROSTATE BY DR. VILLELA IN THE PAST.  Obstructive symptoms continue to be much improved.     Palpitations / PVCs:   Patient is currently on propranolol at bedtime.  This continues to work well for him.     Obesity: Dietary changes, exercise, and maintenance of a healthy weight were discussed at length.  Goal is to achieve a BMI less than 25.     Need for vaccination:  Flu vaccine administered today.    Need for hepatitis C screening test:  Ordered hepatitis C antibody to be done with his next labs of importance to Medicare recommendations to screen everyone born between 1944 and 1965.       COLOGUARD OVERDUE (ordered 4/2/2024 but not yet completed).  Encouraged to complete in near future.  [facility] Follows with Dr. Villela (urology)     Follow up in 3 months.       Scribe Attestation  By signing my name below, I, Catina Arguelles , [place]   attest that this documentation has been prepared  under the direction and in the presence of Rodney Maynard DO.    Orders Placed This Encounter   Procedures    Flu vaccine, trivalent, preservative free, HIGH-DOSE, age 65y+ (Fluzone)    Hepatitis C Antibody    Basic Metabolic Panel    Hemoglobin A1C    Lipid Panel    Albumin-Creatinine Ratio, Urine Random    TSH with reflex to Free T4 if abnormal

## 2024-10-03 NOTE — PATIENT INSTRUCTIONS
Follow up in 3 months.    It was a pleasure to see you today. Thank you for choosing us for your health care needs.    If you have lab or other testing completed and have not been informed of results within one week, please call the office for your results.    If you haven't done so, consider signing up for [facility] [employer], the [facility] personal health record. Ask the staff how you can get started.

## 2024-11-14 ENCOUNTER — TELEPHONE (OUTPATIENT)
Dept: PRIMARY CARE | Facility: CLINIC | Age: 74
End: 2024-11-14
Payer: MEDICARE

## 2024-12-30 ENCOUNTER — TELEPHONE (OUTPATIENT)
Dept: PRIMARY CARE | Facility: CLINIC | Age: 74
End: 2024-12-30

## 2024-12-30 ENCOUNTER — LAB (OUTPATIENT)
Dept: LAB | Facility: LAB | Age: 74
End: 2024-12-30
Payer: OTHER GOVERNMENT

## 2024-12-30 DIAGNOSIS — I10 PRIMARY HYPERTENSION: ICD-10-CM

## 2024-12-30 DIAGNOSIS — Z11.59 NEED FOR HEPATITIS C SCREENING TEST: ICD-10-CM

## 2024-12-30 DIAGNOSIS — Z12.5 PROSTATE CANCER SCREENING: ICD-10-CM

## 2024-12-30 DIAGNOSIS — E78.2 HYPERLIPIDEMIA, MIXED: ICD-10-CM

## 2024-12-30 DIAGNOSIS — Z12.5 PROSTATE CANCER SCREENING: Primary | ICD-10-CM

## 2024-12-30 DIAGNOSIS — E11.42 TYPE 2 DIABETES MELLITUS WITH DIABETIC POLYNEUROPATHY, WITHOUT LONG-TERM CURRENT USE OF INSULIN: ICD-10-CM

## 2024-12-30 LAB
ANION GAP SERPL CALC-SCNC: 13 MMOL/L (ref 10–20)
BUN SERPL-MCNC: 17 MG/DL (ref 6–23)
CALCIUM SERPL-MCNC: 9.6 MG/DL (ref 8.6–10.3)
CHLORIDE SERPL-SCNC: 103 MMOL/L (ref 98–107)
CHOLEST SERPL-MCNC: 117 MG/DL (ref 0–199)
CHOLESTEROL/HDL RATIO: 2.7
CO2 SERPL-SCNC: 27 MMOL/L (ref 21–32)
CREAT SERPL-MCNC: 1 MG/DL (ref 0.5–1.3)
CREAT UR-MCNC: 105.8 MG/DL (ref 20–370)
EGFRCR SERPLBLD CKD-EPI 2021: 79 ML/MIN/1.73M*2
EST. AVERAGE GLUCOSE BLD GHB EST-MCNC: 154 MG/DL
GLUCOSE SERPL-MCNC: 129 MG/DL (ref 74–99)
HBA1C MFR BLD: 7 %
HCV AB SER QL: NONREACTIVE
HDLC SERPL-MCNC: 43.9 MG/DL
LDLC SERPL CALC-MCNC: 49 MG/DL
MICROALBUMIN UR-MCNC: 21.3 MG/L
MICROALBUMIN/CREAT UR: 20.1 UG/MG CREAT
NON HDL CHOLESTEROL: 73 MG/DL (ref 0–149)
POTASSIUM SERPL-SCNC: 4.5 MMOL/L (ref 3.5–5.3)
PSA SERPL-MCNC: 0.98 NG/ML
SODIUM SERPL-SCNC: 138 MMOL/L (ref 136–145)
TRIGL SERPL-MCNC: 120 MG/DL (ref 0–149)
TSH SERPL-ACNC: 3.33 MIU/L (ref 0.44–3.98)
VLDL: 24 MG/DL (ref 0–40)

## 2024-12-30 PROCEDURE — 84443 ASSAY THYROID STIM HORMONE: CPT

## 2024-12-30 PROCEDURE — 82570 ASSAY OF URINE CREATININE: CPT

## 2024-12-30 PROCEDURE — 80048 BASIC METABOLIC PNL TOTAL CA: CPT

## 2024-12-30 PROCEDURE — 83036 HEMOGLOBIN GLYCOSYLATED A1C: CPT

## 2024-12-30 PROCEDURE — 86803 HEPATITIS C AB TEST: CPT

## 2024-12-30 PROCEDURE — 80061 LIPID PANEL: CPT

## 2024-12-30 PROCEDURE — 84153 ASSAY OF PSA TOTAL: CPT

## 2024-12-30 PROCEDURE — 82043 UR ALBUMIN QUANTITATIVE: CPT

## 2024-12-30 NOTE — TELEPHONE ENCOUNTER
Dr Maynard pt stopped in the office, he had labs drawn today but there was no psa order in the system. Lab did draw the blood but needs order placed if he is wanting the PSA.

## 2025-01-09 ENCOUNTER — APPOINTMENT (OUTPATIENT)
Dept: PRIMARY CARE | Facility: CLINIC | Age: 75
End: 2025-01-09
Payer: MEDICARE

## 2025-01-09 VITALS
HEIGHT: 68 IN | HEART RATE: 82 BPM | WEIGHT: 211.8 LBS | BODY MASS INDEX: 32.1 KG/M2 | DIASTOLIC BLOOD PRESSURE: 81 MMHG | TEMPERATURE: 98.2 F | OXYGEN SATURATION: 95 % | SYSTOLIC BLOOD PRESSURE: 135 MMHG

## 2025-01-09 DIAGNOSIS — E11.42 TYPE 2 DIABETES MELLITUS WITH DIABETIC POLYNEUROPATHY, WITHOUT LONG-TERM CURRENT USE OF INSULIN: ICD-10-CM

## 2025-01-09 DIAGNOSIS — E78.2 HYPERLIPIDEMIA, MIXED: ICD-10-CM

## 2025-01-09 DIAGNOSIS — Z00.00 WELL ADULT EXAM: ICD-10-CM

## 2025-01-09 DIAGNOSIS — N40.1 BENIGN LOCALIZED HYPERPLASIA OF PROSTATE WITH URINARY OBSTRUCTION: ICD-10-CM

## 2025-01-09 DIAGNOSIS — N13.8 BENIGN LOCALIZED HYPERPLASIA OF PROSTATE WITH URINARY OBSTRUCTION: ICD-10-CM

## 2025-01-09 DIAGNOSIS — I49.3 PVC (PREMATURE VENTRICULAR CONTRACTION): ICD-10-CM

## 2025-01-09 DIAGNOSIS — M48.061 DEGENERATIVE LUMBAR SPINAL STENOSIS: ICD-10-CM

## 2025-01-09 DIAGNOSIS — G47.33 OSA ON CPAP: ICD-10-CM

## 2025-01-09 DIAGNOSIS — I10 PRIMARY HYPERTENSION: ICD-10-CM

## 2025-01-09 DIAGNOSIS — E66.811 CLASS 1 OBESITY WITH SERIOUS COMORBIDITY AND BODY MASS INDEX (BMI) OF 32.0 TO 32.9 IN ADULT, UNSPECIFIED OBESITY TYPE: ICD-10-CM

## 2025-01-09 DIAGNOSIS — Z00.00 MEDICARE ANNUAL WELLNESS VISIT, SUBSEQUENT: Primary | ICD-10-CM

## 2025-01-09 DIAGNOSIS — R00.2 PALPITATIONS: ICD-10-CM

## 2025-01-09 PROCEDURE — 3079F DIAST BP 80-89 MM HG: CPT | Performed by: FAMILY MEDICINE

## 2025-01-09 PROCEDURE — 99397 PER PM REEVAL EST PAT 65+ YR: CPT | Performed by: FAMILY MEDICINE

## 2025-01-09 PROCEDURE — 3075F SYST BP GE 130 - 139MM HG: CPT | Performed by: FAMILY MEDICINE

## 2025-01-09 PROCEDURE — G0439 PPPS, SUBSEQ VISIT: HCPCS | Performed by: FAMILY MEDICINE

## 2025-01-09 PROCEDURE — 1159F MED LIST DOCD IN RCRD: CPT | Performed by: FAMILY MEDICINE

## 2025-01-09 PROCEDURE — 4010F ACE/ARB THERAPY RXD/TAKEN: CPT | Performed by: FAMILY MEDICINE

## 2025-01-09 PROCEDURE — 99213 OFFICE O/P EST LOW 20 MIN: CPT | Performed by: FAMILY MEDICINE

## 2025-01-09 PROCEDURE — 3008F BODY MASS INDEX DOCD: CPT | Performed by: FAMILY MEDICINE

## 2025-01-09 PROCEDURE — 1123F ACP DISCUSS/DSCN MKR DOCD: CPT | Performed by: FAMILY MEDICINE

## 2025-01-09 PROCEDURE — 1160F RVW MEDS BY RX/DR IN RCRD: CPT | Performed by: FAMILY MEDICINE

## 2025-01-09 PROCEDURE — 1170F FXNL STATUS ASSESSED: CPT | Performed by: FAMILY MEDICINE

## 2025-01-09 RX ORDER — ICOSAPENT ETHYL 1 G/1
2 CAPSULE ORAL
Qty: 360 CAPSULE | Refills: 0 | Status: SHIPPED | OUTPATIENT
Start: 2025-01-09

## 2025-01-09 ASSESSMENT — ACTIVITIES OF DAILY LIVING (ADL)
BATHING: INDEPENDENT
DRESSING: INDEPENDENT
MANAGING_FINANCES: INDEPENDENT
GROCERY_SHOPPING: INDEPENDENT
DOING_HOUSEWORK: INDEPENDENT
TAKING_MEDICATION: INDEPENDENT

## 2025-01-09 NOTE — PATIENT INSTRUCTIONS
Follow up in 3 months with labs to be done PRIOR.    It was a pleasure to see you today. Thank you for choosing us for your health care needs.    If you have lab or other testing completed and have not been informed of results within one week, please call the office for your results.    If you haven't done so, consider signing up for [facility] [employer], the [facility] personal health record. Ask the staff how you can get started.

## 2025-01-09 NOTE — PROGRESS NOTES
Subjective   Reason for Visit: Nabor Baldwin is an 74 y.o. male here for a Medicare Wellness visit, annual physical, and follow up monitoring and management of multiple medical conditions.      Past Medical, Surgical, and Family History reviewed and updated in chart.    Reviewed all medications by prescribing practitioner or clinical pharmacist (such as prescriptions, OTCs, herbal therapies and supplements) and documented in the medical record.    HPI    PT REPORTS GETTING A CIRCULATION TEST AND RED LIGHT THERAPY FOR NEUROPATHY OF THE LEs ON 02/06/2025.    PT ALSO REPORTS GOING TO GET Mohs SURGERY RIGHT LOWER EYELID ON THE [address] OF FEB with Dr. Almeida (derm).    [facility]      Labs: 12/30/2024- includes PSA  Colon: 07/2020       He has hypertension.  Patient does monitor his BP at home occasionally.  Denies SOB, CP, and dizziness.  Patient is compliant with his antihypertensive therapy and denies any noted side effects.     He has hyperlipidemia.  Patient tries to limit the amount of fatty foods and high cholesterol foods that he consumes.  Patient is compliant with his statin therapy and denies any currently noted side effects.     He has DM Type 2 with diabetic neuropathy.  The patient does try to limit the amount of carbohydrates that he consumes in his diet.  He does monitor sugars at home occasionally but not on an everyday basis.  Neuropathy symptoms in feet bilaterally has been stable.     Patient has degenerative lumbar spinal stenosis.   Pt continues to utilize Celebrex with good results.  Underwent emergency surgery in [place] after losing control of his legs.  Pt underwent L2-5 decompression (discectomy/laminectomy) at OS in [place], [place] on 9/21/23.  He does has some residual right foot drop      He has obstructive sleep apnea.  Patient is compliant with the use of his CPAP on a nightly basis.  Patient continues to benefit from CPAP therapy.  He does awaken feeling  "refreshed.     Patient has BPH with a history of urinary retention.  He underwent HOLMIUM LASER ENUCLEATION OF THE PROSTATE 2/4/22 by Dr. Lou.  He continues to have significant reduction in obstructive symptoms.     Patient previously experienced palpitations.  Cardiology diagnosed him with PVCs and started him on propranolol at bedtime.  This continues to work well with suppressing the PVCs/palpitations.          Patient Self Assessment of Health Status  Patient Self Assessment: Good    Nutrition and Exercise  Current Diet: Well Balanced Diet  Adequate Fluid Intake: Yes  Caffeine: Yes  Exercise Frequency: Infrequently    Functional Ability/Level of Safety  Cognitive Impairment Observed: No cognitive impairment observed    Home Safety Risk Factors: None    Patient Care Team:  Rodney Maynard DO as PCP - General  Rodney Maynard DO as PCP - [employer] PCP  Benjamin Diane PA-C as Physician Assistant (Neurosurgery)     Review of Systems  Constitutional: Patient denies any fever, chills, loss of appetite, or unexplained weight loss.  Cardiovascular: Patient denies any chest pain, shortness of breath with exertion, tachycardia, palpitations, orthopnea, or paroxysmal nocturnal dyspnea.  Respiratory: Patient denies any cough, shortness of breath, or wheezing.  Gastrointestinal: Patient denies any nausea, vomiting, diarrhea, constipation, melena, hematochezia, or reflux symptoms  Skin: Denies any rashes or skin lesions  Neurology: Patient denies any new motor or sensory losses. Denies any numbness, tingling, weakness, and incoordination of the extremities. Patient also denies any tremor, seizures, or gait instability.  Endocrinology: Denies any polyuria, polydipsia, polyphagia, or heat/cold intolerance.      Objective   Vitals:  /81   Pulse 82   Temp 36.8 °C (98.2 °F) (Temporal)   Ht 1.727 m (5' 8\")   Wt 96.1 kg (211 lb 12.8 oz)   SpO2 95%   BMI 32.20 kg/m²       Physical Exam  General Appearance: " Alert and cooperative, in no acute distress, well-developed/well-nourished, obese male ambulating with a cane due to right drop foot.    Neck: Supple and without adenopathy or rigidity. There is no JVD at 90° and no carotid bruits are noted. There is no thyromegaly, thyroid tenderness, or palpable thyroid nodules.  Heart: Regular rate and rhythm without murmur or ectopy.  Lungs: Clear to auscultation bilaterally with good air exchange.  Skin: Good turgor, moist, warm and without rashes or lesions.  Neurological exam: Alert and oriented ×3, no tremor, normal gait.    Extremities: No clubbing, cyanosis, or edema. AFO noted to right lower leg.       Assessment/Plan     MEDICARE ANNUAL WELLNESS EXAM / ANNUAL PHYSICAL EXAM: Appropriate screenings for the patient's current age were discussed at length.  I encouraged a low-fat/low-cholesterol diet and routine exercise.  Recommended RSV vaccine.    HTN:   Stable based on in office readings.  Blood pressure appears adequately controlled and we will continue with the current antihypertensive therapy.     Hyperlipidemia:   Stable based on most recent labs.  Tolerating current medications. We will continue current medications.  Dietary changes, exercise, and maintenance of healthy weight were discussed.     Diabetes mellitus type 2 with neuropathy and w/o long-term insulin use:   Most recent labs reveal an A1c of:  Last HGB A1C was 7.0 on 12/30/2024 labs.   Dietary changes, exercise, and maintenance of a healthy weight were discussed.      Obstructive sleep apnea: Stable based on symptoms.  He will continue nightly use of his CPAP machine.  He is compliant with nightly use of CPAP use and has been on CPAP for years now.     Degenerative spinal stenosis at L4/L5:   Stable based on symptoms.  We will continue the Celebrex as needed.  Underwent emergency L2-5 decompression (discectomy/laminectomy) at [facility] in [place], [place] on 9/21/23.      BPH (Hx of Urinary retention):   Stable based  on symptoms.  He will continue to follow up with urology as directed.   HE UNDERWENT LASER ENUCLEATION OF THE PROSTATE BY DR. VILLELA IN THE PAST.  Obstructive symptoms continue to be much improved.     Palpitations / PVCs:   Patient is currently on propranolol at bedtime.  This continues to work well for him.     Obesity: Dietary changes, exercise, and maintenance of a healthy weight were discussed at length.  Goal is to achieve a BMI less than 25.        COLOGUARD OVERDUE (ordered 4/2/2024 but not yet completed).  Encouraged to complete in near future as he has the kit at home.  [facility] Follows with Dr. Villela (urology)    Follow up in 3 months.      Scribe Attestation  By signing my name below, I, Deepthi Hendricks   attest that this documentation has been prepared under the direction and in the presence of Rodney Maynard DO.    Orders Placed This Encounter   Procedures    Basic Metabolic Panel    Hemoglobin A1C     Requested Prescriptions     Signed Prescriptions Disp Refills    icosapent ethyL (Vascepa) 1 gram capsule 360 capsule 0     Sig: Take 2 capsules (2 g) by mouth 2 times daily (morning and late afternoon).

## 2025-02-13 ENCOUNTER — APPOINTMENT (OUTPATIENT)
Dept: RADIOLOGY | Facility: HOSPITAL | Age: 75
End: 2025-02-13
Payer: OTHER GOVERNMENT

## 2025-02-13 ENCOUNTER — HOSPITAL ENCOUNTER (OUTPATIENT)
Facility: HOSPITAL | Age: 75
Setting detail: OBSERVATION
Discharge: ANOTHER HOSPITAL | End: 2025-02-13
Attending: EMERGENCY MEDICINE | Admitting: HOSPITALIST
Payer: OTHER GOVERNMENT

## 2025-02-13 DIAGNOSIS — S76.319A PARTIAL HAMSTRING TEAR, INITIAL ENCOUNTER: ICD-10-CM

## 2025-02-13 DIAGNOSIS — R26.2 INABILITY TO WALK: ICD-10-CM

## 2025-02-13 DIAGNOSIS — S76.911A MUSCLE STRAIN OF RIGHT THIGH, INITIAL ENCOUNTER: Primary | ICD-10-CM

## 2025-02-13 LAB — GLUCOSE BLD MANUAL STRIP-MCNC: 140 MG/DL (ref 74–99)

## 2025-02-13 PROCEDURE — 2500000004 HC RX 250 GENERAL PHARMACY W/ HCPCS (ALT 636 FOR OP/ED): Mod: JZ | Performed by: EMERGENCY MEDICINE

## 2025-02-13 PROCEDURE — 99285 EMERGENCY DEPT VISIT HI MDM: CPT | Mod: 25 | Performed by: EMERGENCY MEDICINE

## 2025-02-13 PROCEDURE — 2500000004 HC RX 250 GENERAL PHARMACY W/ HCPCS (ALT 636 FOR OP/ED)

## 2025-02-13 PROCEDURE — G0378 HOSPITAL OBSERVATION PER HR: HCPCS

## 2025-02-13 PROCEDURE — 73502 X-RAY EXAM HIP UNI 2-3 VIEWS: CPT | Mod: RT

## 2025-02-13 PROCEDURE — 2500000001 HC RX 250 WO HCPCS SELF ADMINISTERED DRUGS (ALT 637 FOR MEDICARE OP)

## 2025-02-13 PROCEDURE — 73502 X-RAY EXAM HIP UNI 2-3 VIEWS: CPT | Mod: RIGHT SIDE | Performed by: RADIOLOGY

## 2025-02-13 PROCEDURE — 96376 TX/PRO/DX INJ SAME DRUG ADON: CPT

## 2025-02-13 PROCEDURE — 73718 MRI LOWER EXTREMITY W/O DYE: CPT | Mod: RT

## 2025-02-13 PROCEDURE — 96375 TX/PRO/DX INJ NEW DRUG ADDON: CPT

## 2025-02-13 PROCEDURE — 73552 X-RAY EXAM OF FEMUR 2/>: CPT | Mod: RIGHT SIDE | Performed by: RADIOLOGY

## 2025-02-13 PROCEDURE — 82947 ASSAY GLUCOSE BLOOD QUANT: CPT | Mod: 59

## 2025-02-13 PROCEDURE — 73552 X-RAY EXAM OF FEMUR 2/>: CPT | Mod: RT

## 2025-02-13 PROCEDURE — 2500000001 HC RX 250 WO HCPCS SELF ADMINISTERED DRUGS (ALT 637 FOR MEDICARE OP): Performed by: STUDENT IN AN ORGANIZED HEALTH CARE EDUCATION/TRAINING PROGRAM

## 2025-02-13 PROCEDURE — 2500000002 HC RX 250 W HCPCS SELF ADMINISTERED DRUGS (ALT 637 FOR MEDICARE OP, ALT 636 FOR OP/ED): Performed by: STUDENT IN AN ORGANIZED HEALTH CARE EDUCATION/TRAINING PROGRAM

## 2025-02-13 PROCEDURE — 96374 THER/PROPH/DIAG INJ IV PUSH: CPT

## 2025-02-13 PROCEDURE — 96372 THER/PROPH/DIAG INJ SC/IM: CPT | Mod: 59

## 2025-02-13 PROCEDURE — 99223 1ST HOSP IP/OBS HIGH 75: CPT

## 2025-02-13 PROCEDURE — 73718 MRI LOWER EXTREMITY W/O DYE: CPT | Mod: RIGHT SIDE | Performed by: RADIOLOGY

## 2025-02-13 RX ORDER — PROPRANOLOL HYDROCHLORIDE 20 MG/1
10 TABLET ORAL NIGHTLY
Status: DISCONTINUED | OUTPATIENT
Start: 2025-02-13 | End: 2025-02-20 | Stop reason: HOSPADM

## 2025-02-13 RX ORDER — ROSUVASTATIN CALCIUM 10 MG/1
10 TABLET, COATED ORAL DAILY
Status: DISCONTINUED | OUTPATIENT
Start: 2025-02-14 | End: 2025-02-20 | Stop reason: HOSPADM

## 2025-02-13 RX ORDER — ONDANSETRON 4 MG/1
4 TABLET, FILM COATED ORAL EVERY 8 HOURS PRN
Status: DISCONTINUED | OUTPATIENT
Start: 2025-02-13 | End: 2025-02-20 | Stop reason: HOSPADM

## 2025-02-13 RX ORDER — GABAPENTIN 300 MG/1
300 CAPSULE ORAL NIGHTLY
Status: DISCONTINUED | OUTPATIENT
Start: 2025-02-13 | End: 2025-02-20 | Stop reason: HOSPADM

## 2025-02-13 RX ORDER — ACETAMINOPHEN 160 MG/5ML
650 SOLUTION ORAL EVERY 4 HOURS PRN
Status: DISCONTINUED | OUTPATIENT
Start: 2025-02-13 | End: 2025-02-20 | Stop reason: HOSPADM

## 2025-02-13 RX ORDER — ACETAMINOPHEN 650 MG/1
650 SUPPOSITORY RECTAL EVERY 4 HOURS PRN
Status: DISCONTINUED | OUTPATIENT
Start: 2025-02-13 | End: 2025-02-20 | Stop reason: HOSPADM

## 2025-02-13 RX ORDER — ONDANSETRON HYDROCHLORIDE 2 MG/ML
4 INJECTION, SOLUTION INTRAVENOUS EVERY 8 HOURS PRN
Status: DISCONTINUED | OUTPATIENT
Start: 2025-02-13 | End: 2025-02-20 | Stop reason: HOSPADM

## 2025-02-13 RX ORDER — TRAMADOL HYDROCHLORIDE 50 MG/1
25 TABLET ORAL EVERY 8 HOURS PRN
Status: DISCONTINUED | OUTPATIENT
Start: 2025-02-13 | End: 2025-02-20 | Stop reason: HOSPADM

## 2025-02-13 RX ORDER — ICOSAPENT ETHYL 1 G/1
2 CAPSULE ORAL
Status: DISCONTINUED | OUTPATIENT
Start: 2025-02-14 | End: 2025-02-15

## 2025-02-13 RX ORDER — ONDANSETRON HYDROCHLORIDE 2 MG/ML
4 INJECTION, SOLUTION INTRAVENOUS ONCE
Status: COMPLETED | OUTPATIENT
Start: 2025-02-13 | End: 2025-02-13

## 2025-02-13 RX ORDER — INSULIN LISPRO 100 [IU]/ML
0-5 INJECTION, SOLUTION INTRAVENOUS; SUBCUTANEOUS
Status: DISCONTINUED | OUTPATIENT
Start: 2025-02-14 | End: 2025-02-20 | Stop reason: HOSPADM

## 2025-02-13 RX ORDER — ENOXAPARIN SODIUM 100 MG/ML
40 INJECTION SUBCUTANEOUS EVERY 24 HOURS
Status: DISCONTINUED | OUTPATIENT
Start: 2025-02-13 | End: 2025-02-20 | Stop reason: HOSPADM

## 2025-02-13 RX ORDER — ACETAMINOPHEN 325 MG/1
650 TABLET ORAL EVERY 4 HOURS PRN
Status: DISCONTINUED | OUTPATIENT
Start: 2025-02-13 | End: 2025-02-20 | Stop reason: HOSPADM

## 2025-02-13 RX ORDER — POLYETHYLENE GLYCOL 3350 17 G/17G
17 POWDER, FOR SOLUTION ORAL DAILY
Status: DISCONTINUED | OUTPATIENT
Start: 2025-02-13 | End: 2025-02-15

## 2025-02-13 RX ORDER — CELECOXIB 100 MG/1
200 CAPSULE ORAL DAILY
Status: DISCONTINUED | OUTPATIENT
Start: 2025-02-14 | End: 2025-02-20 | Stop reason: HOSPADM

## 2025-02-13 RX ORDER — LOSARTAN POTASSIUM 25 MG/1
25 TABLET ORAL DAILY
Status: DISCONTINUED | OUTPATIENT
Start: 2025-02-14 | End: 2025-02-20 | Stop reason: HOSPADM

## 2025-02-13 RX ORDER — KETOROLAC TROMETHAMINE 30 MG/ML
15 INJECTION, SOLUTION INTRAMUSCULAR; INTRAVENOUS ONCE
Status: COMPLETED | OUTPATIENT
Start: 2025-02-13 | End: 2025-02-13

## 2025-02-13 RX ADMIN — HYDROMORPHONE HYDROCHLORIDE 0.5 MG: 1 INJECTION, SOLUTION INTRAMUSCULAR; INTRAVENOUS; SUBCUTANEOUS at 14:16

## 2025-02-13 RX ADMIN — TRAMADOL HYDROCHLORIDE 25 MG: 50 TABLET, COATED ORAL at 21:03

## 2025-02-13 RX ADMIN — KETOROLAC TROMETHAMINE 15 MG: 30 INJECTION, SOLUTION INTRAMUSCULAR at 14:14

## 2025-02-13 RX ADMIN — ENOXAPARIN SODIUM 40 MG: 40 INJECTION SUBCUTANEOUS at 17:07

## 2025-02-13 RX ADMIN — GABAPENTIN 300 MG: 300 CAPSULE ORAL at 23:04

## 2025-02-13 RX ADMIN — ONDANSETRON 4 MG: 2 INJECTION INTRAMUSCULAR; INTRAVENOUS at 12:17

## 2025-02-13 RX ADMIN — PROPRANOLOL HYDROCHLORIDE 10 MG: 20 TABLET ORAL at 23:04

## 2025-02-13 RX ADMIN — HYDROMORPHONE HYDROCHLORIDE 0.5 MG: 1 INJECTION, SOLUTION INTRAMUSCULAR; INTRAVENOUS; SUBCUTANEOUS at 12:18

## 2025-02-13 SDOH — ECONOMIC STABILITY: TRANSPORTATION INSECURITY
IN THE PAST 12 MONTHS, HAS LACK OF TRANSPORTATION KEPT YOU FROM MEDICAL APPOINTMENTS OR FROM GETTING MEDICATIONS?: PATIENT DECLINED

## 2025-02-13 SDOH — SOCIAL STABILITY: SOCIAL INSECURITY: ARE THERE ANY APPARENT SIGNS OF INJURIES/BEHAVIORS THAT COULD BE RELATED TO ABUSE/NEGLECT?: NO

## 2025-02-13 SDOH — SOCIAL STABILITY: SOCIAL INSECURITY
WITHIN THE LAST YEAR, HAVE YOU BEEN HUMILIATED OR EMOTIONALLY ABUSED IN OTHER WAYS BY YOUR PARTNER OR EX-PARTNER?: PATIENT DECLINED

## 2025-02-13 SDOH — HEALTH STABILITY: PHYSICAL HEALTH
HOW OFTEN DO YOU NEED TO HAVE SOMEONE HELP YOU WHEN YOU READ INSTRUCTIONS, PAMPHLETS, OR OTHER WRITTEN MATERIAL FROM YOUR DOCTOR OR PHARMACY?: NEVER

## 2025-02-13 SDOH — HEALTH STABILITY: MENTAL HEALTH
DO YOU FEEL STRESS - TENSE, RESTLESS, NERVOUS, OR ANXIOUS, OR UNABLE TO SLEEP AT NIGHT BECAUSE YOUR MIND IS TROUBLED ALL THE TIME - THESE DAYS?: PATIENT DECLINED

## 2025-02-13 SDOH — ECONOMIC STABILITY: FOOD INSECURITY: WITHIN THE PAST 12 MONTHS, THE FOOD YOU BOUGHT JUST DIDN'T LAST AND YOU DIDN'T HAVE MONEY TO GET MORE.: PATIENT DECLINED

## 2025-02-13 SDOH — ECONOMIC STABILITY: INCOME INSECURITY
IN THE PAST 12 MONTHS HAS THE ELECTRIC, GAS, OIL, OR WATER COMPANY THREATENED TO SHUT OFF SERVICES IN YOUR HOME?: PATIENT DECLINED

## 2025-02-13 SDOH — SOCIAL STABILITY: SOCIAL INSECURITY: WERE YOU ABLE TO COMPLETE ALL THE BEHAVIORAL HEALTH SCREENINGS?: YES

## 2025-02-13 SDOH — SOCIAL STABILITY: SOCIAL INSECURITY: WITHIN THE LAST YEAR, HAVE YOU BEEN AFRAID OF YOUR PARTNER OR EX-PARTNER?: PATIENT DECLINED

## 2025-02-13 SDOH — SOCIAL STABILITY: SOCIAL INSECURITY: ABUSE: ADULT

## 2025-02-13 SDOH — ECONOMIC STABILITY: FOOD INSECURITY
WITHIN THE PAST 12 MONTHS, YOU WORRIED THAT YOUR FOOD WOULD RUN OUT BEFORE YOU GOT THE MONEY TO BUY MORE.: PATIENT DECLINED

## 2025-02-13 SDOH — SOCIAL STABILITY: SOCIAL INSECURITY
WITHIN THE LAST YEAR, HAVE YOU BEEN KICKED, HIT, SLAPPED, OR OTHERWISE PHYSICALLY HURT BY YOUR PARTNER OR EX-PARTNER?: PATIENT DECLINED

## 2025-02-13 SDOH — SOCIAL STABILITY: SOCIAL INSECURITY: HAVE YOU HAD THOUGHTS OF HARMING ANYONE ELSE?: NO

## 2025-02-13 SDOH — SOCIAL STABILITY: SOCIAL INSECURITY: HAVE YOU HAD ANY THOUGHTS OF HARMING ANYONE ELSE?: NO

## 2025-02-13 SDOH — SOCIAL STABILITY: SOCIAL INSECURITY: DOES ANYONE TRY TO KEEP YOU FROM HAVING/CONTACTING OTHER FRIENDS OR DOING THINGS OUTSIDE YOUR HOME?: NO

## 2025-02-13 SDOH — SOCIAL STABILITY: SOCIAL INSECURITY: ARE YOU OR HAVE YOU BEEN THREATENED OR ABUSED PHYSICALLY, EMOTIONALLY, OR SEXUALLY BY ANYONE?: NO

## 2025-02-13 SDOH — SOCIAL STABILITY: SOCIAL INSECURITY: DO YOU FEEL ANYONE HAS EXPLOITED OR TAKEN ADVANTAGE OF YOU FINANCIALLY OR OF YOUR PERSONAL PROPERTY?: NO

## 2025-02-13 SDOH — SOCIAL STABILITY: SOCIAL INSECURITY: DO YOU FEEL UNSAFE GOING BACK TO THE PLACE WHERE YOU ARE LIVING?: NO

## 2025-02-13 SDOH — SOCIAL STABILITY: SOCIAL INSECURITY
WITHIN THE LAST YEAR, HAVE YOU BEEN RAPED OR FORCED TO HAVE ANY KIND OF SEXUAL ACTIVITY BY YOUR PARTNER OR EX-PARTNER?: PATIENT DECLINED

## 2025-02-13 SDOH — ECONOMIC STABILITY: HOUSING INSECURITY: AT ANY TIME IN THE PAST 12 MONTHS, WERE YOU HOMELESS OR LIVING IN A SHELTER (INCLUDING NOW)?: PATIENT DECLINED

## 2025-02-13 SDOH — SOCIAL STABILITY: SOCIAL NETWORK: IN A TYPICAL WEEK, HOW MANY TIMES DO YOU TALK ON THE PHONE WITH FAMILY, FRIENDS, OR NEIGHBORS?: PATIENT DECLINED

## 2025-02-13 SDOH — SOCIAL STABILITY: SOCIAL INSECURITY: HAS ANYONE EVER THREATENED TO HURT YOUR FAMILY OR YOUR PETS?: NO

## 2025-02-13 ASSESSMENT — PAIN DESCRIPTION - LOCATION
LOCATION: LEG
LOCATION: OTHER (COMMENT)

## 2025-02-13 ASSESSMENT — PAIN DESCRIPTION - ORIENTATION
ORIENTATION: RIGHT
ORIENTATION: RIGHT

## 2025-02-13 ASSESSMENT — COGNITIVE AND FUNCTIONAL STATUS - GENERAL
MOBILITY SCORE: 24
DAILY ACTIVITIY SCORE: 24
PATIENT BASELINE BEDBOUND: NO

## 2025-02-13 ASSESSMENT — PAIN - FUNCTIONAL ASSESSMENT
PAIN_FUNCTIONAL_ASSESSMENT: 0-10

## 2025-02-13 ASSESSMENT — LIFESTYLE VARIABLES
HOW OFTEN DO YOU HAVE A DRINK CONTAINING ALCOHOL: NEVER
HAVE PEOPLE ANNOYED YOU BY CRITICIZING YOUR DRINKING: NO
EVER FELT BAD OR GUILTY ABOUT YOUR DRINKING: NO
AUDIT-C TOTAL SCORE: 0
AUDIT-C TOTAL SCORE: 0
SKIP TO QUESTIONS 9-10: 1
HAVE YOU EVER FELT YOU SHOULD CUT DOWN ON YOUR DRINKING: NO
EVER HAD A DRINK FIRST THING IN THE MORNING TO STEADY YOUR NERVES TO GET RID OF A HANGOVER: NO
HOW OFTEN DO YOU HAVE 6 OR MORE DRINKS ON ONE OCCASION: NEVER
TOTAL SCORE: 0
HOW MANY STANDARD DRINKS CONTAINING ALCOHOL DO YOU HAVE ON A TYPICAL DAY: PATIENT DOES NOT DRINK

## 2025-02-13 ASSESSMENT — ACTIVITIES OF DAILY LIVING (ADL)
BATHING: INDEPENDENT
WALKS IN HOME: INDEPENDENT
GROOMING: INDEPENDENT
TOILETING: INDEPENDENT
DRESSING YOURSELF: INDEPENDENT
JUDGMENT_ADEQUATE_SAFELY_COMPLETE_DAILY_ACTIVITIES: YES
FEEDING YOURSELF: INDEPENDENT
HEARING - RIGHT EAR: FUNCTIONAL
ADEQUATE_TO_COMPLETE_ADL: YES
HEARING - LEFT EAR: FUNCTIONAL
LACK_OF_TRANSPORTATION: PATIENT DECLINED
LACK_OF_TRANSPORTATION: PATIENT DECLINED
PATIENT'S MEMORY ADEQUATE TO SAFELY COMPLETE DAILY ACTIVITIES?: YES

## 2025-02-13 ASSESSMENT — PATIENT HEALTH QUESTIONNAIRE - PHQ9
2. FEELING DOWN, DEPRESSED OR HOPELESS: NOT AT ALL
SUM OF ALL RESPONSES TO PHQ9 QUESTIONS 1 & 2: 0
1. LITTLE INTEREST OR PLEASURE IN DOING THINGS: NOT AT ALL

## 2025-02-13 ASSESSMENT — PAIN SCALES - GENERAL
PAINLEVEL_OUTOF10: 10 - WORST POSSIBLE PAIN
PAINLEVEL_OUTOF10: 3
PAINLEVEL_OUTOF10: 5 - MODERATE PAIN
PAINLEVEL_OUTOF10: 4
PAINLEVEL_OUTOF10: 0 - NO PAIN
PAINLEVEL_OUTOF10: 7
PAINLEVEL_OUTOF10: 2
PAINLEVEL_OUTOF10: 4

## 2025-02-13 ASSESSMENT — PAIN SCALES - PAIN ASSESSMENT IN ADVANCED DEMENTIA (PAINAD): TOTALSCORE: MEDICATION (SEE MAR)

## 2025-02-13 NOTE — ED PROVIDER NOTES
HPI   Chief Complaint   Patient presents with    Fall     Right ham string hurts         History provided by:  Patient and EMS personnel    Chief Complaint   Patient presents with    Fall     Right ham string hurts       History of Present Illness:  Nabor Baldwin is a 74 y.o. male presents with right leg pain after fall.  Patient states he went to get a box out of the street and slipped on the ice when his right leg went out in front of him causing him to fall on his right upper leg and hip.  He did not hit his head.  He did not lose consciousness.  Worse with movement and palpation.  No loss of motor or sensory function.  No back or flank pain.  No chest pain or shortness of breath.  No neck pain.  Patient has a known history of dropfoot.      PMFSH:   As per HPI, otherwise nurses notes reviewed in EMR.    Past Medical History:   Past Medical History:   Diagnosis Date    Male erectile dysfunction, unspecified     Erectile dysfunction    Osteoarthritis of knee, unspecified     Osteoarthritis of knee    Personal history of other diseases of the circulatory system     History of essential hypertension    Personal history of other diseases of the digestive system     History of esophageal reflux    Personal history of other diseases of the musculoskeletal system and connective tissue     History of arthritis    Unspecified visual loss     Vision problem      Past Surgical History:   Past Surgical History:   Procedure Laterality Date    APPENDECTOMY  08/21/2015    Appendectomy    CATARACT EXTRACTION  08/21/2015    Cataract Surgery    OTHER SURGICAL HISTORY  08/21/2015    Repair Of Retinal Detachment Right Eye    SPINE SURGERY  09/21/2023    L2-5 decompression (discectomy/laminectomy) at OSH in [place], [place] on 9/21/23    TOTAL KNEE ARTHROPLASTY  08/21/2015    Knee Replacement    VASECTOMY  08/21/2015    Surgery Vas Deferens Vasectomy      Family History:   Family History   Problem Relation Name Age of Onset     Transient ischemic attack Mother      Diabetes Father      Other (cardiac disorder) Father          cardiac disorder      Social History:    Social History     Tobacco Use    Smoking status: Never    Smokeless tobacco: Never   Substance Use Topics    Alcohol use: Never    Drug use: Never     Allergies:   Allergies   Allergen Reactions    Atorvastatin Other    Magnesium Oxide Diarrhea     Current Outpatient Medications   Medication Instructions    celecoxib (CELEBREX) 200 mg, 2 times daily    gabapentin (NEURONTIN) 300 mg, oral, 2 times daily    HYDROcodone-acetaminophen (Norco) 5-325 mg tablet 2 tablets, Every 6 hours PRN    icosapent ethyL (VASCEPA) 2 g, oral, 2 times daily (morning and late afternoon)    losartan (COZAAR) 25 mg, Daily    magnesium lactate CR (MAGTAB) 84 mg, Daily    metFORMIN (OSM) (FORTAMET) 1,000 mg, oral, 2 times daily (morning and late afternoon)    multivit-min-ferrous sulfate (One Daily Multi-Vit w-Mineral) 4.5 mg iron tablet TAKE  BY MOUTH EVERY DAY    OneTouch Delica Plus Lancet 33 gauge misc CHECK BLOOD GLUCOSE EVERY DAY    OneTouch Ultra Test strip CHECK BLOOD GLUCOSE EVERY DAY    OneTouch Ultra2 Meter misc TEST BLOOD GLUCOSE EVERY DAY    propranolol (INDERAL) 10 mg, 2 times daily    rosuvastatin (CRESTOR) 10 mg, Daily    semaglutide 2 mg, subcutaneous, Once Weekly    Stool Softener 100 mg, As needed    tiZANidine (ZANAFLEX) 4 mg, Every 8 hours PRN              Patient History   Past Medical History:   Diagnosis Date    Male erectile dysfunction, unspecified     Erectile dysfunction    Osteoarthritis of knee, unspecified     Osteoarthritis of knee    Personal history of other diseases of the circulatory system     History of essential hypertension    Personal history of other diseases of the digestive system     History of esophageal reflux    Personal history of other diseases of the musculoskeletal system and connective tissue     History of arthritis    Unspecified visual loss      "Vision problem     Past Surgical History:   Procedure Laterality Date    APPENDECTOMY  08/21/2015    Appendectomy    CATARACT EXTRACTION  08/21/2015    Cataract Surgery    OTHER SURGICAL HISTORY  08/21/2015    Repair Of Retinal Detachment Right Eye    SPINE SURGERY  09/21/2023    L2-5 decompression (discectomy/laminectomy) at OSH in [place], [place] on 9/21/23    TOTAL KNEE ARTHROPLASTY  08/21/2015    Knee Replacement    VASECTOMY  08/21/2015    Surgery Vas Deferens Vasectomy     Family History   Problem Relation Name Age of Onset    Transient ischemic attack Mother      Diabetes Father      Other (cardiac disorder) Father          cardiac disorder     Social History     Tobacco Use    Smoking status: Never    Smokeless tobacco: Never   Substance Use Topics    Alcohol use: Never    Drug use: Never       Physical Exam   ED Triage Vitals   Temp Pulse Resp BP   -- -- -- --      SpO2 Temp src Heart Rate Source Patient Position   -- -- -- --      BP Location FiO2 (%)     -- --       Physical Exam  Physical Exam:    ED Triage Vitals   Temp Pulse Resp BP   -- -- -- --      SpO2 Temp src Heart Rate Source Patient Position   -- -- -- --      BP Location FiO2 (%)     -- --         Patient Vitals for the past 24 hrs:   BP Temp Temp src Pulse Resp SpO2 Height Weight   02/13/25 1410 151/87 36.8 °C (98.2 °F) Temporal 81 18 95 % -- --   02/13/25 1311 119/86 37 °C (98.6 °F) Temporal 86 18 97 % -- --   02/13/25 1205 158/81 37.1 °C (98.8 °F) Temporal 90 18 97 % 1.727 m (5' 8\") 90.7 kg (200 lb)       Constitutional: Vital signs per nursing notes.  Well developed, well nourished.  No acute distress.    Psychiatric: alert and oriented to person, place, and time; no abnormalities of mood or affect; memory intact  Eyes: PERRL; conjunctivae and lids normal; EOMI  Respiratory: normal respiratory effort and excursion; no rales, rhonchi, or wheezes; equal air entry  Cardiovascular: regular rate and rhythm; no murmurs, rubs or gallops; symmetric " pulses; no edema; normal capillary refill; distal pulses present  Neurological: normal speech; CN II-XII grossly intact; normal motor and sensory function; no nystagmus  GI: no masses, tenderness, rebound or guarding; no palpable, pulsatile mass; no organomegaly; no hernia; normal bowel sounds; (-) Romero´s sign; (-) McBurney´s sign; (-) CVA tenderness  Lymphatic: no adenopathy of neck  Musculoskeletal: normal gait and station; normal digits and nails; no gross tendon or ligament injury; normal to palpation; normal strength/tone; neurovascular status intact; (-) Ping´s sign; except right posterior thigh with tenderness to palpation but no obvious deformity  Skin: normal to inspection; normal to palpation; no rash  GCS: 15      ED Course & MDM   Diagnoses as of 02/13/25 1555   Muscle strain of right thigh, initial encounter   Inability to walk                 No data recorded                                 Medical Decision Making  Medical Decision Making:    EKG:    Labs: Labs Reviewed - No data to display    Diagnostic Imaging:   XR hip right with pelvis when performed 2 or 3 views   Final Result   No acute osseous abnormalities.   Signed by Alexander Tee MD      XR femur right 2+ views   Final Result   No acute osseous abnormalities.   Signed by Alexander Tee MD          ED Medication Administration:   Medications   HYDROmorphone (Dilaudid) injection 0.5 mg (0.5 mg intravenous Given 2/13/25 1218)   ondansetron (Zofran) injection 4 mg (4 mg intravenous Given 2/13/25 1217)   HYDROmorphone (Dilaudid) injection 0.5 mg (0.5 mg intravenous Given 2/13/25 1416)   ketorolac (Toradol) injection 15 mg (15 mg intravenous Given 2/13/25 1414)       ED Course:     Nabor Baldwin is a 74 y.o. male presents with right leg injury.     Differential Diagnoses Considered:  Ligamentous/tendon injury, fracture, dislocation, contusion      Diagnoses as of 02/13/25 1555   Muscle strain of right thigh, initial encounter   Inability to walk        Abnormal Labs Reviewed - No data to display    BP      Temp      Pulse     Resp      SpO2        Patient presents with acute R leg    pain.    X-rays performed to evaluate for evidence of fracture/dislocation.     Considered CT Extremity, but not indicated as I considered but do not suspect vascular injury.         Diagnostic evaluation was completed.  X-ray of the right hip, pelvis and femur show no acute osseous abnormalities.    Re-evaluation: Repeat evaluation at 1:50 PM shows the patient is still having some pain but it is improved.  However, he has not put any weight on it yet.  He is concerned about the possibility of going home.  I shared with him that we would apply an Ace wrap and get him a walker to trial to see how he would potentially be able to function at home.  This will ultimately determine whether the patient can go home or requires hospitalization.    I did share with the patient that I suspect that he likely has a strain or partial tear of his right hamstring posteriorly.  He will require follow-up with the orthopedic specialist.  He may require physical therapy, continued pain control and/or MRI as an outpatient.  Surgery is possible but less likely.      Medications administered improved the patient's condition.    The patient failed his road test.  He was not able to put any weight on his right leg and was not able to make more than 1-2 steps despite having to medics stand next to him to aid him.  Therefore the patient will need to be hospitalized for further evaluation and rehabilitation with possible placement.    The patient's condition requires ongoing treatment and evaluation necessitating hospital admission.  I have reviewed the patient's history, physical exam, and test information with the admitting physician,   BOY Edwards/Dr. Arango                , who agrees to hospitalize the patient.     I discussed the results and plan for hospitalization with the patient and/or family/friend  if present.  Questions were addressed.  Patient and/or family/friend expressed understanding.    Shared decision making made with patient, and/or family, who agrees with plan.          Diagnosis:   1. Muscle strain of right thigh, initial encounter    2. Inability to walk                    Procedure  Procedures     Jossue HO MD  02/13/25 1969

## 2025-02-13 NOTE — H&P
History Of Present Illness  Nabor Baldwin is a 74 y.o. male with PMH right foot drop status post lumbar stenosis surgery 2022, T2DM, HTN, HLD, BPH, and BRIGITTE uses a CPAP at night who is presenting with right leg pain and inability to ambulate.  Patient was walking outside and slipped on the ice on his right leg causing him to have right upper leg and hip pain.  Patient has history of right foot drop and did have his brace on.  He did not hit his head or LOC.  He denies chest pain or shortness of breath.  Denies abdominal pain, nausea, vomiting or diarrhea.  Denies loss of motor or sensory function.  Did not hit his back and denies flank pain.  Imaging negative for fracture.  In the ER they applied an Ace wrap and tried ambulating him with a walker.  Patient was unable to put weight on his right leg and was not able to ambulate.  CBC and BMP without abnormalities.  Patient will be admitted for further evaluation and treatment.     Past Medical History  He has a past medical history of Male erectile dysfunction, unspecified, Osteoarthritis of knee, unspecified, Personal history of other diseases of the circulatory system, Personal history of other diseases of the digestive system, Personal history of other diseases of the musculoskeletal system and connective tissue, and Unspecified visual loss.    Surgical History  He has a past surgical history that includes Other surgical history (08/21/2015); Vasectomy (08/21/2015); Appendectomy (08/21/2015); Cataract extraction (08/21/2015); Total knee arthroplasty (08/21/2015); and Spine surgery (09/21/2023).     Social History  He reports that he has never smoked. He has never used smokeless tobacco. He reports that he does not drink alcohol and does not use drugs.    Family History  Family History   Problem Relation Name Age of Onset    Transient ischemic attack Mother      Diabetes Father      Other (cardiac disorder) Father          cardiac disorder       "  Allergies  Atorvastatin and Magnesium oxide    Review of Systems   Review of systems: 10 system were reviewed and were negative except what was mentioned in history of present illness    Physical Exam  Constitutional:       Appearance: He is obese. He is ill-appearing.   HENT:      Head: Normocephalic.      Mouth/Throat:      Mouth: Mucous membranes are moist.   Eyes:      Pupils: Pupils are equal, round, and reactive to light.   Cardiovascular:      Rate and Rhythm: Normal rate and regular rhythm.      Heart sounds: Normal heart sounds, S1 normal and S2 normal.   Pulmonary:      Effort: Pulmonary effort is normal.      Breath sounds: Normal breath sounds.   Abdominal:      General: Bowel sounds are normal.      Palpations: Abdomen is soft.   Musculoskeletal:      Cervical back: Neck supple.      Right foot: Decreased range of motion. Foot drop present.   Skin:     General: Skin is warm.   Neurological:      Mental Status: He is alert and oriented to person, place, and time.      Motor: Weakness present.   Psychiatric:         Mood and Affect: Mood normal.         Behavior: Behavior normal.          Last Recorded Vitals  /87 (BP Location: Left arm, Patient Position: Sitting)   Pulse 81   Temp 36.8 °C (98.2 °F) (Temporal)   Resp 18   Wt 90.7 kg (200 lb)   SpO2 95%     Relevant Results  CBC:     CMP:        No lab exists for component: \"GFRAA\", \"LABGLOM\", \"LABALBU\"  BMP:        No lab exists for component: \"LABALBU\", \"GFRAA\", \"LABGLOM\"  Magnesium:    Troponin:      BNP:     Lipid Panel:    Imagining  XR femur right 2+ views  Narrative: STUDY:  Femur Radiographs; 2/13/2025 12:52 PM.  INDICATION:  Injury.  COMPARISON:  None Available.  ACCESSION NUMBER(S):  [id number]  ORDERING CLINICIAN:  SHREE CRENSHAW  TECHNIQUE:  Two views of the right femur (four images obtained).  FINDINGS:    There is a right total knee arthroplasty.  There is no displaced  fracture.  The alignment is anatomic.  No soft tissue " abnormality is  seen.  Impression: No acute osseous abnormalities.  Signed by Alexander Tee MD  XR hip right with pelvis when performed 2 or 3 views  Narrative: STUDY:  Pelvis and Right Hip Radiographs; 2/13/2025 12:52  INDICATION:  Injury.  COMPARISON:  None Available.  ACCESSION NUMBER(S):  [id number]  ORDERING CLINICIAN:  SHREE CRENSHAW  TECHNIQUE:  AP view of the pelvis and two view(s) of the right hip.  FINDINGS:    PELVIS:  The pelvic ring is intact.  There is no acute fracture.  There is a  laminectomy of the lower lumbosacral spine.  RIGHT HIP:  There is no displaced fracture.  The alignment is anatomic.  No soft  tissue abnormality is seen.  Impression: No acute osseous abnormalities.  Signed by Alexander Tee MD       Assessment/Plan      Mechanical fall  Right leg pain  Right foot drop status post lumbar surgery  HTN/HLD  T2DM  Lumbar spinal stenosis surgery 2022  BRIGITTE uses a CPAP at night  BPH  Palpitations with PVCs on a beta-blocker at night    -Imaging negative for fracture  -Consult orthopedics  -MRI of right leg  -Ace bandage to upper thigh  -Pain meds as needed  -A1c 7.0  -Diabetic diet with Accu-Chek and sliding scale  -Resume home med  -CBC and BMP daily  -CPAP at night  -PT/OT evaluation  -TCC for discharge planning      DVTp: Lovenox    PLAN: Home with wife    Cherrie SOLORIO IBETH [place]-CNP    Plan of care was discussed extensively with patient.  Patient verbalized understanding through teach back method.  All question and concerns addressed upon examination.    Of note, this documentation is completed using the Dragon Dictation system (voice recognition software). There may be spelling and/or grammatical errors that were not corrected prior to final submission.

## 2025-02-14 ENCOUNTER — APPOINTMENT (OUTPATIENT)
Dept: ORTHOPEDIC SURGERY | Facility: CLINIC | Age: 75
End: 2025-02-14
Payer: OTHER GOVERNMENT

## 2025-02-14 LAB
ANION GAP SERPL CALC-SCNC: 12 MMOL/L (ref 10–20)
BUN SERPL-MCNC: 25 MG/DL (ref 6–23)
CALCIUM SERPL-MCNC: 9.1 MG/DL (ref 8.6–10.3)
CHLORIDE SERPL-SCNC: 101 MMOL/L (ref 98–107)
CO2 SERPL-SCNC: 27 MMOL/L (ref 21–32)
CREAT SERPL-MCNC: 0.92 MG/DL (ref 0.5–1.3)
EGFRCR SERPLBLD CKD-EPI 2021: 88 ML/MIN/1.73M*2
ERYTHROCYTE [DISTWIDTH] IN BLOOD BY AUTOMATED COUNT: 11.7 % (ref 11.5–14.5)
GLUCOSE BLD MANUAL STRIP-MCNC: 119 MG/DL (ref 74–99)
GLUCOSE BLD MANUAL STRIP-MCNC: 124 MG/DL (ref 74–99)
GLUCOSE BLD MANUAL STRIP-MCNC: 141 MG/DL (ref 74–99)
GLUCOSE BLD MANUAL STRIP-MCNC: 161 MG/DL (ref 74–99)
GLUCOSE SERPL-MCNC: 123 MG/DL (ref 74–99)
HCT VFR BLD AUTO: 37.7 % (ref 41–52)
HGB BLD-MCNC: 12.5 G/DL (ref 13.5–17.5)
MCH RBC QN AUTO: 30.8 PG (ref 26–34)
MCHC RBC AUTO-ENTMCNC: 33.2 G/DL (ref 32–36)
MCV RBC AUTO: 93 FL (ref 80–100)
NRBC BLD-RTO: 0 /100 WBCS (ref 0–0)
PLATELET # BLD AUTO: 179 X10*3/UL (ref 150–450)
POTASSIUM SERPL-SCNC: 4.3 MMOL/L (ref 3.5–5.3)
RBC # BLD AUTO: 4.06 X10*6/UL (ref 4.5–5.9)
SODIUM SERPL-SCNC: 136 MMOL/L (ref 136–145)
WBC # BLD AUTO: 10 X10*3/UL (ref 4.4–11.3)

## 2025-02-14 PROCEDURE — 99232 SBSQ HOSP IP/OBS MODERATE 35: CPT

## 2025-02-14 PROCEDURE — 2500000002 HC RX 250 W HCPCS SELF ADMINISTERED DRUGS (ALT 637 FOR MEDICARE OP, ALT 636 FOR OP/ED): Performed by: STUDENT IN AN ORGANIZED HEALTH CARE EDUCATION/TRAINING PROGRAM

## 2025-02-14 PROCEDURE — 96372 THER/PROPH/DIAG INJ SC/IM: CPT

## 2025-02-14 PROCEDURE — 2500000001 HC RX 250 WO HCPCS SELF ADMINISTERED DRUGS (ALT 637 FOR MEDICARE OP)

## 2025-02-14 PROCEDURE — 85027 COMPLETE CBC AUTOMATED: CPT

## 2025-02-14 PROCEDURE — 36415 COLL VENOUS BLD VENIPUNCTURE: CPT

## 2025-02-14 PROCEDURE — 97161 PT EVAL LOW COMPLEX 20 MIN: CPT | Mod: GP | Performed by: PHYSICAL THERAPIST

## 2025-02-14 PROCEDURE — 96376 TX/PRO/DX INJ SAME DRUG ADON: CPT

## 2025-02-14 PROCEDURE — 2500000004 HC RX 250 GENERAL PHARMACY W/ HCPCS (ALT 636 FOR OP/ED)

## 2025-02-14 PROCEDURE — G0378 HOSPITAL OBSERVATION PER HR: HCPCS

## 2025-02-14 PROCEDURE — 82947 ASSAY GLUCOSE BLOOD QUANT: CPT

## 2025-02-14 PROCEDURE — 99239 HOSP IP/OBS DSCHRG MGMT >30: CPT

## 2025-02-14 PROCEDURE — 99221 1ST HOSP IP/OBS SF/LOW 40: CPT | Performed by: ORTHOPAEDIC SURGERY

## 2025-02-14 PROCEDURE — 2500000004 HC RX 250 GENERAL PHARMACY W/ HCPCS (ALT 636 FOR OP/ED): Performed by: STUDENT IN AN ORGANIZED HEALTH CARE EDUCATION/TRAINING PROGRAM

## 2025-02-14 PROCEDURE — 2500000001 HC RX 250 WO HCPCS SELF ADMINISTERED DRUGS (ALT 637 FOR MEDICARE OP): Performed by: STUDENT IN AN ORGANIZED HEALTH CARE EDUCATION/TRAINING PROGRAM

## 2025-02-14 PROCEDURE — 80048 BASIC METABOLIC PNL TOTAL CA: CPT

## 2025-02-14 PROCEDURE — 97165 OT EVAL LOW COMPLEX 30 MIN: CPT | Mod: GO

## 2025-02-14 RX ORDER — TRAMADOL HYDROCHLORIDE 25 MG/1
25 TABLET, COATED ORAL EVERY 8 HOURS PRN
Qty: 10 TABLET | Refills: 0 | Status: SHIPPED | OUTPATIENT
Start: 2025-02-14 | End: 2025-02-17

## 2025-02-14 RX ADMIN — ROSUVASTATIN CALCIUM 10 MG: 10 TABLET, FILM COATED ORAL at 09:09

## 2025-02-14 RX ADMIN — ICOSAPENT ETHYL 2 G: 1 CAPSULE, LIQUID FILLED ORAL at 09:09

## 2025-02-14 RX ADMIN — ICOSAPENT ETHYL 2 G: 1 CAPSULE, LIQUID FILLED ORAL at 17:09

## 2025-02-14 RX ADMIN — GABAPENTIN 300 MG: 300 CAPSULE ORAL at 20:31

## 2025-02-14 RX ADMIN — LOSARTAN POTASSIUM 25 MG: 25 TABLET, FILM COATED ORAL at 09:09

## 2025-02-14 RX ADMIN — CELECOXIB 200 MG: 100 CAPSULE ORAL at 09:09

## 2025-02-14 RX ADMIN — ENOXAPARIN SODIUM 40 MG: 40 INJECTION SUBCUTANEOUS at 17:09

## 2025-02-14 RX ADMIN — PROPRANOLOL HYDROCHLORIDE 10 MG: 20 TABLET ORAL at 20:31

## 2025-02-14 RX ADMIN — TRAMADOL HYDROCHLORIDE 25 MG: 50 TABLET, COATED ORAL at 17:13

## 2025-02-14 RX ADMIN — TRAMADOL HYDROCHLORIDE 25 MG: 50 TABLET, COATED ORAL at 04:54

## 2025-02-14 RX ADMIN — HYDROMORPHONE HYDROCHLORIDE 0.4 MG: 1 INJECTION, SOLUTION INTRAMUSCULAR; INTRAVENOUS; SUBCUTANEOUS at 10:15

## 2025-02-14 ASSESSMENT — COGNITIVE AND FUNCTIONAL STATUS - GENERAL
DRESSING REGULAR LOWER BODY CLOTHING: A LITTLE
MOBILITY SCORE: 15
HELP NEEDED FOR BATHING: A LOT
DRESSING REGULAR LOWER BODY CLOTHING: A LOT
MOBILITY SCORE: 10
WALKING IN HOSPITAL ROOM: A LOT
DRESSING REGULAR UPPER BODY CLOTHING: A LITTLE
MOVING TO AND FROM BED TO CHAIR: A LOT
STANDING UP FROM CHAIR USING ARMS: A LOT
TURNING FROM BACK TO SIDE WHILE IN FLAT BAD: A LITTLE
DAILY ACTIVITIY SCORE: 21
MOVING TO AND FROM BED TO CHAIR: A LOT
MOBILITY SCORE: 15
STANDING UP FROM CHAIR USING ARMS: A LOT
PERSONAL GROOMING: A LITTLE
DAILY ACTIVITIY SCORE: 22
STANDING UP FROM CHAIR USING ARMS: A LOT
DRESSING REGULAR UPPER BODY CLOTHING: A LITTLE
TURNING FROM BACK TO SIDE WHILE IN FLAT BAD: A LITTLE
MOVING TO AND FROM BED TO CHAIR: A LOT
WALKING IN HOSPITAL ROOM: A LOT
MOVING FROM LYING ON BACK TO SITTING ON SIDE OF FLAT BED WITH BEDRAILS: A LOT
DAILY ACTIVITIY SCORE: 16
HELP NEEDED FOR BATHING: A LITTLE
CLIMB 3 TO 5 STEPS WITH RAILING: A LOT
TOILETING: A LOT
TURNING FROM BACK TO SIDE WHILE IN FLAT BAD: A LOT
HELP NEEDED FOR BATHING: A LITTLE
CLIMB 3 TO 5 STEPS WITH RAILING: A LOT
CLIMB 3 TO 5 STEPS WITH RAILING: TOTAL
DRESSING REGULAR LOWER BODY CLOTHING: A LITTLE
WALKING IN HOSPITAL ROOM: TOTAL

## 2025-02-14 ASSESSMENT — PAIN SCALES - GENERAL
PAINLEVEL_OUTOF10: 3
PAINLEVEL_OUTOF10: 1
PAINLEVEL_OUTOF10: 7
PAINLEVEL_OUTOF10: 1
PAINLEVEL_OUTOF10: 0 - NO PAIN
PAINLEVEL_OUTOF10: 4

## 2025-02-14 ASSESSMENT — ACTIVITIES OF DAILY LIVING (ADL)
BATHING_ASSISTANCE: MODERATE
LACK_OF_TRANSPORTATION: NO

## 2025-02-14 ASSESSMENT — PAIN - FUNCTIONAL ASSESSMENT
PAIN_FUNCTIONAL_ASSESSMENT: 0-10

## 2025-02-14 NOTE — CARE PLAN
The patient's goals for the shift include      The clinical goals for the shift include pt will report tolerable pain levels

## 2025-02-14 NOTE — CONSULTS
"Reason For Consult  Patient seen for right hip pain    History Of Present Illness  Nabor Baldwin is a 74 y.o. male presenting with hip pain after a fall    Patient has considerable issues with foot drop on the right side from prior spine surgery and wears an AFO.     Past Medical History  He has a past medical history of Male erectile dysfunction, unspecified, Osteoarthritis of knee, unspecified, Personal history of other diseases of the circulatory system, Personal history of other diseases of the digestive system, Personal history of other diseases of the musculoskeletal system and connective tissue, and Unspecified visual loss.    Surgical History  He has a past surgical history that includes Other surgical history (08/21/2015); Vasectomy (08/21/2015); Appendectomy (08/21/2015); Cataract extraction (08/21/2015); Total knee arthroplasty (08/21/2015); and Spine surgery (09/21/2023).     Social History  He reports that he has never smoked. He has never used smokeless tobacco. He reports that he does not drink alcohol and does not use drugs.    Family History  Family History   Problem Relation Name Age of Onset    Transient ischemic attack Mother      Diabetes Father      Other (cardiac disorder) Father          cardiac disorder        Allergies  Atorvastatin and Magnesium oxide    Review of Systems  Noncontributory     Physical Exam  Right hamstring pain hip pain pain over the buttock area pain with leg raise chronic foot drop right lower extremity no shortening of the leg no pain with logroll     Head normocephalic atraumatic  Heart regular rate rhythm  Lungs are clear   abdominal exam nontender nondistended  Last Recorded Vitals  Blood pressure 103/66, pulse 79, temperature 36 °C (96.8 °F), resp. rate 16, height 1.727 m (5' 8\"), weight 90.7 kg (200 lb), SpO2 95%.    Relevant Results      Scheduled medications  celecoxib, 200 mg, oral, Daily  enoxaparin, 40 mg, subcutaneous, q24h  gabapentin, 300 mg, oral, " Nightly  icosapent ethyL, 2 g, oral, BID  insulin lispro, 0-5 Units, subcutaneous, TID AC  losartan, 25 mg, oral, Daily  polyethylene glycol, 17 g, oral, Daily  propranolol, 10 mg, oral, Nightly  rosuvastatin, 10 mg, oral, Daily      Continuous medications     PRN medications  PRN medications: acetaminophen **OR** acetaminophen **OR** acetaminophen, HYDROmorphone, ondansetron **OR** ondansetron, traMADol  Results for orders placed or performed during the hospital encounter of 02/13/25 (from the past 24 hours)   POCT GLUCOSE   Result Value Ref Range    POCT Glucose 140 (H) 74 - 99 mg/dL   CBC   Result Value Ref Range    WBC 10.0 4.4 - 11.3 x10*3/uL    nRBC 0.0 0.0 - 0.0 /100 WBCs    RBC 4.06 (L) 4.50 - 5.90 x10*6/uL    Hemoglobin 12.5 (L) 13.5 - 17.5 g/dL    Hematocrit 37.7 (L) 41.0 - 52.0 %    MCV 93 80 - 100 fL    MCH 30.8 26.0 - 34.0 pg    MCHC 33.2 32.0 - 36.0 g/dL    RDW 11.7 11.5 - 14.5 %    Platelets 179 150 - 450 x10*3/uL   Basic metabolic panel   Result Value Ref Range    Glucose 123 (H) 74 - 99 mg/dL    Sodium 136 136 - 145 mmol/L    Potassium 4.3 3.5 - 5.3 mmol/L    Chloride 101 98 - 107 mmol/L    Bicarbonate 27 21 - 32 mmol/L    Anion Gap 12 10 - 20 mmol/L    Urea Nitrogen 25 (H) 6 - 23 mg/dL    Creatinine 0.92 0.50 - 1.30 mg/dL    eGFR 88 >60 mL/min/1.73m*2    Calcium 9.1 8.6 - 10.3 mg/dL   POCT GLUCOSE   Result Value Ref Range    POCT Glucose 124 (H) 74 - 99 mg/dL        Assessment/Plan     Complete hamstring tear right side    We anticipate nonsurgical management multiple medical comorbidities and associated body habitus and deconditioning and patient age    Ambulate with PT weight-bear as tolerated AFO gait training fall prevention    Estevan Bailon MD

## 2025-02-14 NOTE — DISCHARGE SUMMARY
Discharge Diagnosis  Partial hamstring tear, initial encounter    Issues Requiring Follow-Up  none    Discharge Meds     Your medication list        START taking these medications        Instructions Last Dose Given Next Dose Due   traMADoL 25 mg tablet      Take 25 mg by mouth every 8 hours if needed for severe pain (7 - 10) for up to 3 days.              CHANGE how you take these medications        Instructions Last Dose Given Next Dose Due   gabapentin 300 mg capsule  Commonly known as: Neurontin  What changed: when to take this      Take 1 capsule (300 mg) by mouth 2 times a day.              CONTINUE taking these medications        Instructions Last Dose Given Next Dose Due   celecoxib 200 mg capsule  Commonly known as: CeleBREX           icosapent ethyL 1 gram capsule  Commonly known as: Vascepa      Take 2 capsules (2 g) by mouth 2 times daily (morning and late afternoon).       losartan 25 mg tablet  Commonly known as: Cozaar           magnesium lactate CR 84 mg ER tablet  Commonly known as: Magtab           metFORMIN (OSM) 500 mg 24 hr tablet  Commonly known as: Fortamet      Take 2 tablets (1,000 mg) by mouth 2 times a day with meals.       One Daily Multi-Vit w-Mineral 4.5 mg iron tablet  Generic drug: multivitamin with minerals           OneTouch Delica Plus Lancet 33 gauge misc  Generic drug: lancets           OneTouch Ultra Test strip  Generic drug: blood sugar diagnostic           OneTouch Ultra2 Meter misc  Generic drug: blood-glucose meter           propranolol 20 mg tablet  Commonly known as: Inderal           rosuvastatin 10 mg tablet  Commonly known as: Crestor           semaglutide 2 mg/dose (8 mg/3 mL) pen injector      Inject 2 mg under the skin 1 (one) time per week.                 Where to Get Your Medications        You can get these medications from any pharmacy    Bring a paper prescription for each of these medications  traMADoL 25 mg tablet         Test Results Pending At  Discharge  Pending Labs       No current pending labs.            Last Vitals  Vitals:    02/13/25 1939 02/13/25 2205 02/14/25 0036 02/14/25 0728   BP: 117/73 111/72 103/66 109/62   BP Location:    Right arm   Patient Position: Sitting   Sitting   Pulse: 81 91 79 76   Resp: 15 18 16 16   Temp: 36.9 °C (98.4 °F) 36.5 °C (97.7 °F) 36 °C (96.8 °F) 36.6 °C (97.9 °F)   TempSrc: Temporal   Temporal   SpO2: 95% 94% 95% 96%   Weight:       Height:           Hospital Course   Nabor Baldwin is a 74 y.o. male with PMH right foot drop status post lumbar stenosis surgery 2022, T2DM, HTN, HLD, BPH, and BRIGITTE uses a CPAP at night who is presenting with right leg pain and inability to ambulate.  Patient was walking outside and slipped on the ice on his right leg causing him to have right upper leg and hip pain.  Patient has history of right foot drop and did have his brace on.  He did not hit his head or LOC.  He denies chest pain or shortness of breath.  Denies abdominal pain, nausea, vomiting or diarrhea.  Patient was unable to put weight on his right leg and was not able to ambulate.  CBC and BMP without abnormalities.  Patient will be admitted for further evaluation and treatment. MRI showed complete and partial hamstring tear.  Pt was seen by ortho, no surgical intervention needed at this time.  WBAT and Rehab, keep ACE on leg for support.  Pt was seen by PT and recommend SNF on discharge.  On day of discharge pt hemodynamically stable.    Pertinent Physical Exam At Time of Discharge  Physical Exam  Constitutional:       Appearance: He is obese. He is ill-appearing.   HENT:      Head: Normocephalic.      Mouth/Throat:      Mouth: Mucous membranes are moist.   Eyes:      Pupils: Pupils are equal, round, and reactive to light.   Cardiovascular:      Rate and Rhythm: Normal rate and regular rhythm.      Heart sounds: Normal heart sounds, S1 normal and S2 normal.   Pulmonary:      Effort: Pulmonary effort is normal.      Breath  sounds: Normal breath sounds.   Abdominal:      General: Bowel sounds are normal.      Palpations: Abdomen is soft.   Musculoskeletal:      Cervical back: Neck supple.      Right foot: Decreased range of motion. Foot drop present.   Skin:     General: Skin is warm.   Neurological:      Mental Status: He is alert and oriented to person, place, and time.      Motor: Weakness present.   Psychiatric:         Mood and Affect: Mood normal.         Behavior: Behavior normal.   Outpatient Follow-Up  Future Appointments   Date Time Provider Department Center   2/21/2025 10:15 AM Manuelito Almeida MD PhD [id number] [facility]   4/24/2025  2:00 PM Rodney Maynard DO [id number] [facility]   8/27/2025 10:00 AM Rubin Arguelles MD [id number] [facility]         IBETH Arteaga-CNP

## 2025-02-14 NOTE — PROGRESS NOTES
Physical Therapy    Physical Therapy Evaluation    Patient Name: Nabor Baldwin  MRN: [id number]  Today's Date: 2/14/2025   Time Calculation  Start Time: 0934  Stop Time: 1007  Time Calculation (min): 33 min  906/906-A    Assessment/Plan   PT Assessment  PT Assessment Results: Decreased strength, Decreased range of motion, Decreased endurance, Impaired balance, Decreased mobility, Pain  Rehab Prognosis: Good  Barriers to Discharge Home: Caregiver assistance, Physical needs  Caregiver Assistance: Caregiver assistance needed per identified barriers - however, level of patient's required assistance exceeds assistance available at home  Physical Needs: Stair navigation into home limited by function/safety, High falls risk due to function or environment, Ambulating household distances limited by function/safety, In-home setup navigation limited by function/safety  Evaluation/Treatment Tolerance: Patient limited by pain, Patient limited by fatigue  Medical Staff Made Aware: Yes  Strengths: Living arrangement secure  Barriers to Participation: Comorbidities  Assessment Comment: Pt. s/p R hamstring tear ad is unablet o tolerate WB R LE due to pain and spasming. Pt. requried A for bed mobility and transfers. Recommend continued therapy at a moderate intensity level following D/C.  End of Session Patient Position: Up in chair, Alarm on (Call light within reach)  IP OR SWING BED PT PLAN  Inpatient or Swing Bed: Inpatient  PT Plan  Treatment/Interventions: Bed mobility, Transfer training, Gait training, Balance training, Strengthening, Endurance training, Therapeutic exercise  PT Plan: Ongoing PT  PT Frequency: 4 times per week  PT Discharge Recommendations: Moderate intensity level of continued care  PT Recommended Transfer Status: Assist x2, Assistive device  Physical Therapy eval completed per MD requisition. P.T. recommendations as outlined above. Recommend D/C from acute care when medically appropriate as deemed by  medical staff.          General Visit Information:  General  Reason for Referral: impaired mobility  Referred By: [facility] and tx 2/13/25  Past Medical History Relevant to Rehab: includes: CPAP, BRIGITTE, dyslipidemia, HTN, DM, appey, R TKA, spine surgery, R foot drop  Family/Caregiver Present: No  Co-Treatment: OT  Co-Treatment Reason: Pt. seen with OT to maximize safety and function  Patient Position Received: Bed, 4 rail up, Alarm off, not on at start of session  Preferred Learning Style: auditory, verbal  General Comment: Pt. is a 73yo who [place] [facility] [place] with c/o R leg pain after fall, slipped on ice, R thigh muscle strain.   R hip X-ray (2/13) (-) fracture    R femur X-ray (2/13)  (-) fracture,   MR R femur (2/13) (+) full thickness tear of semitendinosus tendon and biceps femoris from ischial tuberosity. Quad tendon intact.. Large hematoma prox semitendinosis muscle, complete hamstring tear.   Ortho consult (2/14): (+) complete hamstring tear right side,anticipate nonsurgical management multiple medical comorbidities and associated body habitus and deconditioning and patient age, OK to ambulate, WBAT R LE with AFO gait training, fall prevention  Dx: R hamstring tear    Home Living:  Home Living  Home Living Comments: Pt. lives with spouse in a 2 story home with bed and full bath on 2nd floor with 1 flight and HR to access. Pt has recliner and 1/2 bath on 1st floor. Walk in shower, no seat or grab bar. Pt has 3 +1 JUANJO with HR on L going into home    Prior Level of Function:  Prior Function Per Pt/Caregiver Report  Prior Function Comments: Per Pt. he was I with ADLs, shares IADLs with spouse, was indep with med mgmt, owns FWW and amb with cane prior to adm. Has AFO for R LE, foot drop with increased plantar flexion tone noted. Reports one fall in last 3 months.    Precautions:  Precautions  LE Weight Bearing Status:  (WBAT R LE)  Medical Precautions:  (Ace wrap to R thigh; Activity order:  No restrictions)  Precautions Comment: Per EMR: High fall risk       Objective     Pain:  Pain Assessment  Pain Assessment: 0-10  0-10 (Numeric) Pain Score: 1 (1/10 at rest; c/o severe cramping R thigh with movement (did not rate))  Pain Type: Acute pain  Pain Location:  (Thigh)  Pain Orientation: Right  Pain Interventions: Repositioned    Cognition:  Cognition  Overall Cognitive Status: Within Functional Limits    General Assessments:      Activity Tolerance  Endurance: Decreased tolerance for upright activites  Sensation  Sensation Comment: Pt. reports neuropathy B LEs              Dynamic Sitting Balance  Dynamic Sitting-Comments: Good static and dynamic sitting balance  Dynamic Standing Balance  Dynamic Standing-Comments: Fair- static and dynamic standing balance    Functional Assessments:     Bed Mobility  Bed Mobility: Yes  Bed Mobility 1  Bed Mobility 1: Supine to sitting  Level of Assistance 1: Maximum verbal cues  Bed Mobility Comments 1: A to maneuver LEs over EOB and to elevate trunk from bed. R LE frequently cramping during transitions  Bed Mobility 2  Bed Mobility  2: Sitting to supine  Level of Assistance 2: Maximum assistance  Bed Mobility Comments 2: A to lift LEs onto bed and to control descent/placement of trunk  Transfers  Transfer: Yes  Transfer 1  Technique 1: Sit to stand  Transfer Device 1:  (FWW)  Transfer Level of Assistance 1: Moderate assistance, +2  Trials/Comments 1: A for lifting, transferring weight over feet, steadying. VC's for hand placement. Pt. unable to tolerate WB on R LE in standing. R LE AFO in place  Transfers 2  Technique 2: Stand to sit  Transfer Device 2:  (FWW)  Transfer Level of Assistance 2: Moderate assistance  Trials/Comments 2: A to control descent. VC's for hand placement.  Transfers 3  Technique 3: Stand pivot  Transfer Device 3:  (FWW)  Transfer Level of Assistance 3: Moderate assistance  Trials/Comments 3: A for balance, safety, maneuvering FWW. Pt. was unable to  WB on R LE due to pain. R AFO in place.  Ambulation/Gait Training  Ambulation/Gait Training Performed: No (Pt. was only able to complete stand pivot transfers on  eval.)  Stairs  Stairs: No       Extremity/Trunk Assessments:  RUE   RUE : Within Functional Limits  LUE   LUE: Within Functional Limits  RLE   RLE :  (Hip AROM WFL, knee lacking full extension due to increased pain, PROM R ankle DF to neutral; strength not assessed at hip or knee, ankle DF 2/5, PF 2+/5)  LLE   LLE : Within Functional Limits    Outcome Measures:     [facility] Basic Mobility  Turning from your back to your side while in a flat bed without using bedrails: A lot  Moving from lying on your back to sitting on the side of a flat bed without using bedrails: A lot  Moving to and from bed to chair (including a wheelchair): A lot  Standing up from a chair using your arms (e.g. wheelchair or bedside chair): A lot  To walk in hospital room: Total  Climbing 3-5 steps with railing: Total  Basic Mobility - Total Score: 10                                                             Goals:  Encounter Problems       Encounter Problems (Active)       PT Problem       Pt. will transfer supine/sit with MOD A x 1 (Progressing)       Start:  02/14/25    Expected End:  02/28/25            Pt. will transfer sit/stand with FWW with MIN A x 1 (Progressing)       Start:  02/14/25    Expected End:  02/28/25            Pt.will ambulate 25' with FWW with MIN A x 1 (Not Progressing)       Start:  02/14/25    Expected End:  02/28/25            Pt. will perform 2 x 15 B LE AROM exercises  (Not Progressing)       Start:  02/14/25    Expected End:  02/28/25                 Education Documentation  Mobility Training, taught by Héctor Jarrett, PT at 2/14/2025 12:51 PM.  Learner: Patient  Readiness: Acceptance  Method: Explanation  Response: Verbalizes Understanding, Needs Reinforcement  Comment: Role of PT, transfers, bed mobility, safety, PT POC

## 2025-02-14 NOTE — DISCHARGE INSTRUCTIONS
Thank you for choosing [facility]. It has been a pleasure taking part in your medical care. Please follow up with your primary care provider as instructed. If your symptoms should persist or worsen, please contact your primary care physician, or in the case of an emergency proceed to the nearest Emergency Room for further care. If you have any questions about the care you received, please call [facility] at [phone]. Thank you again! TOM Grier

## 2025-02-14 NOTE — CARE PLAN
The patient's goals for the shift include      The clinical goals for the shift include pt safety    Over the shift, the patient did not make progress toward the following goals. Barriers to progression include understanding poc. Recommendations to address these barriers include education.

## 2025-02-14 NOTE — PROGRESS NOTES
02/14/25 1549   Discharge Planning   Living Arrangements Spouse/significant other   Support Systems Spouse/significant other   Assistance Needed pt indep iwth ADLS, shares IADLS iwth spouse, indep with med mgmt, owns ww and amb with cane prior to adm. Has AFO for R LE, foot drop   Type of Residence Private residence   Home or Post Acute Services Post acute facilities (Rehab/SNF/etc)   Type of Post Acute Facility Services Rehab;Skilled nursing   Expected Discharge Disposition Inter   Does the patient need discharge transport arranged? Yes   RoundTrip coordination needed? Yes   Transportation Needs   In the past 12 months, has lack of transportation kept you from medical appointments or from getting medications? no   In the past 12 months, has lack of transportation kept you from meetings, work, or from getting things needed for daily living? No   Patient Choice   Provider Choice list and CMS website (https://medicare.gov/care-compare#search) for post-acute Quality and Resource Measure Data were provided and reviewed with: Patient;Family   Stroke Family Assessment   Stroke Family Assessment Needed No   Intensity of Service   Intensity of Service 0-30 min     Met w/ pt and spouse. Pt/ot was just in to evaluate pt. Pt requires placement. Pt and spouse request [facility] as he has been to the facility in the past.  Pt here w/ VA benefit listed. Pt ans spouse state he has medicare and supplement.   Referral sent to [facility]. Facility noted pt has [employer]. Adv plan.  Pt nor spouse have his ins card. [employer] info obtained and scanned to pt's chart. Pt/ot notes pending to send to [facility].  [facility] accept pt.  team asked to start precert. Pt is ready for discharge. Precert required Rn and TOM de anda.     Nicolasa    IRF Precert Request Status: Pending  Pending Auth. # [id number]   Date: 2/14/2025

## 2025-02-14 NOTE — PROGRESS NOTES
Occupational Therapy    Evaluation    Patient Name: Nabor Baldwin  MRN: [id number]  : 1950  Today's Date: 25  Time Calculation  Start Time: 933  Stop Time:   Time Calculation (min): 34 min     906/906-A    Assessment:  OT Assessment: pt presents with decreased safety with functional transfers, ADLS, will benefit from con't skilled OT to address impairments  Prognosis: Good  Barriers to Discharge Home: Physical needs, Caregiver assistance  Caregiver Assistance: Caregiver assistance needed per identified barriers - however, level of patient's required assistance exceeds assistance available at home  Physical Needs: 24hr mobility assistance needed, 24hr ADL assistance needed, High falls risk due to function or environment, Stair navigation into home limited by function/safety  End of Session Patient Position: Up in chair, Alarm on (B LE elevated, call light in reach)  OT Assessment Results: Decreased ADL status, Decreased endurance, Decreased functional mobility  Prognosis: Good    Plan:  Treatment Interventions: ADL retraining, Functional transfer training, Endurance training, Patient/family training  OT Frequency: 2 times per week  OT Discharge Recommendations: Moderate intensity level of continued care  OT Recommended Transfer Status: Moderate assist  OT - OK to Discharge: Yes  Treatment Interventions: ADL retraining, Functional transfer training, Endurance training, Patient/family training  Subjective     Current Problem:  1. Muscle strain of right thigh, initial encounter        2. Inability to walk        3. Partial hamstring tear, initial encounter  traMADol 25 mg tablet    Referral to Physical Therapy    Referral to Occupational Therapy              General:   OT Received On: 25  General  Reason for Referral: ADL impairment  Referred By: [facility] eval and tx 25  Past Medical History Relevant to Rehab: CPAP, BRIGITTE, dyslipidemia, HTN, DM, appey, R TKA, spine surgery, R foot  drop  Family/Caregiver Present: No  Co-Treatment: PT  Co-Treatment Reason: to maximize pt/staff safety  Patient Position Received: Bed, 4 rail up, Alarm off, not on at start of session  General Comment: pt to ED 2/13 with c/o R leg pain after fall, slipped on ice, R thigh muscle strain. R hip xray 2/13 negative, R femur xray negative, MR femur 2/13 full thickness tear of semitendinosus tendon and biceps femoris from ischial tuberosity. Quad tendon intact.. Large hematoma prox semitendinosis muscle, complete hamstring tear.    Precautions:  LE Weight Bearing Status:  (WBAT R LE)  Medical Precautions: Fall precautions           Pain:  Pain Assessment  Pain Assessment: 0-10  0-10 (Numeric) Pain Score: 1 (at rest)  Pain Type: Acute pain  Pain Location:  (thigh)  Pain Orientation: Right  Objective     Cognition:  Overall Cognitive Status: Within Functional Limits             Home Living:  Home Living Comments: lives with spouse, 2 story home, bed and full bath on 2nd floor iw HR. Pt has recliner and 1/2 bath on 1st floor. Walk in shower, no seat or grab bar. Pt has 3 +1 JUANJO with HR on L going into home    Prior Function:  Prior Function Comments: per pt indep iwth ADLS, shares IADLS iwth spouse, indep with med mgmt, owns ww and amb with cane prior to adm. Has AFO for R LE, foot drop with increased plantar flexion tone noted. Reports one fall.           Activities of Daily Living:   Eating Assistance: Independent  Grooming Assistance: Stand by  Grooming Deficit: Setup (seated)  Bathing Assistance: Moderate  UE Dressing Assistance: Stand by  LE Dressing Assistance: Maximal  LE Dressing Deficit:  (don/doff R AFO)  Toileting Assistance with Device: Minimal  Functional Assistance:  (pt ambulated 3' with mod A, able to hop with use of ww going to L side. Pt unable to tolerate putting weight down through R LE)                         Activity Tolerance:  Endurance: Decreased tolerance for upright activites           Bed  Mobility/Transfers: Bed Mobility  Bed Mobility:  (sup to sit max A with HOB at 40)  Transfers  Transfer:  (sit to stand from EOB mod A x 2, bed to recliner chair mod A with ww)                Balance:  Static Sitting: good  Dynamic Sitting: fair   Static Standing: fair-  Dynamic Standing: poor      Vision:Vision - Basic Assessment  Current Vision: Wears glasses all the time        Sensation:  Light Touch:  (pt reports neuropathy B LE, sensation to light touch B hands intact)    Strength:  Strength Comments: B UE 4/5 throughout        Extremities: RUE   RUE : Within Functional Limits and LUE   LUE: Within Functional Limits    Outcome Measures: [facility] Daily Activity  Putting on and taking off regular lower body clothing: A lot  Bathing (including washing, rinsing, drying): A lot  Putting on and taking off regular upper body clothing: A little  Toileting, which includes using toilet, bedpan or urinal: A lot  Taking care of personal grooming such as brushing teeth: A little  Eating Meals: None  Daily Activity - Total Score: 16    Education Documentation  Precautions, taught by Ana Maria De Anda OT at 2/14/2025 12:04 PM.  Learner: Patient  Readiness: Acceptance  Method: Explanation  Response: Verbalizes Understanding, Needs Reinforcement    ADL Training, taught by Ana Maria De Anda OT at 2/14/2025 12:04 PM.  Learner: Patient  Readiness: Acceptance  Method: Explanation  Response: Verbalizes Understanding, Needs Reinforcement                 Goals:  Encounter Problems       Encounter Problems (Active)       OT Goals       pt will dress LB with min A (Progressing)       Start:  02/14/25    Expected End:  02/28/25            Pt will bathe/dress UB with SBA (Progressing)       Start:  02/14/25    Expected End:  02/28/25            Pt will transfer to bed, chair, toilet with CGA (Progressing)       Start:  02/14/25    Expected End:  02/28/25            Pt will tolerate 10 minutes of functional activity with one rest break.  (Progressing)       Start:  02/14/25    Expected End:  02/28/25

## 2025-02-15 LAB
ANION GAP SERPL CALC-SCNC: 11 MMOL/L (ref 10–20)
BUN SERPL-MCNC: 21 MG/DL (ref 6–23)
CALCIUM SERPL-MCNC: 9.1 MG/DL (ref 8.6–10.3)
CHLORIDE SERPL-SCNC: 100 MMOL/L (ref 98–107)
CO2 SERPL-SCNC: 28 MMOL/L (ref 21–32)
CREAT SERPL-MCNC: 0.91 MG/DL (ref 0.5–1.3)
EGFRCR SERPLBLD CKD-EPI 2021: 89 ML/MIN/1.73M*2
ERYTHROCYTE [DISTWIDTH] IN BLOOD BY AUTOMATED COUNT: 11.8 % (ref 11.5–14.5)
GLUCOSE BLD MANUAL STRIP-MCNC: 112 MG/DL (ref 74–99)
GLUCOSE BLD MANUAL STRIP-MCNC: 116 MG/DL (ref 74–99)
GLUCOSE BLD MANUAL STRIP-MCNC: 164 MG/DL (ref 74–99)
GLUCOSE BLD MANUAL STRIP-MCNC: 176 MG/DL (ref 74–99)
GLUCOSE SERPL-MCNC: 121 MG/DL (ref 74–99)
HCT VFR BLD AUTO: 36.2 % (ref 41–52)
HGB BLD-MCNC: 12.2 G/DL (ref 13.5–17.5)
HOLD SPECIMEN: NORMAL
MCH RBC QN AUTO: 31 PG (ref 26–34)
MCHC RBC AUTO-ENTMCNC: 33.7 G/DL (ref 32–36)
MCV RBC AUTO: 92 FL (ref 80–100)
NRBC BLD-RTO: 0 /100 WBCS (ref 0–0)
PLATELET # BLD AUTO: 244 X10*3/UL (ref 150–450)
POTASSIUM SERPL-SCNC: 4 MMOL/L (ref 3.5–5.3)
RBC # BLD AUTO: 3.93 X10*6/UL (ref 4.5–5.9)
SODIUM SERPL-SCNC: 135 MMOL/L (ref 136–145)
WBC # BLD AUTO: 8.8 X10*3/UL (ref 4.4–11.3)

## 2025-02-15 PROCEDURE — 85027 COMPLETE CBC AUTOMATED: CPT

## 2025-02-15 PROCEDURE — 2500000001 HC RX 250 WO HCPCS SELF ADMINISTERED DRUGS (ALT 637 FOR MEDICARE OP): Performed by: STUDENT IN AN ORGANIZED HEALTH CARE EDUCATION/TRAINING PROGRAM

## 2025-02-15 PROCEDURE — 2500000002 HC RX 250 W HCPCS SELF ADMINISTERED DRUGS (ALT 637 FOR MEDICARE OP, ALT 636 FOR OP/ED): Performed by: STUDENT IN AN ORGANIZED HEALTH CARE EDUCATION/TRAINING PROGRAM

## 2025-02-15 PROCEDURE — 2500000001 HC RX 250 WO HCPCS SELF ADMINISTERED DRUGS (ALT 637 FOR MEDICARE OP)

## 2025-02-15 PROCEDURE — 82947 ASSAY GLUCOSE BLOOD QUANT: CPT | Mod: 59

## 2025-02-15 PROCEDURE — G0378 HOSPITAL OBSERVATION PER HR: HCPCS

## 2025-02-15 PROCEDURE — 99232 SBSQ HOSP IP/OBS MODERATE 35: CPT

## 2025-02-15 PROCEDURE — 96372 THER/PROPH/DIAG INJ SC/IM: CPT

## 2025-02-15 PROCEDURE — 80048 BASIC METABOLIC PNL TOTAL CA: CPT

## 2025-02-15 PROCEDURE — 2500000004 HC RX 250 GENERAL PHARMACY W/ HCPCS (ALT 636 FOR OP/ED)

## 2025-02-15 PROCEDURE — 36415 COLL VENOUS BLD VENIPUNCTURE: CPT

## 2025-02-15 PROCEDURE — 99232 SBSQ HOSP IP/OBS MODERATE 35: CPT | Performed by: ORTHOPAEDIC SURGERY

## 2025-02-15 RX ORDER — POLYETHYLENE GLYCOL 3350 17 G/17G
17 POWDER, FOR SOLUTION ORAL DAILY PRN
Status: DISCONTINUED | OUTPATIENT
Start: 2025-02-15 | End: 2025-02-20 | Stop reason: HOSPADM

## 2025-02-15 RX ORDER — ICOSAPENT ETHYL 1 G/1
1 CAPSULE ORAL
Status: DISCONTINUED | OUTPATIENT
Start: 2025-02-15 | End: 2025-02-20 | Stop reason: HOSPADM

## 2025-02-15 RX ADMIN — PROPRANOLOL HYDROCHLORIDE 10 MG: 20 TABLET ORAL at 20:23

## 2025-02-15 RX ADMIN — ENOXAPARIN SODIUM 40 MG: 40 INJECTION SUBCUTANEOUS at 16:32

## 2025-02-15 RX ADMIN — LOSARTAN POTASSIUM 25 MG: 25 TABLET, FILM COATED ORAL at 09:13

## 2025-02-15 RX ADMIN — TRAMADOL HYDROCHLORIDE 25 MG: 50 TABLET, COATED ORAL at 07:49

## 2025-02-15 RX ADMIN — ROSUVASTATIN CALCIUM 10 MG: 10 TABLET, FILM COATED ORAL at 07:51

## 2025-02-15 RX ADMIN — ICOSAPENT ETHYL 1 G: 1 CAPSULE, LIQUID FILLED ORAL at 07:51

## 2025-02-15 RX ADMIN — GABAPENTIN 300 MG: 300 CAPSULE ORAL at 20:23

## 2025-02-15 RX ADMIN — TRAMADOL HYDROCHLORIDE 25 MG: 50 TABLET, COATED ORAL at 18:25

## 2025-02-15 RX ADMIN — CELECOXIB 200 MG: 100 CAPSULE ORAL at 07:51

## 2025-02-15 RX ADMIN — ICOSAPENT ETHYL 1 G: 1 CAPSULE, LIQUID FILLED ORAL at 16:31

## 2025-02-15 ASSESSMENT — COGNITIVE AND FUNCTIONAL STATUS - GENERAL
CLIMB 3 TO 5 STEPS WITH RAILING: A LOT
DAILY ACTIVITIY SCORE: 22
MOBILITY SCORE: 15
TURNING FROM BACK TO SIDE WHILE IN FLAT BAD: A LITTLE
HELP NEEDED FOR BATHING: A LITTLE
TURNING FROM BACK TO SIDE WHILE IN FLAT BAD: A LITTLE
WALKING IN HOSPITAL ROOM: A LOT
MOVING TO AND FROM BED TO CHAIR: A LOT
STANDING UP FROM CHAIR USING ARMS: A LOT
DRESSING REGULAR LOWER BODY CLOTHING: A LITTLE

## 2025-02-15 ASSESSMENT — PAIN DESCRIPTION - LOCATION
LOCATION: LEG
LOCATION: LEG

## 2025-02-15 ASSESSMENT — PAIN DESCRIPTION - ORIENTATION: ORIENTATION: RIGHT

## 2025-02-15 ASSESSMENT — PAIN SCALES - GENERAL
PAINLEVEL_OUTOF10: 7
PAINLEVEL_OUTOF10: 4
PAINLEVEL_OUTOF10: 7

## 2025-02-15 NOTE — PROGRESS NOTES
"Daily Progress Note    Nabor Baldwin is a 74 y.o. male on day 0 of admission presenting with Partial hamstring tear, initial encounter.    Subjective   Patient seen and examined, labs and vitals reviewed.  Patient is in good spirits today, states that he feels okay at rest.  Endorses 10/10 pain with ambulation.  Denies chest pain, shortness of breath, abdominal pain, nausea, vomiting, diarrhea.  Discharge orders in, awaiting pre-CERT.  Per , pre-CERT will likely not return until Monday.       Objective   Physical Exam  Constitutional:       Appearance: Normal appearance. He is obese.   HENT:      Head: Normocephalic.      Mouth/Throat:      Mouth: Mucous membranes are moist.   Eyes:      Pupils: Pupils are equal, round, and reactive to light.   Cardiovascular:      Rate and Rhythm: Normal rate and regular rhythm.      Heart sounds: Normal heart sounds, S1 normal and S2 normal.   Pulmonary:      Effort: Pulmonary effort is normal.      Breath sounds: Normal breath sounds.   Abdominal:      General: Bowel sounds are normal.      Palpations: Abdomen is soft.   Musculoskeletal:      Cervical back: Neck supple.      Left lower leg: No edema.      Right foot: Foot drop present.      Comments: Right thigh wrapped with Ace bandage.  Trace RLE edema.  Decreased ROM of RLE.   Skin:     General: Skin is warm.   Neurological:      Mental Status: He is alert and oriented to person, place, and time.      Motor: Weakness present.   Psychiatric:         Mood and Affect: Mood normal.         Behavior: Behavior normal.         Last Recorded Vitals  Blood pressure 112/58, pulse 85, temperature 37.1 °C (98.8 °F), resp. rate 18, height 1.727 m (5' 8\"), weight 90.7 kg (200 lb), SpO2 94%.  Intake/Output last 3 Shifts:  I/O last 3 completed shifts:  In: - (0 mL/kg)   Out: 400 (4.4 mL/kg) [Urine:400 (0.1 mL/kg/hr)]  Weight: 90.7 kg     Medications  Scheduled medications  celecoxib, 200 mg, oral, Daily  enoxaparin, 40 mg, subcutaneous, " q24h  gabapentin, 300 mg, oral, Nightly  icosapent ethyL, 2 g, oral, BID  insulin lispro, 0-5 Units, subcutaneous, TID AC  losartan, 25 mg, oral, Daily  propranolol, 10 mg, oral, Nightly  rosuvastatin, 10 mg, oral, Daily      Continuous medications     PRN medications  PRN medications: acetaminophen **OR** acetaminophen **OR** acetaminophen, HYDROmorphone, ondansetron **OR** ondansetron, polyethylene glycol, traMADol    Labs  CBC:   Results from last 7 days   Lab Units 02/15/25  0715 02/14/25  0535   WBC AUTO x10*3/uL 8.8 10.0   RBC AUTO x10*6/uL 3.93* 4.06*   HEMOGLOBIN g/dL 12.2* 12.5*   HEMATOCRIT % 36.2* 37.7*   MCV fL 92 93   MCH pg 31.0 30.8   MCHC g/dL 33.7 33.2   RDW % 11.8 11.7   PLATELETS AUTO x10*3/uL 244 179     CMP:    Results from last 7 days   Lab Units 02/15/25  0715 02/14/25  0535   SODIUM mmol/L 135* 136   POTASSIUM mmol/L 4.0 4.3   CHLORIDE mmol/L 100 101   CO2 mmol/L 28 27   BUN mg/dL 21 25*   CREATININE mg/dL 0.91 0.92   GLUCOSE mg/dL 121* 123*   CALCIUM mg/dL 9.1 9.1     BMP:    Results from last 7 days   Lab Units 02/15/25  0715 02/14/25  0535   SODIUM mmol/L 135* 136   POTASSIUM mmol/L 4.0 4.3   CHLORIDE mmol/L 100 101   CO2 mmol/L 28 27   BUN mg/dL 21 25*   CREATININE mg/dL 0.91 0.92   CALCIUM mg/dL 9.1 9.1   GLUCOSE mg/dL 121* 123*     Magnesium:    Troponin:      BNP:     Lipid Panel:         Nutrition             Relevant Results  Results from last 7 days   Lab Units 02/15/25  0715 02/15/25  0641 02/14/25  1943 02/14/25  1543 02/14/25  1045 02/14/25  0552 02/14/25  0535   POCT GLUCOSE mg/dL  --  112* 161* 141* 119* 124*  --    GLUCOSE mg/dL 121*  --   --   --   --   --  123*     Lab Results   Component Value Date    HGBA1C 7.0 (H) 12/30/2024        Assessment/Plan    Mechanical fall  Right leg pain  Complete right hamstring tear  Right foot drop status post lumbar surgery  HTN/HLD  T2DM not requiring insulin  Lumbar spinal stenosis surgery 2022  BRIGITTE uses a CPAP at night  BPH  Palpitations  with PVCs on a beta-blocker at night    -Imaging negative for fracture  -Consult orthopedics, nonsurgical management  -Weightbearing as tolerated  -MRI right femur shows full-thickness tear of the conjoint tendon of the semitendinosis and biceps femoris from its origin and high-grade partial-thickness muscle tear of the semitendinosis and biceps femoris musculature with a large hematoma in the proximal semitendinosus muscle  -Ace bandage to upper thigh  -Pain management as needed  -A1c 7.0  -Diabetic diet with Accu-Chek and sliding scale  -Hold home metformin and Ozempic  -Continue home meds: Celebrex, gabapentin, losartan, propranolol, Crestor, Vascepa  -CBC and BMP daily  -CPAP at night  -PT/OT evaluation  -TCC for discharge planning  -Discussed with social work today, pre-CERT likely will not return until Monday      DVTp: Lovenox  PLAN: Acute rehab, pre-CERT pending    Vivian Badillo PA-C    Plan of care was discussed extensively with patient.  Patient verbalized understanding through teach back method.  All question and concerns addressed upon examination.    Of note, this documentation is completed using the Dragon Dictation system (voice recognition software). There may be spelling and/or grammatical errors that were not corrected prior to final submission.

## 2025-02-15 NOTE — PROGRESS NOTES
Pt discharge order is noted.  Inquired with precert team regarding pending precert.  Will continue to follow.      1049 per insurance team , precert remains pending.

## 2025-02-15 NOTE — CARE PLAN
The patient's goals for the shift include      The clinical goals for the shift include Patient will state that pain is controlled throughout the shift.    Over the shift, the patient continues to make  progress toward his goal of pain control.  He states that he currently has no pain.

## 2025-02-15 NOTE — PROGRESS NOTES
Patient resting comfortably.  Pain adequately controlled.      Pain notable to posterior right thigh proximally.  Intact to sensory and motor distally.  Negative Ping.      Right complete hamstring tear      Recommendations were made for weightbearing to tolerance.  Therapy to assist.  Would likely benefit from rehab.  Orthopedic team signing off for now.  Please call with questions or concerns.  Follow-up with Dr. Estevan Bailon within 2 weeks for ongoing care.

## 2025-02-16 VITALS
BODY MASS INDEX: 30.31 KG/M2 | HEIGHT: 68 IN | RESPIRATION RATE: 17 BRPM | SYSTOLIC BLOOD PRESSURE: 117 MMHG | TEMPERATURE: 97.9 F | DIASTOLIC BLOOD PRESSURE: 74 MMHG | HEART RATE: 85 BPM | WEIGHT: 200 LBS | OXYGEN SATURATION: 94 %

## 2025-02-16 LAB
ANION GAP SERPL CALC-SCNC: 10 MMOL/L (ref 10–20)
BUN SERPL-MCNC: 21 MG/DL (ref 6–23)
CALCIUM SERPL-MCNC: 8.9 MG/DL (ref 8.6–10.3)
CHLORIDE SERPL-SCNC: 101 MMOL/L (ref 98–107)
CO2 SERPL-SCNC: 30 MMOL/L (ref 21–32)
CREAT SERPL-MCNC: 0.86 MG/DL (ref 0.5–1.3)
EGFRCR SERPLBLD CKD-EPI 2021: >90 ML/MIN/1.73M*2
ERYTHROCYTE [DISTWIDTH] IN BLOOD BY AUTOMATED COUNT: 11.9 % (ref 11.5–14.5)
GLUCOSE BLD MANUAL STRIP-MCNC: 134 MG/DL (ref 74–99)
GLUCOSE BLD MANUAL STRIP-MCNC: 144 MG/DL (ref 74–99)
GLUCOSE BLD MANUAL STRIP-MCNC: 149 MG/DL (ref 74–99)
GLUCOSE BLD MANUAL STRIP-MCNC: 161 MG/DL (ref 74–99)
GLUCOSE SERPL-MCNC: 128 MG/DL (ref 74–99)
HCT VFR BLD AUTO: 35.3 % (ref 41–52)
HGB BLD-MCNC: 11.8 G/DL (ref 13.5–17.5)
MCH RBC QN AUTO: 30.8 PG (ref 26–34)
MCHC RBC AUTO-ENTMCNC: 33.4 G/DL (ref 32–36)
MCV RBC AUTO: 92 FL (ref 80–100)
NRBC BLD-RTO: 0 /100 WBCS (ref 0–0)
PLATELET # BLD AUTO: 250 X10*3/UL (ref 150–450)
POTASSIUM SERPL-SCNC: 4.3 MMOL/L (ref 3.5–5.3)
RBC # BLD AUTO: 3.83 X10*6/UL (ref 4.5–5.9)
SODIUM SERPL-SCNC: 137 MMOL/L (ref 136–145)
WBC # BLD AUTO: 8.3 X10*3/UL (ref 4.4–11.3)

## 2025-02-16 PROCEDURE — 80048 BASIC METABOLIC PNL TOTAL CA: CPT

## 2025-02-16 PROCEDURE — 2500000004 HC RX 250 GENERAL PHARMACY W/ HCPCS (ALT 636 FOR OP/ED)

## 2025-02-16 PROCEDURE — G0378 HOSPITAL OBSERVATION PER HR: HCPCS

## 2025-02-16 PROCEDURE — 96374 THER/PROPH/DIAG INJ IV PUSH: CPT

## 2025-02-16 PROCEDURE — 2500000001 HC RX 250 WO HCPCS SELF ADMINISTERED DRUGS (ALT 637 FOR MEDICARE OP)

## 2025-02-16 PROCEDURE — 36415 COLL VENOUS BLD VENIPUNCTURE: CPT

## 2025-02-16 PROCEDURE — 85027 COMPLETE CBC AUTOMATED: CPT

## 2025-02-16 PROCEDURE — 2500000002 HC RX 250 W HCPCS SELF ADMINISTERED DRUGS (ALT 637 FOR MEDICARE OP, ALT 636 FOR OP/ED): Performed by: STUDENT IN AN ORGANIZED HEALTH CARE EDUCATION/TRAINING PROGRAM

## 2025-02-16 PROCEDURE — 99232 SBSQ HOSP IP/OBS MODERATE 35: CPT

## 2025-02-16 PROCEDURE — 82947 ASSAY GLUCOSE BLOOD QUANT: CPT | Mod: 59

## 2025-02-16 PROCEDURE — 2500000004 HC RX 250 GENERAL PHARMACY W/ HCPCS (ALT 636 FOR OP/ED): Performed by: STUDENT IN AN ORGANIZED HEALTH CARE EDUCATION/TRAINING PROGRAM

## 2025-02-16 PROCEDURE — 97110 THERAPEUTIC EXERCISES: CPT | Mod: GP,CQ

## 2025-02-16 PROCEDURE — 96372 THER/PROPH/DIAG INJ SC/IM: CPT | Mod: 59

## 2025-02-16 PROCEDURE — 97530 THERAPEUTIC ACTIVITIES: CPT | Mod: GP,CQ

## 2025-02-16 PROCEDURE — 2500000001 HC RX 250 WO HCPCS SELF ADMINISTERED DRUGS (ALT 637 FOR MEDICARE OP): Performed by: STUDENT IN AN ORGANIZED HEALTH CARE EDUCATION/TRAINING PROGRAM

## 2025-02-16 RX ADMIN — ENOXAPARIN SODIUM 40 MG: 40 INJECTION SUBCUTANEOUS at 16:04

## 2025-02-16 RX ADMIN — ICOSAPENT ETHYL 1 G: 1 CAPSULE, LIQUID FILLED ORAL at 08:23

## 2025-02-16 RX ADMIN — PROPRANOLOL HYDROCHLORIDE 10 MG: 20 TABLET ORAL at 20:31

## 2025-02-16 RX ADMIN — CELECOXIB 200 MG: 100 CAPSULE ORAL at 08:23

## 2025-02-16 RX ADMIN — ICOSAPENT ETHYL 1 G: 1 CAPSULE, LIQUID FILLED ORAL at 16:04

## 2025-02-16 RX ADMIN — LOSARTAN POTASSIUM 25 MG: 25 TABLET, FILM COATED ORAL at 09:43

## 2025-02-16 RX ADMIN — GABAPENTIN 300 MG: 300 CAPSULE ORAL at 20:32

## 2025-02-16 RX ADMIN — TRAMADOL HYDROCHLORIDE 25 MG: 50 TABLET, COATED ORAL at 20:32

## 2025-02-16 RX ADMIN — ROSUVASTATIN CALCIUM 10 MG: 10 TABLET, FILM COATED ORAL at 08:23

## 2025-02-16 RX ADMIN — HYDROMORPHONE HYDROCHLORIDE 0.4 MG: 1 INJECTION, SOLUTION INTRAMUSCULAR; INTRAVENOUS; SUBCUTANEOUS at 15:04

## 2025-02-16 ASSESSMENT — COGNITIVE AND FUNCTIONAL STATUS - GENERAL
HELP NEEDED FOR BATHING: A LITTLE
MOVING FROM LYING ON BACK TO SITTING ON SIDE OF FLAT BED WITH BEDRAILS: A LITTLE
MOBILITY SCORE: 16
WALKING IN HOSPITAL ROOM: TOTAL
CLIMB 3 TO 5 STEPS WITH RAILING: TOTAL
CLIMB 3 TO 5 STEPS WITH RAILING: A LOT
TURNING FROM BACK TO SIDE WHILE IN FLAT BAD: A LITTLE
WALKING IN HOSPITAL ROOM: A LOT
MOVING TO AND FROM BED TO CHAIR: A LOT
TURNING FROM BACK TO SIDE WHILE IN FLAT BAD: A LITTLE
WALKING IN HOSPITAL ROOM: A LOT
DAILY ACTIVITIY SCORE: 22
MOVING TO AND FROM BED TO CHAIR: A LOT
MOVING TO AND FROM BED TO CHAIR: A LOT
HELP NEEDED FOR BATHING: A LITTLE
STANDING UP FROM CHAIR USING ARMS: A LOT
MOBILITY SCORE: 12
DRESSING REGULAR LOWER BODY CLOTHING: A LITTLE
TURNING FROM BACK TO SIDE WHILE IN FLAT BAD: A LITTLE
DAILY ACTIVITIY SCORE: 22
STANDING UP FROM CHAIR USING ARMS: A LITTLE
CLIMB 3 TO 5 STEPS WITH RAILING: A LOT
MOBILITY SCORE: 15
DRESSING REGULAR LOWER BODY CLOTHING: A LITTLE
STANDING UP FROM CHAIR USING ARMS: A LOT

## 2025-02-16 ASSESSMENT — PAIN SCALES - GENERAL
PAINLEVEL_OUTOF10: 2
PAINLEVEL_OUTOF10: 7
PAINLEVEL_OUTOF10: 3
PAINLEVEL_OUTOF10: 0 - NO PAIN

## 2025-02-16 ASSESSMENT — PAIN - FUNCTIONAL ASSESSMENT
PAIN_FUNCTIONAL_ASSESSMENT: 0-10
PAIN_FUNCTIONAL_ASSESSMENT: 0-10

## 2025-02-16 NOTE — PROGRESS NOTES
02/16/25 0925   Discharge Planning   Home or Post Acute Services Post acute facilities (Rehab/SNF/etc)   Type of Post Acute Facility Services Rehab   Expected Discharge Disposition Rehab  ( [facility])   Does the patient need discharge transport arranged? Yes   RoundTrip coordination needed? Yes   Patient Choice   Provider Choice list and CMS website (https://medicare.gov/care-compare#search) for post-acute Quality and Resource Measure Data were provided and reviewed with: Patient;Family   Patient / Family choosing to utilize agency / facility established prior to hospitalization No   Stroke Family Assessment   Stroke Family Assessment Needed No   Intensity of Service   Intensity of Service 0-30 min     Precert started for [place] [employer] on 2/14 for  [facility]. Still pending at this time. Pt medically ready for dc once auth obtained.

## 2025-02-16 NOTE — PROGRESS NOTES
Physical Therapy    Physical Therapy Treatment    Patient Name: Nabor Baldwin  MRN: [id number]  Today's Date: 2/16/2025  Time Calculation  Start Time: 1430  Stop Time: 1457  Time Calculation (min): 27 min     906/906-A    Assessment/Plan   End of Session Communication: Bedside nurse, PCT/NA/CTA  Assessment Comment: Patient presents with good effort throughout todays session. Very motivated and willing to participate in all tasks however significant pain with functional mobility limiting tolerance to activity this date. Patient is unable to tolerate WBing through RLE this session, significant education provided on importance of positional changes and extending RLE to best ability. Patient would benefit from continued therapy to improve RLE ROM, reduce pain and improve functional mobility. Call light and all needs in reach.  End of Session Patient Position: Bed, 3 rail up, Alarm off, not on at start of session          General Visit Information:   PT  Visit  PT Received On: 02/16/25  General  Prior to Session Communication: Bedside nurse, PCT/NA/CTA  Patient Position Received: Bed, 3 rail up, Alarm off, not on at start of session  General Comment: Pt agreeable to therapy this date, visitors present throughout session.  Subjective     Precautions:  Precautions  LE Weight Bearing Status: Weight Bearing as Tolerated (RLE)  Medical Precautions: Fall precautions  Precautions Comment: Pt unable to apply any weight to RLE, keeping in a dependent/flexed position with significant education provided on concern for contracture if unable to extend at any point and benefits of ROM/positional changes     Objective     Pain:  Pain Assessment  Pain Assessment: 0-10  0-10 (Numeric) Pain Score: 2 (Increasing to 10 with activity during therapy session)  Pain Type: Acute pain  Pain Location: Leg  Pain Orientation: Right  Pain Interventions: Repositioned    Cognition:  Cognition  Overall Cognitive Status: Within Functional  Limits  Problem Solving: Exceptions to WFL  Safety/Judgement: Exceptions to WFL  Insight: Mild  Impulsive: Mildly  Processing Speed: Within funtional limits    Treatments:  Therapeutic Exercise  Therapeutic Exercise Performed: Yes  Therapeutic Exercise Activity 1: seated, RLE attempting LAQ to straighten both passively and actively to tolerance 15x        Bed Mobility  Bed Mobility: Yes  Bed Mobility 1  Bed Mobility 1: Supine to sitting  Level of Assistance 1: Contact guard  Bed Mobility Comments 1: Pt performed supine<>EOB CGA with use of bedrails to lift trunk. Pt demonstrates ability to walk BLE off edge of bed. Increased time required to complete.  Bed Mobility 2  Bed Mobility  2: Sitting to supine  Level of Assistance 2: Contact guard  Bed Mobility Comments 2: Pt performed EOB<>supine CGA, able to lift LEs onto bed and UEs to lower trunk.  Ambulation/Gait Training  Ambulation/Gait Training Performed: Yes  Ambulation/Gait Training 1  Surface 1: Level tile  Device 1: Rolling walker (FWW)  Gait Support Devices: Gait belt  Assistance 1: Maximum assistance, Moderate verbal cues (MaxA x2)  Quality of Gait 1: Antalgic  Comments/Distance (ft) 1: Pt ambulated 2' towards HOB with FWW, small hops due to inability to WB through RLE. Pt demonstrates heavy UE support and very antalgic with performance. VC to correct however pt unable to implement. Fair AD management.  Transfers  Transfer: Yes  Transfers 2  Transfer From 2: Bed to  Transfer to 2: Stand  Technique 2: Stand to sit, Sit to stand  Transfer Device 2: Walker, Gait belt (FWW)  Transfer Level of Assistance 2: Moderate assistance, +2, Maximum verbal cues  Trials/Comments 2: Pt performed STS x2 from EOB<>FWW ModA x2 for lifting to stand and trunk support. VC required for sequencing and proper hand placement. Unable to WB through RLE. PLB encouraged throughout, poor standing tolerances due to quick fatigue, pain in L knee and heavy UE support. Standing tolerance  ~45seconds each.          Outcome Measures:     [facility] Basic Mobility  Turning from your back to your side while in a flat bed without using bedrails: A little  Moving from lying on your back to sitting on the side of a flat bed without using bedrails: A little  Moving to and from bed to chair (including a wheelchair): A lot  Standing up from a chair using your arms (e.g. wheelchair or bedside chair): A lot  To walk in hospital room: Total  Climbing 3-5 steps with railing: Total  Basic Mobility - Total Score: 12                                      Education Documentation  Mobility Training, taught by Zuleyma Remy PTA at 2/16/2025  4:55 PM.  Learner: Patient  Readiness: Acceptance  Method: Explanation, Demonstration  Response: Verbalizes Understanding, Needs Reinforcement    Education Comments  No comments found.           EDUCATION:     Encounter Problems       Encounter Problems (Active)       PT Problem       Pt. will transfer supine/sit with MOD A x 1 (Progressing)       Start:  02/14/25    Expected End:  02/28/25            Pt. will transfer sit/stand with FWW with MIN A x 1 (Progressing)       Start:  02/14/25    Expected End:  02/28/25            Pt.will ambulate 25' with FWW with MIN A x 1 (Progressing)       Start:  02/14/25    Expected End:  02/28/25            Pt. will perform 2 x 15 B LE AROM exercises  (Progressing)       Start:  02/14/25    Expected End:  02/28/25

## 2025-02-16 NOTE — CARE PLAN
The patient's goals for the shift include      The clinical goals for the shift include pt will report tolerable pain levels    Over the shift, the patient did not make progress toward the following goals. Barriers to progression include understanding poc. Recommendations to address these barriers include education.

## 2025-02-16 NOTE — PROGRESS NOTES
"Daily Progress Note    Nabor Baldwin is a 74 y.o. male on day 0 of admission presenting with Partial hamstring tear, initial encounter.    Subjective   Resting comfortably states pain is better with Ace bandage.  Pending pre-CERT for discharge on Monday denies chest pain or shortness of breath.       Objective     Physical Exam    Physical Exam  Constitutional:       Appearance: He is obese.    HENT:      Head: Normocephalic.      Mouth/Throat:      Mouth: Mucous membranes are moist.   Eyes:      Pupils: Pupils are equal, round, and reactive to light.   Cardiovascular:      Rate and Rhythm: Normal rate and regular rhythm.      Heart sounds: Normal heart sounds, S1 normal and S2 normal.   Pulmonary:      Effort: Pulmonary effort is normal.      Breath sounds: Normal breath sounds.   Abdominal:      General: Bowel sounds are normal.      Palpations: Abdomen is soft.   Musculoskeletal:      Cervical back: Neck supple.      Right foot: Decreased range of motion. Foot drop present.   Skin:     General: Skin is warm.   Neurological:      Mental Status: He is alert and oriented to person, place, and time.      Motor: Weakness present.   Psychiatric:         Mood and Affect: Mood normal.         Behavior: Behavior normal.   Last Recorded Vitals  Blood pressure 94/55, pulse 90, temperature 37.1 °C (98.8 °F), resp. rate 16, height 1.727 m (5' 8\"), weight 90.7 kg (200 lb), SpO2 92%.  Intake/Output last 3 Shifts:  I/O last 3 completed shifts:  In: - (0 mL/kg)   Out: 1626 (17.9 mL/kg) [Urine:1625 (0.5 mL/kg/hr); Stool:1]  Weight: 90.7 kg     Medications  Scheduled medications  celecoxib, 200 mg, oral, Daily  enoxaparin, 40 mg, subcutaneous, q24h  gabapentin, 300 mg, oral, Nightly  icosapent ethyL, 1 g, oral, BID  insulin lispro, 0-5 Units, subcutaneous, TID AC  losartan, 25 mg, oral, Daily  propranolol, 10 mg, oral, Nightly  rosuvastatin, 10 mg, oral, Daily      Continuous medications     PRN medications  PRN medications: " acetaminophen **OR** acetaminophen **OR** acetaminophen, HYDROmorphone, ondansetron **OR** ondansetron, polyethylene glycol, traMADol    Labs  CBC:   Results from last 7 days   Lab Units 02/16/25  0628 02/15/25  0715 02/14/25  0535   WBC AUTO x10*3/uL 8.3 8.8 10.0   RBC AUTO x10*6/uL 3.83* 3.93* 4.06*   HEMOGLOBIN g/dL 11.8* 12.2* 12.5*   HEMATOCRIT % 35.3* 36.2* 37.7*   MCV fL 92 92 93   MCH pg 30.8 31.0 30.8   MCHC g/dL 33.4 33.7 33.2   RDW % 11.9 11.8 11.7   PLATELETS AUTO x10*3/uL 250 244 179     CMP:    Results from last 7 days   Lab Units 02/16/25  0628 02/15/25  0715 02/14/25  0535   SODIUM mmol/L 137 135* 136   POTASSIUM mmol/L 4.3 4.0 4.3   CHLORIDE mmol/L 101 100 101   CO2 mmol/L 30 28 27   BUN mg/dL 21 21 25*   CREATININE mg/dL 0.86 0.91 0.92   GLUCOSE mg/dL 128* 121* 123*   CALCIUM mg/dL 8.9 9.1 9.1     BMP:    Results from last 7 days   Lab Units 02/16/25  0628 02/15/25  0715 02/14/25  0535   SODIUM mmol/L 137 135* 136   POTASSIUM mmol/L 4.3 4.0 4.3   CHLORIDE mmol/L 101 100 101   CO2 mmol/L 30 28 27   BUN mg/dL 21 21 25*   CREATININE mg/dL 0.86 0.91 0.92   CALCIUM mg/dL 8.9 9.1 9.1   GLUCOSE mg/dL 128* 121* 123*       Relevant Results  Results from last 7 days   Lab Units 02/16/25  0628 02/16/25  0526 02/15/25  1945 02/15/25  1557 02/15/25  1056 02/15/25  0715 02/15/25  0641 02/14/25  0552 02/14/25  0535   POCT GLUCOSE mg/dL  --  134* 176* 116* 164*  --  112*   < >  --    GLUCOSE mg/dL 128*  --   --   --   --  121*  --   --  123*    < > = values in this interval not displayed.     Lab Results   Component Value Date    HGBA1C 7.0 (H) 12/30/2024        Assessment/Plan    Mechanical fall  Right leg pain  Right partial/complete hamstring tear  Right foot drop status post lumbar surgery  HTN/HLD  T2DM  Lumbar spinal stenosis surgery 2022  BRIGITTE uses a CPAP at night  BPH  Palpitations with PVCs on a beta-blocker at night    -Imaging negative for fracture  -Consult orthopedics no intervention at this time  weightbearing as  -MRI shows complete/partial tendon tear  -Ace bandage to upper thigh  -Pain management as needed  -A1c 7.0  -Diabetic diet with Accu-Chek and sliding scale  -Resume home med  -CBC and BMP daily  -CPAP at night  -PT/OT evaluation  -TCC for discharge planning      DVTp: Lovenox    PLAN: Pre-CERT for  acute rehab    Cherrie Madrid, IBETH-CNP    Plan of care was discussed extensively with patient.  Patient verbalized understanding through teach back method.  All question and concerns addressed upon examination.    Of note, this documentation is completed using the Dragon Dictation system (voice recognition software). There may be spelling and/or grammatical errors that were not corrected prior to final submission.

## 2025-02-17 LAB
ANION GAP SERPL CALC-SCNC: 11 MMOL/L (ref 10–20)
BUN SERPL-MCNC: 21 MG/DL (ref 6–23)
CALCIUM SERPL-MCNC: 9 MG/DL (ref 8.6–10.3)
CHLORIDE SERPL-SCNC: 102 MMOL/L (ref 98–107)
CO2 SERPL-SCNC: 26 MMOL/L (ref 21–32)
CREAT SERPL-MCNC: 0.77 MG/DL (ref 0.5–1.3)
EGFRCR SERPLBLD CKD-EPI 2021: >90 ML/MIN/1.73M*2
ERYTHROCYTE [DISTWIDTH] IN BLOOD BY AUTOMATED COUNT: 11.7 % (ref 11.5–14.5)
GLUCOSE BLD MANUAL STRIP-MCNC: 129 MG/DL (ref 74–99)
GLUCOSE BLD MANUAL STRIP-MCNC: 141 MG/DL (ref 74–99)
GLUCOSE BLD MANUAL STRIP-MCNC: 157 MG/DL (ref 74–99)
GLUCOSE BLD MANUAL STRIP-MCNC: 174 MG/DL (ref 74–99)
GLUCOSE SERPL-MCNC: 138 MG/DL (ref 74–99)
HCT VFR BLD AUTO: 35.7 % (ref 41–52)
HGB BLD-MCNC: 12.1 G/DL (ref 13.5–17.5)
HOLD SPECIMEN: NORMAL
MCH RBC QN AUTO: 30.9 PG (ref 26–34)
MCHC RBC AUTO-ENTMCNC: 33.9 G/DL (ref 32–36)
MCV RBC AUTO: 91 FL (ref 80–100)
NRBC BLD-RTO: 0 /100 WBCS (ref 0–0)
PLATELET # BLD AUTO: 270 X10*3/UL (ref 150–450)
POTASSIUM SERPL-SCNC: 4.2 MMOL/L (ref 3.5–5.3)
RBC # BLD AUTO: 3.91 X10*6/UL (ref 4.5–5.9)
SODIUM SERPL-SCNC: 135 MMOL/L (ref 136–145)
WBC # BLD AUTO: 8.9 X10*3/UL (ref 4.4–11.3)

## 2025-02-17 PROCEDURE — 2500000001 HC RX 250 WO HCPCS SELF ADMINISTERED DRUGS (ALT 637 FOR MEDICARE OP): Performed by: STUDENT IN AN ORGANIZED HEALTH CARE EDUCATION/TRAINING PROGRAM

## 2025-02-17 PROCEDURE — 97530 THERAPEUTIC ACTIVITIES: CPT | Mod: GP,CQ | Performed by: PHYSICAL THERAPY ASSISTANT

## 2025-02-17 PROCEDURE — 99232 SBSQ HOSP IP/OBS MODERATE 35: CPT

## 2025-02-17 PROCEDURE — 2500000001 HC RX 250 WO HCPCS SELF ADMINISTERED DRUGS (ALT 637 FOR MEDICARE OP)

## 2025-02-17 PROCEDURE — 82947 ASSAY GLUCOSE BLOOD QUANT: CPT

## 2025-02-17 PROCEDURE — 2500000002 HC RX 250 W HCPCS SELF ADMINISTERED DRUGS (ALT 637 FOR MEDICARE OP, ALT 636 FOR OP/ED): Performed by: STUDENT IN AN ORGANIZED HEALTH CARE EDUCATION/TRAINING PROGRAM

## 2025-02-17 PROCEDURE — 97530 THERAPEUTIC ACTIVITIES: CPT | Mod: GO

## 2025-02-17 PROCEDURE — 85027 COMPLETE CBC AUTOMATED: CPT

## 2025-02-17 PROCEDURE — G0378 HOSPITAL OBSERVATION PER HR: HCPCS

## 2025-02-17 PROCEDURE — 80048 BASIC METABOLIC PNL TOTAL CA: CPT

## 2025-02-17 PROCEDURE — 36415 COLL VENOUS BLD VENIPUNCTURE: CPT

## 2025-02-17 PROCEDURE — 96372 THER/PROPH/DIAG INJ SC/IM: CPT

## 2025-02-17 PROCEDURE — 96376 TX/PRO/DX INJ SAME DRUG ADON: CPT

## 2025-02-17 PROCEDURE — 2500000004 HC RX 250 GENERAL PHARMACY W/ HCPCS (ALT 636 FOR OP/ED)

## 2025-02-17 PROCEDURE — 2500000004 HC RX 250 GENERAL PHARMACY W/ HCPCS (ALT 636 FOR OP/ED): Performed by: STUDENT IN AN ORGANIZED HEALTH CARE EDUCATION/TRAINING PROGRAM

## 2025-02-17 RX ADMIN — HYDROMORPHONE HYDROCHLORIDE 0.4 MG: 1 INJECTION, SOLUTION INTRAMUSCULAR; INTRAVENOUS; SUBCUTANEOUS at 08:58

## 2025-02-17 RX ADMIN — LOSARTAN POTASSIUM 25 MG: 25 TABLET, FILM COATED ORAL at 09:09

## 2025-02-17 RX ADMIN — ICOSAPENT ETHYL 1 G: 1 CAPSULE, LIQUID FILLED ORAL at 09:08

## 2025-02-17 RX ADMIN — PROPRANOLOL HYDROCHLORIDE 10 MG: 20 TABLET ORAL at 20:36

## 2025-02-17 RX ADMIN — ROSUVASTATIN CALCIUM 10 MG: 10 TABLET, FILM COATED ORAL at 09:09

## 2025-02-17 RX ADMIN — CELECOXIB 200 MG: 100 CAPSULE ORAL at 09:08

## 2025-02-17 RX ADMIN — TRAMADOL HYDROCHLORIDE 25 MG: 50 TABLET, COATED ORAL at 18:50

## 2025-02-17 RX ADMIN — ICOSAPENT ETHYL 1 G: 1 CAPSULE, LIQUID FILLED ORAL at 17:25

## 2025-02-17 RX ADMIN — ENOXAPARIN SODIUM 40 MG: 40 INJECTION SUBCUTANEOUS at 17:26

## 2025-02-17 RX ADMIN — GABAPENTIN 300 MG: 300 CAPSULE ORAL at 20:36

## 2025-02-17 ASSESSMENT — COGNITIVE AND FUNCTIONAL STATUS - GENERAL
WALKING IN HOSPITAL ROOM: A LOT
HELP NEEDED FOR BATHING: A LOT
DRESSING REGULAR LOWER BODY CLOTHING: A LITTLE
MOBILITY SCORE: 15
DAILY ACTIVITIY SCORE: 15
DAILY ACTIVITIY SCORE: 22
MOVING TO AND FROM BED TO CHAIR: A LOT
TURNING FROM BACK TO SIDE WHILE IN FLAT BAD: A LITTLE
HELP NEEDED FOR BATHING: A LITTLE
CLIMB 3 TO 5 STEPS WITH RAILING: A LOT
MOBILITY SCORE: 12
WALKING IN HOSPITAL ROOM: TOTAL
PERSONAL GROOMING: A LITTLE
DRESSING REGULAR LOWER BODY CLOTHING: A LITTLE
STANDING UP FROM CHAIR USING ARMS: A LOT
STANDING UP FROM CHAIR USING ARMS: A LOT
MOVING FROM LYING ON BACK TO SITTING ON SIDE OF FLAT BED WITH BEDRAILS: A LITTLE
MOVING TO AND FROM BED TO CHAIR: A LOT
CLIMB 3 TO 5 STEPS WITH RAILING: TOTAL
EATING MEALS: A LITTLE
TURNING FROM BACK TO SIDE WHILE IN FLAT BAD: A LITTLE
DRESSING REGULAR UPPER BODY CLOTHING: A LITTLE
TOILETING: TOTAL

## 2025-02-17 ASSESSMENT — PAIN SCALES - GENERAL
PAINLEVEL_OUTOF10: 7
PAINLEVEL_OUTOF10: 8
PAINLEVEL_OUTOF10: 5 - MODERATE PAIN
PAINLEVEL_OUTOF10: 7
PAINLEVEL_OUTOF10: 0 - NO PAIN

## 2025-02-17 ASSESSMENT — PAIN DESCRIPTION - LOCATION: LOCATION: LEG

## 2025-02-17 ASSESSMENT — PAIN DESCRIPTION - ORIENTATION: ORIENTATION: RIGHT

## 2025-02-17 ASSESSMENT — PAIN - FUNCTIONAL ASSESSMENT: PAIN_FUNCTIONAL_ASSESSMENT: 0-10

## 2025-02-17 NOTE — PROGRESS NOTES
Occupational Therapy    Treatment    Patient Name: Nabor Baldwin  MRN: [id number]  : 1950  Today's Date: 25  Time Calculation  Start Time: 839  Stop Time: 901  Time Calculation (min): 22 min     906/906-A    Assessment:  OT Assessment: pt demo improvement in bed mobility completion this date and improvement in ability to tolerate R knee extension in bed. Pt demo increased indep with LB dressing from bed level. Remains at max A for transfers and is uanble to tolerate resting R foot on floor or Wbing through R LE. Will benefit from con't skilled OT  Prognosis: Good  Barriers to Discharge Home: Caregiver assistance, Physical needs  Caregiver Assistance: Caregiver assistance needed per identified barriers - however, level of patient's required assistance exceeds assistance available at home  Physical Needs: 24hr mobility assistance needed, 24hr ADL assistance needed, High falls risk due to function or environment  Evaluation/Treatment Tolerance: Patient limited by pain  End of Session Patient Position: Up in chair, Alarm off, caregiver present (recliner, legs elevated, ice to R hamstring, call light in reach, nurse and PA present with pt)  OT Assessment Results: Decreased ADL status, Decreased endurance, Decreased functional mobility  Prognosis: Good  Evaluation/Treatment Tolerance: Patient limited by pain    Plan:  Treatment Interventions: ADL retraining, Functional transfer training, UE strengthening/ROM, Endurance training, Patient/family training  OT Frequency: 2 times per week  OT Discharge Recommendations: Moderate intensity level of continued care, High intensity level of continued care  OT Recommended Transfer Status: Maximum assist, Assist of 1 (gait belt, ww)  OT - OK to Discharge: Yes  Treatment Interventions: ADL retraining, Functional transfer training, UE strengthening/ROM, Endurance training, Patient/family training  Subjective     Current Problem:  1. Muscle strain of right thigh,  initial encounter        2. Inability to walk        3. Partial hamstring tear, initial encounter  traMADol 25 mg tablet    Referral to Physical Therapy    Referral to Occupational Therapy        Past Medical History:   Diagnosis Date    Male erectile dysfunction, unspecified     Erectile dysfunction    Osteoarthritis of knee, unspecified     Osteoarthritis of knee    Personal history of other diseases of the circulatory system     History of essential hypertension    Personal history of other diseases of the digestive system     History of esophageal reflux    Personal history of other diseases of the musculoskeletal system and connective tissue     History of arthritis    Unspecified visual loss     Vision problem     Past Surgical History:   Procedure Laterality Date    APPENDECTOMY  08/21/2015    Appendectomy    CATARACT EXTRACTION  08/21/2015    Cataract Surgery    OTHER SURGICAL HISTORY  08/21/2015    Repair Of Retinal Detachment Right Eye    SPINE SURGERY  09/21/2023    L2-5 decompression (discectomy/laminectomy) at [facility] in [place], [place] on 9/21/23    TOTAL KNEE ARTHROPLASTY  08/21/2015    Knee Replacement    VASECTOMY  08/21/2015    Surgery Vas Deferens Vasectomy       General:   OT Received On: 02/17/25  General  Reason for Referral: ADL impairment  Referred By: [facility] eval and tx 2/13/25  Past Medical History Relevant to Rehab: includes: CPAP, BRIGITTE, dyslipidemia, HTN, DM, appey, R TKA, spine surgery, R foot drop  Family/Caregiver Present: No  Co-Treatment: PT  Co-Treatment Reason: to maximize pt/staff safety  Patient Position Received: Bed, 2 rail up, Alarm off, not on at start of session (R knee extended in bed, pt tolerating well)  General Comment: Pt motivated to increase indep with ADLs and functional mobility, demo active participation with OT tx this date    Precautions:  LE Weight Bearing Status:  (WBAT R LE)  Medical Precautions: Fall precautions           Pain:  Pain Assessment  Pain Assessment:  0-10  0-10 (Numeric) Pain Score: 8  Pain Type: Acute pain  Pain Location:  (thigh)  Pain Orientation: Right  Pain Interventions: Cold applied (pt medicated by RN with dilaudid at completion of tx)  Objective     Cognition:  Overall Cognitive Status: Within Functional Limits             Home Living:  Home Living Comments: lives with spouse, 2 story home, bed and full bath on 2nd floor iwth HR. Pt has recliner and 1/2 bath on 1st floor. Walk in shower, no seat or grab bar. Pt has 3 +1 JUANJO with HR on L going into home    Prior Function:  Prior Function Comments: per pt indep iwth ADLS, shares IADLS iwth spouse, indep with med mgmt, owns ww and amb with cane prior to adm. Has AFO for R LE, foot drop with increased plantar flexion tone noted. Reports one fall.           Activities of Daily Living:   Eating Assistance: Independent  Grooming Assistance: Stand by  Grooming Deficit: Setup (seated)  Bathing Assistance: Moderate  UE Dressing Assistance: Stand by  LE Dressing Assistance: Maximal  LE Dressing Deficit:  (don/doff R AFO)  Toileting Assistance with Device: Minimal  Functional Assistance:  (pt ambulated 3' with mod A, able to hop with use of ww going to L side. Pt unable to tolerate putting weight down through R LE)        UE Bathing  UE Bathing Level of Assistance: Independent        LE Dressing  LE Dressing:  (pt participated in LB dressing, donned pants in long sit postion in bed with SBA, able to bridge hips to pull up pants while in bed. Pt mod A to don R AFO/shoe. Pt doffed/isaías L sock and shoe with SBA)       Activity Tolerance:  Endurance: Decreased tolerance for upright activites           Bed Mobility/Transfers: Bed Mobility  Bed Mobility:  (sup to sit SBA with HOB at 50)  Transfers  Transfer:  (sit to stand at EOB with bed height elevated max A  x 1, use of gait belt, vc's to push up from bed with B hands. Bed to chair with ww max A x 1. Pt remains unable to tolerate any weight through R LE, demo  difficulty resting R foot on floor in standing)                  Balance:   Dyn sitting fair +  Static std fair -  Dyn std poor                     Vision:Vision - Basic Assessment  Current Vision: Wears glasses all the time        Sensation:  Light Touch:  (pt reports neuropathy B LE, sensation to light touch B hands intact)    Strength:  Strength Comments: B UE 4/5 throughout        Extremities: RUE   RUE : Within Functional Limits and LUE   LUE: Within Functional Limits    Outcome Measures: [facility] Daily Activity  Putting on and taking off regular lower body clothing: A little  Bathing (including washing, rinsing, drying): A lot  Putting on and taking off regular upper body clothing: A little  Toileting, which includes using toilet, bedpan or urinal: Total  Taking care of personal grooming such as brushing teeth: A little  Eating Meals: A little  Daily Activity - Total Score: 15    Education Documentation  Precautions, taught by Ana Maria De Anda OT at 2/17/2025  9:23 AM.  Learner: Patient  Readiness: Acceptance  Method: Explanation  Response: Verbalizes Understanding, Needs Reinforcement    ADL Training, taught by Ana Maria De Anda OT at 2/17/2025  9:23 AM.  Learner: Patient  Readiness: Acceptance  Method: Explanation  Response: Verbalizes Understanding, Needs Reinforcement    Education Comments  No comments found.        EDUCATION:  Education  Education Comment: OT educated pt on LB dressing strategies and safe transfers. Pt receptive to education, demo understanding, remains at [place] A for transfesr, will benefit from con't skilled OT to address impairments    Goals:  Encounter Problems       Encounter Problems (Active)       OT Goals       pt will dress LB with min A (Progressing)       Start:  02/14/25    Expected End:  02/28/25            Pt will bathe/dress UB with SBA (Progressing)       Start:  02/14/25    Expected End:  02/28/25            Pt will transfer to bed, chair, toilet with CGA (Not Progressing)        Start:  02/14/25    Expected End:  02/28/25            Pt will tolerate 10 minutes of functional activity with one rest break. (Progressing)       Start:  02/14/25    Expected End:  02/28/25

## 2025-02-17 NOTE — PROGRESS NOTES
"Daily Progress Note    Nabor Baldwin is a 74 y.o. male on day 0 of admission presenting with Partial hamstring tear, initial encounter.    Subjective   Patient seen and examined, labs and vitals reviewed. Patient states he feels well today, has noticed improvement in his right hamstring movement and pain. Rated 7/10 after working with OT. Denies CP, SOB, abd pain, n/v/d. Patient is medically stable for discharge, was placed 2/14 but are still awaiting pre-cert. Patient can be discharged to [facility] once pre-cert is obtained. Will be given tramadol prn for moderate to severe pain.       Objective   Physical Exam  Constitutional:       Appearance: He is obese.   HENT:      Head: Normocephalic.      Mouth/Throat:      Mouth: Mucous membranes are moist.   Eyes:      Pupils: Pupils are equal, round, and reactive to light.   Cardiovascular:      Rate and Rhythm: Normal rate and regular rhythm.      Heart sounds: Normal heart sounds, S1 normal and S2 normal.   Pulmonary:      Effort: Pulmonary effort is normal.      Breath sounds: Normal breath sounds.   Abdominal:      General: Bowel sounds are normal.      Palpations: Abdomen is soft.   Musculoskeletal:      Cervical back: Neck supple.      Left lower leg: No edema.      Right foot: Decreased range of motion. Foot drop present.      Comments: Right upper leg with ACE wrap. Trace RLE edema   Skin:     General: Skin is warm.   Neurological:      Mental Status: He is alert and oriented to person, place, and time.      Motor: Weakness present.   Psychiatric:         Mood and Affect: Mood normal.         Behavior: Behavior normal.         Last Recorded Vitals  Blood pressure 111/64, pulse 86, temperature 36.5 °C (97.7 °F), temperature source Temporal, resp. rate 16, height 1.727 m (5' 8\"), weight 90.7 kg (200 lb), SpO2 95%.  Intake/Output last 3 Shifts:  I/O last 3 completed shifts:  In: - (0 mL/kg)   Out: 200 (2.2 mL/kg) [Urine:200 (0.1 mL/kg/hr)]  Weight: 90.7 kg "     Medications  Scheduled medications  celecoxib, 200 mg, oral, Daily  enoxaparin, 40 mg, subcutaneous, q24h  gabapentin, 300 mg, oral, Nightly  icosapent ethyL, 1 g, oral, BID  insulin lispro, 0-5 Units, subcutaneous, TID AC  losartan, 25 mg, oral, Daily  propranolol, 10 mg, oral, Nightly  rosuvastatin, 10 mg, oral, Daily      Continuous medications     PRN medications  PRN medications: acetaminophen **OR** acetaminophen **OR** acetaminophen, HYDROmorphone, ondansetron **OR** ondansetron, polyethylene glycol, traMADol    Labs  CBC:   Results from last 7 days   Lab Units 02/17/25  0544 02/16/25  0628 02/15/25  0715   WBC AUTO x10*3/uL 8.9 8.3 8.8   RBC AUTO x10*6/uL 3.91* 3.83* 3.93*   HEMOGLOBIN g/dL 12.1* 11.8* 12.2*   HEMATOCRIT % 35.7* 35.3* 36.2*   MCV fL 91 92 92   MCH pg 30.9 30.8 31.0   MCHC g/dL 33.9 33.4 33.7   RDW % 11.7 11.9 11.8   PLATELETS AUTO x10*3/uL 270 250 244     CMP:    Results from last 7 days   Lab Units 02/17/25  0544 02/16/25  0628 02/15/25  0715   SODIUM mmol/L 135* 137 135*   POTASSIUM mmol/L 4.2 4.3 4.0   CHLORIDE mmol/L 102 101 100   CO2 mmol/L 26 30 28   BUN mg/dL 21 21 21   CREATININE mg/dL 0.77 0.86 0.91   GLUCOSE mg/dL 138* 128* 121*   CALCIUM mg/dL 9.0 8.9 9.1     BMP:    Results from last 7 days   Lab Units 02/17/25  0544 02/16/25  0628 02/15/25  0715   SODIUM mmol/L 135* 137 135*   POTASSIUM mmol/L 4.2 4.3 4.0   CHLORIDE mmol/L 102 101 100   CO2 mmol/L 26 30 28   BUN mg/dL 21 21 21   CREATININE mg/dL 0.77 0.86 0.91   CALCIUM mg/dL 9.0 8.9 9.1   GLUCOSE mg/dL 138* 128* 121*     Magnesium:    Troponin:      BNP:     Lipid Panel:         Nutrition             Relevant Results  Results from last 7 days   Lab Units 02/17/25  0544 02/17/25  0510 02/16/25  1927 02/16/25  1603 02/16/25  1050 02/16/25  0628 02/16/25  0526 02/15/25  1056 02/15/25  0715 02/14/25  0552 02/14/25  0535   POCT GLUCOSE mg/dL  --  129* 161* 144* 149*  --  134*   < >  --    < >  --    GLUCOSE mg/dL 138*  --   --    --   --  128*  --   --  121*  --  123*    < > = values in this interval not displayed.     Lab Results   Component Value Date    HGBA1C 7.0 (H) 12/30/2024        Assessment/Plan    Mechanical fall  Right leg pain  Right foot drop s/p lumbar surgery  Complete/partial right hamstring tear   HTN/HLD  T2DM not requiring insulin  Lumbar spinal stenosis surgery 2022  BRIGITTE uses a CPAP at night  BPH  Palpitations with PVCs on a beta-blocker at night    -Imaging negative for fracture  -Consult orthopedics, no intervention at this time, weightbearing as tolerated  -MRI shows complete/partial tendon tear  -Ace bandage to right upper thigh  -Ice to affected area as needed  -Optimize pain control: tylenol, dilaudid, tramadol  -A1c 7.0% on 12/2024  -Diabetic diet with Accu-Cheks and sliding scale  -Hold home Metformin and ozempic  -Continue home meds  -CBC and BMP daily  -CPAP at night  -PT/OT eval and treat  -TCC for discharge planning      DVTp: lovenox  PLAN: medically stable for discharge once pre-cert obtained    Vivian Badillo PA-C    Plan of care was discussed extensively with patient.  Patient verbalized understanding through teach back method.  All question and concerns addressed upon examination.    Of note, this documentation is completed using the Dragon Dictation system (voice recognition software). There may be spelling and/or grammatical errors that were not corrected prior to final submission.

## 2025-02-17 NOTE — PROGRESS NOTES
Physical Therapy    Physical Therapy Treatment    Patient Name: Nabor Baldwin  MRN: [id number]  Today's Date: 2/17/2025  Time Calculation  Start Time: 0230  Stop Time: 0302  Time Calculation (min): 32 min     906/906-A    Assessment/Plan   PT Assessment  PT Assessment Results: Decreased strength, Decreased range of motion, Decreased endurance, Impaired balance, Decreased mobility, Pain  Rehab Prognosis: Good  Barriers to Discharge Home: Caregiver assistance, Physical needs  Caregiver Assistance: Caregiver assistance needed per identified barriers - however, level of patient's required assistance exceeds assistance available at home  Physical Needs: Stair navigation into home limited by function/safety, High falls risk due to function or environment, Ambulating household distances limited by function/safety, In-home setup navigation limited by function/safety  Evaluation/Treatment Tolerance: Patient limited by pain, Patient limited by fatigue  Medical Staff Made Aware: Yes  Strengths: Living arrangement secure  Barriers to Participation: Comorbidities  End of Session Communication: Bedside nurse  Assessment Comment: Patient presents with good effort throughout todays session. Very motivated and willing to participate in all tasks however significant pain with functional mobility limiting tolerance to activity this date. Patient is unable to tolerate WBing through RLE this session, significant education provided on importance of positional changes and extending RLE to best ability. Patient would benefit from continued therapy to improve RLE ROM, reduce pain and improve functional mobility. Call light and all needs in reach.  End of Session Patient Position: Bed, 3 rail up  PT Plan  Inpatient/Swing Bed or Outpatient: Inpatient  PT Plan  Treatment/Interventions: Bed mobility, Transfer training, Gait training, Balance training, Therapeutic activity, Positioning  PT Plan: Ongoing PT  PT Frequency: 4 times per  week    General Visit Information:   PT  Visit  PT Received On: 02/17/25  General  Prior to Session Communication: Bedside nurse  Patient Position Received: Up in chair  General Comment:  (Pt. requesting ace wrap around thigh be reinforced and nursing in after tx. to provide.)    Precautions:  Precautions  LE Weight Bearing Status: Weight Bearing as Tolerated (RLE with AFO applied)  Medical Precautions: Fall precautions  Braces Applied:  (right LE AFO)  Precautions Comment:  (Fall)    Pain:  Pain Assessment  0-10 (Numeric) Pain Score: 5 - Moderate pain  Pain Interventions: Cold applied    Treatments:  Therapeutic Exercise  Therapeutic Exercise Performed: Yes (laq's, hip flexion in sit and hip flexion in stand with knee extensions x 5 reps with fww support)  Therapeutic Activity  Therapeutic Activity Performed: Yes (seated edge of bed scoot to head of bed with education and emphasis on accepting weight thru both LE's to promote wbat /weight acceptance RLE  stating seated position easier than standing appearing to accept some weight.with task.)     Ambulation/Gait Training  Ambulation/Gait Training Performed: Yes (standing with fww weight shifting with mod x 1 and emphasis on extending RLE with foot flat to accept weight but pt. unable to perform, right knee goes into flexion, hip into flexion and extension avoiding foot flat positiong, hops via NWB RLE with fww)  Transfers  Transfer: Yes (sit to stand from recliner with mod x 1, 4 hops negotiating to bsc unable to WBAT RLE secondary to pain complaints, sit to stand from bsc with min x 1 , bsc to bed with fww and mod x 1  2 hops, sit to supine with cg)     Outcome Measures:     [facility] Basic Mobility  Turning from your back to your side while in a flat bed without using bedrails: A little  Moving from lying on your back to sitting on the side of a flat bed without using bedrails: A little  Moving to and from bed to chair (including a wheelchair): A lot  Standing up from  a chair using your arms (e.g. wheelchair or bedside chair): A lot  To walk in hospital room: Total  Climbing 3-5 steps with railing: Total  Basic Mobility - Total Score: 12      Education Documentation  Mobility Training, taught by Loyda Saucedo PTA at 2/17/2025  3:27 PM.  Learner: Patient  Readiness: Acceptance  Method: Explanation, Demonstration, Teach-back  Response: Demonstrated Understanding, Needs Reinforcement    Precautions, taught by Loyda Saucedo PTA at 2/17/2025  3:27 PM.  Learner: Patient  Readiness: Acceptance  Method: Explanation, Demonstration, Teach-back  Response: Demonstrated Understanding, Needs Reinforcement    ADL Training, taught by Loyda Saucedo PTA at 2/17/2025  3:27 PM.  Learner: Patient  Readiness: Acceptance  Method: Explanation, Demonstration, Teach-back  Response: Demonstrated Understanding, Needs Reinforcement    Education Comments  No comments found.       EDUCATION:  Outpatient Education  Individual(s) Educated: Patient  Education Provided: Body Mechanics, Posture  Patient Response to Education: Patient/Caregiver Verbalized Understanding of Information  Education Comment:  (pt. impulsive with poor safety awareness requires verbal and tactile instructions for safe transfer execution. heightened anxiety with functional tasks due to fear of illiciting increased pain RLE)  Encounter Problems       Encounter Problems (Active)       PT Problem       Pt. will transfer supine/sit with MOD A x 1 (Progressing)       Start:  02/14/25    Expected End:  02/28/25            Pt. will transfer sit/stand with FWW with MIN A x 1 (Progressing)       Start:  02/14/25    Expected End:  02/28/25            Pt.will ambulate 25' with FWW with MIN A x 1 (Progressing)       Start:  02/14/25    Expected End:  02/28/25            Pt. will perform 2 x 15 B LE AROM exercises  (Progressing)       Start:  02/14/25    Expected End:  02/28/25

## 2025-02-17 NOTE — PROGRESS NOTES
Updated notes sent this morning per  precert team to insurance. Precert remaining pending. Spouse and pt. Were updated.   Requested  team escalate precert.

## 2025-02-17 NOTE — CARE PLAN
The patient's goals for the shift include      The clinical goals for the shift include pain control    Over the shift, the patient did not make progress toward the following goals. Barriers to progression include understanding poc. Recommendations to address these barriers include review poc.

## 2025-02-18 LAB
ANION GAP SERPL CALC-SCNC: 11 MMOL/L (ref 10–20)
BUN SERPL-MCNC: 20 MG/DL (ref 6–23)
CALCIUM SERPL-MCNC: 9 MG/DL (ref 8.6–10.3)
CHLORIDE SERPL-SCNC: 100 MMOL/L (ref 98–107)
CO2 SERPL-SCNC: 29 MMOL/L (ref 21–32)
CREAT SERPL-MCNC: 0.76 MG/DL (ref 0.5–1.3)
EGFRCR SERPLBLD CKD-EPI 2021: >90 ML/MIN/1.73M*2
ERYTHROCYTE [DISTWIDTH] IN BLOOD BY AUTOMATED COUNT: 11.9 % (ref 11.5–14.5)
GLUCOSE BLD MANUAL STRIP-MCNC: 131 MG/DL (ref 74–99)
GLUCOSE BLD MANUAL STRIP-MCNC: 133 MG/DL (ref 74–99)
GLUCOSE BLD MANUAL STRIP-MCNC: 135 MG/DL (ref 74–99)
GLUCOSE BLD MANUAL STRIP-MCNC: 154 MG/DL (ref 74–99)
GLUCOSE SERPL-MCNC: 142 MG/DL (ref 74–99)
HCT VFR BLD AUTO: 35.8 % (ref 41–52)
HGB BLD-MCNC: 12.2 G/DL (ref 13.5–17.5)
HOLD SPECIMEN: NORMAL
MCH RBC QN AUTO: 31.1 PG (ref 26–34)
MCHC RBC AUTO-ENTMCNC: 34.1 G/DL (ref 32–36)
MCV RBC AUTO: 91 FL (ref 80–100)
NRBC BLD-RTO: 0 /100 WBCS (ref 0–0)
PLATELET # BLD AUTO: 270 X10*3/UL (ref 150–450)
POTASSIUM SERPL-SCNC: 4.2 MMOL/L (ref 3.5–5.3)
RBC # BLD AUTO: 3.92 X10*6/UL (ref 4.5–5.9)
SODIUM SERPL-SCNC: 136 MMOL/L (ref 136–145)
WBC # BLD AUTO: 7.2 X10*3/UL (ref 4.4–11.3)

## 2025-02-18 PROCEDURE — 36415 COLL VENOUS BLD VENIPUNCTURE: CPT

## 2025-02-18 PROCEDURE — 80048 BASIC METABOLIC PNL TOTAL CA: CPT

## 2025-02-18 PROCEDURE — 97530 THERAPEUTIC ACTIVITIES: CPT | Mod: GP,CQ | Performed by: PHYSICAL THERAPY ASSISTANT

## 2025-02-18 PROCEDURE — 96372 THER/PROPH/DIAG INJ SC/IM: CPT | Mod: 59

## 2025-02-18 PROCEDURE — 2500000004 HC RX 250 GENERAL PHARMACY W/ HCPCS (ALT 636 FOR OP/ED)

## 2025-02-18 PROCEDURE — 2500000001 HC RX 250 WO HCPCS SELF ADMINISTERED DRUGS (ALT 637 FOR MEDICARE OP): Performed by: STUDENT IN AN ORGANIZED HEALTH CARE EDUCATION/TRAINING PROGRAM

## 2025-02-18 PROCEDURE — 99232 SBSQ HOSP IP/OBS MODERATE 35: CPT

## 2025-02-18 PROCEDURE — 85027 COMPLETE CBC AUTOMATED: CPT

## 2025-02-18 PROCEDURE — 2500000002 HC RX 250 W HCPCS SELF ADMINISTERED DRUGS (ALT 637 FOR MEDICARE OP, ALT 636 FOR OP/ED): Performed by: STUDENT IN AN ORGANIZED HEALTH CARE EDUCATION/TRAINING PROGRAM

## 2025-02-18 PROCEDURE — G0378 HOSPITAL OBSERVATION PER HR: HCPCS

## 2025-02-18 PROCEDURE — 82947 ASSAY GLUCOSE BLOOD QUANT: CPT

## 2025-02-18 PROCEDURE — 2500000001 HC RX 250 WO HCPCS SELF ADMINISTERED DRUGS (ALT 637 FOR MEDICARE OP)

## 2025-02-18 RX ADMIN — ROSUVASTATIN CALCIUM 10 MG: 10 TABLET, FILM COATED ORAL at 08:15

## 2025-02-18 RX ADMIN — CELECOXIB 200 MG: 100 CAPSULE ORAL at 08:15

## 2025-02-18 RX ADMIN — ENOXAPARIN SODIUM 40 MG: 40 INJECTION SUBCUTANEOUS at 16:31

## 2025-02-18 RX ADMIN — PROPRANOLOL HYDROCHLORIDE 10 MG: 20 TABLET ORAL at 20:42

## 2025-02-18 RX ADMIN — TRAMADOL HYDROCHLORIDE 25 MG: 50 TABLET, COATED ORAL at 08:15

## 2025-02-18 RX ADMIN — LOSARTAN POTASSIUM 25 MG: 25 TABLET, FILM COATED ORAL at 08:15

## 2025-02-18 RX ADMIN — ICOSAPENT ETHYL 1 G: 1 CAPSULE, LIQUID FILLED ORAL at 08:15

## 2025-02-18 RX ADMIN — ICOSAPENT ETHYL 1 G: 1 CAPSULE, LIQUID FILLED ORAL at 16:31

## 2025-02-18 RX ADMIN — TRAMADOL HYDROCHLORIDE 25 MG: 50 TABLET, COATED ORAL at 18:32

## 2025-02-18 RX ADMIN — GABAPENTIN 300 MG: 300 CAPSULE ORAL at 20:44

## 2025-02-18 ASSESSMENT — COGNITIVE AND FUNCTIONAL STATUS - GENERAL
WALKING IN HOSPITAL ROOM: A LOT
MOBILITY SCORE: 13
CLIMB 3 TO 5 STEPS WITH RAILING: A LOT
STANDING UP FROM CHAIR USING ARMS: A LOT
STANDING UP FROM CHAIR USING ARMS: A LOT
DAILY ACTIVITIY SCORE: 22
MOVING TO AND FROM BED TO CHAIR: A LOT
STANDING UP FROM CHAIR USING ARMS: A LITTLE
MOVING FROM LYING ON BACK TO SITTING ON SIDE OF FLAT BED WITH BEDRAILS: A LITTLE
CLIMB 3 TO 5 STEPS WITH RAILING: A LOT
HELP NEEDED FOR BATHING: A LITTLE
MOBILITY SCORE: 15
HELP NEEDED FOR BATHING: A LITTLE
MOVING TO AND FROM BED TO CHAIR: A LOT
DRESSING REGULAR LOWER BODY CLOTHING: A LITTLE
WALKING IN HOSPITAL ROOM: TOTAL
TURNING FROM BACK TO SIDE WHILE IN FLAT BAD: A LITTLE
DRESSING REGULAR LOWER BODY CLOTHING: A LITTLE
WALKING IN HOSPITAL ROOM: A LOT
MOVING TO AND FROM BED TO CHAIR: A LOT
MOBILITY SCORE: 15
CLIMB 3 TO 5 STEPS WITH RAILING: TOTAL
DAILY ACTIVITIY SCORE: 22
TURNING FROM BACK TO SIDE WHILE IN FLAT BAD: A LITTLE
TURNING FROM BACK TO SIDE WHILE IN FLAT BAD: A LITTLE

## 2025-02-18 ASSESSMENT — PAIN SCALES - GENERAL
PAINLEVEL_OUTOF10: 3
PAINLEVEL_OUTOF10: 0 - NO PAIN
PAINLEVEL_OUTOF10: 0 - NO PAIN
PAINLEVEL_OUTOF10: 5 - MODERATE PAIN
PAINLEVEL_OUTOF10: 7

## 2025-02-18 ASSESSMENT — PAIN - FUNCTIONAL ASSESSMENT
PAIN_FUNCTIONAL_ASSESSMENT: 0-10

## 2025-02-18 ASSESSMENT — PAIN DESCRIPTION - LOCATION: LOCATION: LEG

## 2025-02-18 NOTE — PROGRESS NOTES
Precert is pending.  Escalated 2/17. Pt updated.   Pt denied Acute rehab. P2P completed by shelton Gomez. Pt's snf choices are 1. [facility]. 2. [facility] 3. [facility]. Referral sent. Once accepted will have  team start precert. RICHELLE Bocanegra and SHELTON updated.  precert team was updated.

## 2025-02-18 NOTE — PROGRESS NOTES
"    Physical Therapy    Physical Therapy Treatment    Patient Name: Nabor Baldwin  MRN: [id number]  Today's Date: 2/18/2025  Time Calculation  Start Time: 1125  Stop Time: 1143  Time Calculation (min): 18 min     906/906-[id number]      General Visit Information:   PT  Visit  PT Received On: 02/18/25  General  Prior to Session Communication: Bedside nurse  Patient Position Received: Up in chair  General Comment:  (Pt. agreeable to therapy stating \"I want to get out of here\" awaiting discharge to rehab.)    Precautions:  Precautions  LE Weight Bearing Status: Weight Bearing as Tolerated (RLE with AFO applied)  Medical Precautions: Fall precautions  Braces Applied:  (right LE AFO)  Precautions Comment:  (fall , RLE WBAT)    Treatments:  Therapeutic Exercise  Therapeutic Exercise Performed: Yes (standing with fww support performng, rle hip flexions, extensions and abductions with rest break increments, x 10 reps of exercises)      Transfers  Transfer: Yes (supine to sit with cg and rail, sit to stand trials with min x 1, standing with fww support with min x 1 improved control no impulsiveness voicing less pain and displaying ability to attain contact with rle on floor but no weight acceptance)     Other Activity  Other Activity Performed: Yes (standing pre gait activity appearing more controlled today, no impusivity noted educated to perform step to/retreats with RLE gradually accepting weight on right foot, side step/retreats with min x 1 support 4 steps bed to chair min x 1)    Outcome Measures:     [facility] Basic Mobility  Turning from your back to your side while in a flat bed without using bedrails: A little  Moving from lying on your back to sitting on the side of a flat bed without using bedrails: A little  Moving to and from bed to chair (including a wheelchair): A lot  Standing up from a chair using your arms (e.g. wheelchair or bedside chair): A little  To walk in hospital room: Total  Climbing 3-5 steps with railing: " Total  Basic Mobility - Total Score: 13     Education Documentation  Mobility Training, taught by Loyda Saucedo PTA at 2/18/2025  1:43 PM.  Learner: Patient  Readiness: Acceptance  Method: Explanation, Demonstration, Teach-back  Response: Demonstrated Understanding, Needs Reinforcement    Precautions, taught by Loyda Saucedo PTA at 2/18/2025  1:43 PM.  Learner: Patient  Readiness: Acceptance  Method: Explanation, Demonstration, Teach-back  Response: Demonstrated Understanding, Needs Reinforcement    ADL Training, taught by Loyda Saucedo PTA at 2/18/2025  1:43 PM.  Learner: Patient  Readiness: Acceptance  Method: Explanation, Demonstration, Teach-back  Response: Demonstrated Understanding, Needs Reinforcement    Mobility Training, taught by Loyda Saucedo PTA at 2/17/2025  3:27 PM.  Learner: Patient  Readiness: Acceptance  Method: Explanation, Demonstration, Teach-back  Response: Demonstrated Understanding, Needs Reinforcement    Precautions, taught by Loyda Saucedo PTA at 2/17/2025  3:27 PM.  Learner: Patient  Readiness: Acceptance  Method: Explanation, Demonstration, Teach-back  Response: Demonstrated Understanding, Needs Reinforcement    ADL Training, taught by Loyda Saucedo PTA at 2/17/2025  3:27 PM.  Learner: Patient  Readiness: Acceptance  Method: Explanation, Demonstration, Teach-back  Response: Demonstrated Understanding, Needs Reinforcement    Education Comments  No comments found.       EDUCATION:  Outpatient Education  Individual(s) Educated: Patient  Education Provided: Body Mechanics, Posture  Patient Response to Education: Patient/Caregiver Verbalized Understanding of Information, Patient/Caregiver Performed Return Demonstration of Exercises/Activities  Education Comment:  (emphasis on standing with fww support with education to gradually increase RLE WBAT with foot flat on floor performing better today but hesitant to accept full weight still.)  Encounter Problems       Encounter Problems (Active)        PT Problem       Pt. will transfer supine/sit with MOD A x 1 (Progressing)       Start:  02/14/25    Expected End:  02/28/25            Pt. will transfer sit/stand with FWW with MIN A x 1 (Progressing)       Start:  02/14/25    Expected End:  02/28/25            Pt.will ambulate 25' with FWW with MIN A x 1 (Progressing)       Start:  02/14/25    Expected End:  02/28/25            Pt. will perform 2 x 15 B LE AROM exercises  (Progressing)       Start:  02/14/25    Expected End:  02/28/25

## 2025-02-18 NOTE — PROGRESS NOTES
"Daily Progress Note    Nabor Baldwin is a 74 y.o. male on day 0 of admission presenting with Partial hamstring tear, initial encounter.    Subjective   Patient seen and examined, labs and vitals reviewed.  Patient feels well today and is ready for discharge.  Pain has gradually been improving.  Denies chest pain, shortness of breath, abdominal pain, nausea, vomiting, diarrhea. Patient is medically stable for discharge, was placed 2/14 but are still awaiting pre-cert. Patient can be discharged to [facility] once pre-cert is obtained. Will be given tramadol prn for moderate to severe pain.        Objective     Physical Exam  Constitutional:       Appearance: Normal appearance. He is obese.   HENT:      Head: Normocephalic.      Mouth/Throat:      Mouth: Mucous membranes are moist.   Eyes:      Pupils: Pupils are equal, round, and reactive to light.   Cardiovascular:      Rate and Rhythm: Normal rate and regular rhythm.      Heart sounds: Normal heart sounds, S1 normal and S2 normal.   Pulmonary:      Effort: Pulmonary effort is normal.      Breath sounds: Normal breath sounds.   Abdominal:      General: Bowel sounds are normal.      Palpations: Abdomen is soft.   Musculoskeletal:      Cervical back: Neck supple.      Right foot: Decreased range of motion. Foot drop present.      Comments: Right upper leg with ACE wrap   Skin:     General: Skin is warm.   Neurological:      Mental Status: He is alert and oriented to person, place, and time.      Motor: Weakness present.   Psychiatric:         Mood and Affect: Mood normal.         Behavior: Behavior normal.         Last Recorded Vitals  Blood pressure 130/66, pulse 79, temperature 36.2 °C (97.2 °F), resp. rate 16, height 1.727 m (5' 8\"), weight 90.7 kg (200 lb), SpO2 93%.  Intake/Output last 3 Shifts:  I/O last 3 completed shifts:  In: 240 (2.6 mL/kg) [P.O.:240]  Out: 200 (2.2 mL/kg) [Urine:200 (0.1 mL/kg/hr)]  Weight: 90.7 kg     Medications  Scheduled " medications  celecoxib, 200 mg, oral, Daily  enoxaparin, 40 mg, subcutaneous, q24h  gabapentin, 300 mg, oral, Nightly  icosapent ethyL, 1 g, oral, BID  insulin lispro, 0-5 Units, subcutaneous, TID AC  losartan, 25 mg, oral, Daily  propranolol, 10 mg, oral, Nightly  rosuvastatin, 10 mg, oral, Daily      Continuous medications     PRN medications  PRN medications: acetaminophen **OR** acetaminophen **OR** acetaminophen, HYDROmorphone, ondansetron **OR** ondansetron, polyethylene glycol, traMADol    Labs  CBC:   Results from last 7 days   Lab Units 02/18/25  0542 02/17/25  0544 02/16/25  0628   WBC AUTO x10*3/uL 7.2 8.9 8.3   RBC AUTO x10*6/uL 3.92* 3.91* 3.83*   HEMOGLOBIN g/dL 12.2* 12.1* 11.8*   HEMATOCRIT % 35.8* 35.7* 35.3*   MCV fL 91 91 92   MCH pg 31.1 30.9 30.8   MCHC g/dL 34.1 33.9 33.4   RDW % 11.9 11.7 11.9   PLATELETS AUTO x10*3/uL 270 270 250     CMP:    Results from last 7 days   Lab Units 02/18/25  0542 02/17/25  0544 02/16/25  0628   SODIUM mmol/L 136 135* 137   POTASSIUM mmol/L 4.2 4.2 4.3   CHLORIDE mmol/L 100 102 101   CO2 mmol/L 29 26 30   BUN mg/dL 20 21 21   CREATININE mg/dL 0.76 0.77 0.86   GLUCOSE mg/dL 142* 138* 128*   CALCIUM mg/dL 9.0 9.0 8.9     BMP:    Results from last 7 days   Lab Units 02/18/25  0542 02/17/25  0544 02/16/25  0628   SODIUM mmol/L 136 135* 137   POTASSIUM mmol/L 4.2 4.2 4.3   CHLORIDE mmol/L 100 102 101   CO2 mmol/L 29 26 30   BUN mg/dL 20 21 21   CREATININE mg/dL 0.76 0.77 0.86   CALCIUM mg/dL 9.0 9.0 8.9   GLUCOSE mg/dL 142* 138* 128*     Magnesium:    Troponin:      BNP:     Lipid Panel:         Nutrition             Relevant Results  Results from last 7 days   Lab Units 02/18/25  1050 02/18/25  0555 02/18/25  0542 02/17/25  2020 02/17/25  1623 02/17/25  1027 02/17/25  0544 02/16/25  1050 02/16/25  0628 02/15/25  1056 02/15/25  0715 02/14/25  0552 02/14/25  0535   POCT GLUCOSE mg/dL 135* 133*  --  141* 174* 157*  --    < >  --    < >  --    < >  --    GLUCOSE mg/dL  --    --  142*  --   --   --  138*  --  128*  --  121*  --  123*    < > = values in this interval not displayed.     Lab Results   Component Value Date    HGBA1C 7.0 (H) 12/30/2024        Assessment/Plan    Mechanical fall  Right leg pain  Right foot drop status post lumbar surgery  HTN/HLD  T2DM  Lumbar spinal stenosis surgery 2022  BRIGITTE uses a CPAP at night  BPH  Palpitations with PVCs on a beta-blocker at night    -Imaging negative for fracture  -Consult orthopedics no intervention at this time weightbearing as tolerated  -MRI shows complete/partial tendon tear  -Ace bandage to upper thigh  -Ice to affected area as needed  -Optimize pain control: Tylenol, Dilaudid, tramadol  -A1c 7.0% on 12/2024   -Diabetic diet with Accu-Chek and sliding scale  -Hold home metformin and Ozempic  -Continue home meds  -CBC and BMP daily  -CPAP at night  -PT/OT evaluation  -TCC for discharge planning      DVTp: Lovenox  PLAN: Medically stable for discharge once pre-CERT obtained    Vivian Badillo PA-C    Plan of care was discussed extensively with patient.  Patient verbalized understanding through teach back method.  All question and concerns addressed upon examination.    Of note, this documentation is completed using the Dragon Dictation system (voice recognition software). There may be spelling and/or grammatical errors that were not corrected prior to final submission.

## 2025-02-19 VITALS
RESPIRATION RATE: 16 BRPM | SYSTOLIC BLOOD PRESSURE: 107 MMHG | TEMPERATURE: 97.7 F | BODY MASS INDEX: 30.31 KG/M2 | WEIGHT: 200 LBS | DIASTOLIC BLOOD PRESSURE: 67 MMHG | HEART RATE: 90 BPM | HEIGHT: 68 IN | OXYGEN SATURATION: 95 %

## 2025-02-19 LAB
ANION GAP SERPL CALC-SCNC: 13 MMOL/L (ref 10–20)
BUN SERPL-MCNC: 22 MG/DL (ref 6–23)
CALCIUM SERPL-MCNC: 9.3 MG/DL (ref 8.6–10.3)
CHLORIDE SERPL-SCNC: 101 MMOL/L (ref 98–107)
CO2 SERPL-SCNC: 27 MMOL/L (ref 21–32)
CREAT SERPL-MCNC: 0.93 MG/DL (ref 0.5–1.3)
EGFRCR SERPLBLD CKD-EPI 2021: 87 ML/MIN/1.73M*2
ERYTHROCYTE [DISTWIDTH] IN BLOOD BY AUTOMATED COUNT: 11.9 % (ref 11.5–14.5)
GLUCOSE BLD MANUAL STRIP-MCNC: 123 MG/DL (ref 74–99)
GLUCOSE BLD MANUAL STRIP-MCNC: 136 MG/DL (ref 74–99)
GLUCOSE BLD MANUAL STRIP-MCNC: 152 MG/DL (ref 74–99)
GLUCOSE BLD MANUAL STRIP-MCNC: 168 MG/DL (ref 74–99)
GLUCOSE SERPL-MCNC: 145 MG/DL (ref 74–99)
HCT VFR BLD AUTO: 37.8 % (ref 41–52)
HGB BLD-MCNC: 12.6 G/DL (ref 13.5–17.5)
HOLD SPECIMEN: NORMAL
MCH RBC QN AUTO: 30.8 PG (ref 26–34)
MCHC RBC AUTO-ENTMCNC: 33.3 G/DL (ref 32–36)
MCV RBC AUTO: 92 FL (ref 80–100)
NRBC BLD-RTO: 0 /100 WBCS (ref 0–0)
PLATELET # BLD AUTO: 321 X10*3/UL (ref 150–450)
POTASSIUM SERPL-SCNC: 4.1 MMOL/L (ref 3.5–5.3)
RBC # BLD AUTO: 4.09 X10*6/UL (ref 4.5–5.9)
SODIUM SERPL-SCNC: 137 MMOL/L (ref 136–145)
WBC # BLD AUTO: 8.5 X10*3/UL (ref 4.4–11.3)

## 2025-02-19 PROCEDURE — 82947 ASSAY GLUCOSE BLOOD QUANT: CPT | Mod: 59

## 2025-02-19 PROCEDURE — 2500000001 HC RX 250 WO HCPCS SELF ADMINISTERED DRUGS (ALT 637 FOR MEDICARE OP)

## 2025-02-19 PROCEDURE — 85027 COMPLETE CBC AUTOMATED: CPT

## 2025-02-19 PROCEDURE — G0378 HOSPITAL OBSERVATION PER HR: HCPCS

## 2025-02-19 PROCEDURE — 99232 SBSQ HOSP IP/OBS MODERATE 35: CPT

## 2025-02-19 PROCEDURE — 80048 BASIC METABOLIC PNL TOTAL CA: CPT

## 2025-02-19 PROCEDURE — 36415 COLL VENOUS BLD VENIPUNCTURE: CPT

## 2025-02-19 PROCEDURE — 2500000002 HC RX 250 W HCPCS SELF ADMINISTERED DRUGS (ALT 637 FOR MEDICARE OP, ALT 636 FOR OP/ED): Performed by: STUDENT IN AN ORGANIZED HEALTH CARE EDUCATION/TRAINING PROGRAM

## 2025-02-19 PROCEDURE — 2500000001 HC RX 250 WO HCPCS SELF ADMINISTERED DRUGS (ALT 637 FOR MEDICARE OP): Performed by: STUDENT IN AN ORGANIZED HEALTH CARE EDUCATION/TRAINING PROGRAM

## 2025-02-19 RX ADMIN — GABAPENTIN 300 MG: 300 CAPSULE ORAL at 21:52

## 2025-02-19 RX ADMIN — TRAMADOL HYDROCHLORIDE 25 MG: 50 TABLET, COATED ORAL at 17:34

## 2025-02-19 RX ADMIN — ICOSAPENT ETHYL 1 G: 1 CAPSULE, LIQUID FILLED ORAL at 17:34

## 2025-02-19 RX ADMIN — ROSUVASTATIN CALCIUM 10 MG: 10 TABLET, FILM COATED ORAL at 08:33

## 2025-02-19 RX ADMIN — LOSARTAN POTASSIUM 25 MG: 25 TABLET, FILM COATED ORAL at 08:33

## 2025-02-19 RX ADMIN — PROPRANOLOL HYDROCHLORIDE 10 MG: 20 TABLET ORAL at 21:52

## 2025-02-19 RX ADMIN — ICOSAPENT ETHYL 1 G: 1 CAPSULE, LIQUID FILLED ORAL at 08:33

## 2025-02-19 RX ADMIN — TRAMADOL HYDROCHLORIDE 25 MG: 50 TABLET, COATED ORAL at 07:25

## 2025-02-19 RX ADMIN — CELECOXIB 200 MG: 100 CAPSULE ORAL at 08:33

## 2025-02-19 ASSESSMENT — COGNITIVE AND FUNCTIONAL STATUS - GENERAL
HELP NEEDED FOR BATHING: A LITTLE
STANDING UP FROM CHAIR USING ARMS: A LOT
CLIMB 3 TO 5 STEPS WITH RAILING: A LOT
WALKING IN HOSPITAL ROOM: A LOT
DRESSING REGULAR LOWER BODY CLOTHING: A LITTLE
TURNING FROM BACK TO SIDE WHILE IN FLAT BAD: A LITTLE
MOBILITY SCORE: 15
MOVING TO AND FROM BED TO CHAIR: A LOT
DAILY ACTIVITIY SCORE: 22

## 2025-02-19 ASSESSMENT — PAIN DESCRIPTION - LOCATION: LOCATION: LEG

## 2025-02-19 ASSESSMENT — PAIN SCALES - GENERAL
PAINLEVEL_OUTOF10: 5 - MODERATE PAIN
PAINLEVEL_OUTOF10: 5 - MODERATE PAIN

## 2025-02-19 ASSESSMENT — PAIN - FUNCTIONAL ASSESSMENT: PAIN_FUNCTIONAL_ASSESSMENT: 0-10

## 2025-02-19 ASSESSMENT — PAIN DESCRIPTION - ORIENTATION: ORIENTATION: RIGHT

## 2025-02-19 ASSESSMENT — PAIN SCALES - PAIN ASSESSMENT IN ADVANCED DEMENTIA (PAINAD): TOTALSCORE: MEDICATION (SEE MAR)

## 2025-02-19 NOTE — PROGRESS NOTES
"Daily Progress Note    Nabor Baldwin is a 74 y.o. male on day 0 of admission presenting with Partial hamstring tear, initial encounter.    Subjective   Patient seen and examined, labs and vitals reviewed. Denied from [facility], waiting for pre-cert to discharge to SNF. Notes his pain is improving everyday and he has been able to weightbear for 1-2 steps. Denies CP, SOB, abd pain, n/v/d. Patient medically stab;e for discharge to SNF once pre-cert is obtained.       Objective   Physical Exam  Constitutional:       Appearance: Normal appearance. He is obese.   HENT:      Head: Normocephalic.      Mouth/Throat:      Mouth: Mucous membranes are moist.   Eyes:      Pupils: Pupils are equal, round, and reactive to light.   Cardiovascular:      Rate and Rhythm: Normal rate and regular rhythm.      Heart sounds: Normal heart sounds, S1 normal and S2 normal.   Pulmonary:      Effort: Pulmonary effort is normal.      Breath sounds: Normal breath sounds.   Abdominal:      General: Bowel sounds are normal.      Palpations: Abdomen is soft.      Tenderness: There is no abdominal tenderness.   Musculoskeletal:      Cervical back: Neck supple.      Right lower leg: No edema.      Left lower leg: No edema.      Right foot: Decreased range of motion. Foot drop present.      Comments: ACE bandage to right upper leg   Skin:     General: Skin is warm.   Neurological:      Mental Status: He is alert and oriented to person, place, and time.      Motor: Weakness present.   Psychiatric:         Mood and Affect: Mood normal.         Behavior: Behavior normal.         Last Recorded Vitals  Blood pressure 110/61, pulse 81, temperature 36.7 °C (98.1 °F), temperature source Temporal, resp. rate 19, height 1.727 m (5' 8\"), weight 90.7 kg (200 lb), SpO2 96%.  Intake/Output last 3 Shifts:  I/O last 3 completed shifts:  In: 840 (9.3 mL/kg) [P.O.:840]  Out: 502 (5.5 mL/kg) [Urine:502 (0.2 mL/kg/hr)]  Weight: 90.7 kg     Medications  Scheduled " medications  celecoxib, 200 mg, oral, Daily  enoxaparin, 40 mg, subcutaneous, q24h  gabapentin, 300 mg, oral, Nightly  icosapent ethyL, 1 g, oral, BID  insulin lispro, 0-5 Units, subcutaneous, TID AC  losartan, 25 mg, oral, Daily  propranolol, 10 mg, oral, Nightly  rosuvastatin, 10 mg, oral, Daily      Continuous medications     PRN medications  PRN medications: acetaminophen **OR** acetaminophen **OR** acetaminophen, HYDROmorphone, ondansetron **OR** ondansetron, polyethylene glycol, traMADol    Labs  CBC:   Results from last 7 days   Lab Units 02/19/25  0540 02/18/25  0542 02/17/25  0544   WBC AUTO x10*3/uL 8.5 7.2 8.9   RBC AUTO x10*6/uL 4.09* 3.92* 3.91*   HEMOGLOBIN g/dL 12.6* 12.2* 12.1*   HEMATOCRIT % 37.8* 35.8* 35.7*   MCV fL 92 91 91   MCH pg 30.8 31.1 30.9   MCHC g/dL 33.3 34.1 33.9   RDW % 11.9 11.9 11.7   PLATELETS AUTO x10*3/uL 321 270 270     CMP:    Results from last 7 days   Lab Units 02/19/25  0540 02/18/25  0542 02/17/25  0544   SODIUM mmol/L 137 136 135*   POTASSIUM mmol/L 4.1 4.2 4.2   CHLORIDE mmol/L 101 100 102   CO2 mmol/L 27 29 26   BUN mg/dL 22 20 21   CREATININE mg/dL 0.93 0.76 0.77   GLUCOSE mg/dL 145* 142* 138*   CALCIUM mg/dL 9.3 9.0 9.0     BMP:    Results from last 7 days   Lab Units 02/19/25  0540 02/18/25  0542 02/17/25  0544   SODIUM mmol/L 137 136 135*   POTASSIUM mmol/L 4.1 4.2 4.2   CHLORIDE mmol/L 101 100 102   CO2 mmol/L 27 29 26   BUN mg/dL 22 20 21   CREATININE mg/dL 0.93 0.76 0.77   CALCIUM mg/dL 9.3 9.0 9.0   GLUCOSE mg/dL 145* 142* 138*     Magnesium:    Troponin:      BNP:     Lipid Panel:         Nutrition             Relevant Results  Results from last 7 days   Lab Units 02/19/25  1026 02/19/25  0615 02/19/25  0540 02/18/25  2033 02/18/25  1615 02/18/25  1050 02/18/25  0555 02/18/25  0542 02/17/25  1027 02/17/25  0544 02/16/25  1050 02/16/25  0628 02/15/25  1056 02/15/25  0715   POCT GLUCOSE mg/dL 123* 152*  --  154* 131* 135*   < >  --    < >  --    < >  --    < >  --     GLUCOSE mg/dL  --   --  145*  --   --   --   --  142*  --  138*  --  128*  --  121*    < > = values in this interval not displayed.     Lab Results   Component Value Date    HGBA1C 7.0 (H) 12/30/2024        Assessment/Plan    Mechanical fall  Right leg pain  Right foot drop status post lumbar surgery  HTN/HLD  T2DM  Lumbar spinal stenosis surgery 2022  BRIGITTE uses a CPAP at night  BPH  Palpitations with PVCs on a beta-blocker at night     -Imaging negative for fracture  -Consult orthopedics, no intervention at this time weightbearing as tolerated  -MRI shows complete/partial tendon tear  -Ace bandage to right upper thigh  -Ice to affected area as needed  -Optimize pain control: Tylenol, Dilaudid, tramadol  -A1c 7.0% on 12/2024   -Diabetic diet with Accu-Chek and sliding scale  -Hold home metformin and Ozempic  -Continue home meds  -CBC and BMP daily  -CPAP at night  -PT/OT evaluation  -TCC for discharge planning       DVTp: Lovenox  PLAN: Medically stable for discharge to SNF once pre-CERT obtained    Vivian Badillo PA-C    Plan of care was discussed extensively with patient.  Patient verbalized understanding through teach back method.  All question and concerns addressed upon examination.    Of note, this documentation is completed using the Dragon Dictation system (voice recognition software). There may be spelling and/or grammatical errors that were not corrected prior to final submission.

## 2025-02-19 NOTE — PROGRESS NOTES
02/19/25 1053   Discharge Planning   Home or Post Acute Services Post acute facilities (Rehab/SNF/etc)   Type of Post Acute Facility Services Skilled nursing   Expected Discharge Disposition SNF  ([facility])   Does the patient need discharge transport arranged? Yes   RoundTrip coordination needed? Yes     Pt accepted to the facility. Insurance precert requested and is pending.

## 2025-02-19 NOTE — CARE PLAN
The patient's goals for the shift include      The clinical goals for the shift include Patient will remain free from fall/injury throughout the shift

## 2025-02-19 NOTE — PROGRESS NOTES
02/19/25 1623   Discharge Planning   Home or Post Acute Services Post acute facilities (Rehab/SNF/etc)   Type of Post Acute Facility Services Skilled nursing   Expected Discharge Disposition SNF  ([facility])   Does the patient need discharge transport arranged? Yes   RoundTrip coordination needed? Yes   Has discharge transport been arranged? Yes   What day is the transport expected? 02/19/25   What time is the transport expected? 1900     Insurance precert received and pt medically cleared for discharge. Care team and facility updated with discharge details. Pt notified and is in agreement with plan.

## 2025-02-21 ENCOUNTER — APPOINTMENT (OUTPATIENT)
Dept: DERMATOLOGY | Facility: CLINIC | Age: 75
End: 2025-02-21
Payer: OTHER GOVERNMENT

## 2025-02-27 ENCOUNTER — OFFICE VISIT (OUTPATIENT)
Dept: ORTHOPEDIC SURGERY | Facility: CLINIC | Age: 75
End: 2025-02-27
Payer: MEDICARE

## 2025-02-27 DIAGNOSIS — S76.319A HAMSTRING TEAR: ICD-10-CM

## 2025-02-27 PROCEDURE — 99213 OFFICE O/P EST LOW 20 MIN: CPT | Performed by: ORTHOPAEDIC SURGERY

## 2025-02-27 PROCEDURE — 4010F ACE/ARB THERAPY RXD/TAKEN: CPT | Performed by: ORTHOPAEDIC SURGERY

## 2025-02-27 PROCEDURE — 1123F ACP DISCUSS/DSCN MKR DOCD: CPT | Performed by: ORTHOPAEDIC SURGERY

## 2025-02-27 NOTE — PROGRESS NOTES
History of present illness: Patient seen evaluation at the hospital under consultation after right hip injury we were concerned about a fracture he had MRI that showed a complete hamstring rupture right side 2/13/2025    Complicating that matter is that right leg has had some neurologic injury from spine surgery he has a foot drop and he wears a chronic AFO    Physical exam:    General: No acute distress or breathing difficulty or discomfort, pleasant and cooperative with the examination.    Extremities: Right sided straight leg raise ecchymosis swelling edema is resolving   bruising is resolving   he can do now flexion extension abduction rotation the hip area with not much pain he obviously has a chronic foot drop and wears an AFO    He is less tender he can bear weight he is using a cane or crutch he is ambulating freely doing therapy and stretching the compression wraps as does help    There is no evidence of DVT no evidence of redness warmth or infection    Diagnostic studies: No new study    Impression: Complete right hamstring rupture    Plan: Continue conservative treatment PT rehab program can be beneficial gait training he will have strength deficit in that leg but with his neurologic injury foot drop age and mobility he is not a good surgical candidate for complete open hamstring reconstruction    He will be discharged from rehab with some tramadol    He like to do outpatient PT at our office and we will set that up to start sometime safely next week    I will see him back in 4 weeks no x-rays range of motion and strength check and ambulation check at that time

## 2025-03-04 ENCOUNTER — EVALUATION (OUTPATIENT)
Dept: PHYSICAL THERAPY | Facility: CLINIC | Age: 75
End: 2025-03-04
Payer: MEDICARE

## 2025-03-04 DIAGNOSIS — R53.1 DECREASED STRENGTH: ICD-10-CM

## 2025-03-04 DIAGNOSIS — M79.604 PAIN IN POSTERIOR RIGHT LOWER EXTREMITY: Primary | ICD-10-CM

## 2025-03-04 DIAGNOSIS — S76.319A HAMSTRING TEAR: ICD-10-CM

## 2025-03-04 PROCEDURE — 97110 THERAPEUTIC EXERCISES: CPT | Mod: GP

## 2025-03-04 PROCEDURE — 97161 PT EVAL LOW COMPLEX 20 MIN: CPT | Mod: GP

## 2025-03-04 ASSESSMENT — ENCOUNTER SYMPTOMS
LOSS OF SENSATION IN FEET: 1
DEPRESSION: 0
OCCASIONAL FEELINGS OF UNSTEADINESS: 0

## 2025-03-04 NOTE — PROGRESS NOTES
Physical Therapy Evaluation    Patient Name: Nabor Baldwin  MRN: [id number]  PT Evaluation Time Entry  PT Evaluation (Low) Time Entry: 30  PT Therapeutic Procedures Time Entry  Therapeutic Exercise Time Entry: 10                 Current Problem  1. Pain in posterior right lower extremity        2. Hamstring tear  Referral to Physical Therapy      3. Decreased strength          Insurance reviewed   Visit number: 1  ANTH[employer] MEDICARE BOA COPAY 30 DED 0   COVERAGE 100 OOP 4150(30) AUTH REQ# [id number]  1 VISIT 3-4-25 THRU 3-18-25 REF#[id number]  AVAILITY [id number]/ALL       Subjective   General:  Pt is a 74  year old male who presents to clinic with right hip pain secondary to full-thickness hamstring tear from a slip and mechanical fall on ice on 2/13/25. Pt states symptoms started after he slipped and fell on ice. He states he was in inpatient rehabilitation for 5 days in [place]. He states he was in the hospital for a week prior to his stay at inpatient. Pt then had visit with Dr Bailon on 2/27/25 and it was decided to hold surgery at this time and to continue with conservative care. Pt continues to wear ACE wrap over hamstring as given in hosptial. He states it provides more support. Pt describes symptoms as achy. Pt next follow up with Dr Bailon is 4/9/25. Pt denies SOB and calf tenderness. Activities that pt wish to get back to include returning to work, increasing walking tolerance. [facility]     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 Neuropathy in both LE  Fever/chills, unexplained weight changes, dizziness/fainting, unexplained change in bowel or bladder functions, unexplained malaise or muscle weakness, numbness or tingling  Does report night sweats    Reviewed medical screening form with pt and medical screening assessed    Imaging:   MRI 25  IMPRESSION:  1. Full-thickness tear of the conjoint tendon of the semitendinosus  and biceps femoris from its origin at the ischial tuberosity with  mild retraction.  2. High-grade partial-thickness muscle tear of the semitendinosus and  biceps femoris musculature with formation of a large hematoma  predominately in the proximal semitendinosus muscle    Objective   PALPATION: denies calf tenderness, tender over hamstring muscle belly             GAIT: ambulates with SPC and AFO on RLE, foot drop on RLE        AROM  Right hip flexion: 80      Left hip flexion: 85    Right hip ER seated: 23     Left hip ER seated: 25    Right hip IR seated: 20     Left hip IR seated: 22    Right knee flexion: 115      Left knee flexion: 105    Right knee extension: -3      Left knee extension: -5        MMT  Right hip ER: 3+      Left hip ER: 5    Right hip IR:  3+    Left hip IR: 5    Right quadriceps: 4+      Left quadriceps: 5    Right iliopsoas: 4      Left iliopsoas: 4+    Right gluteus medius: 4+      Left gluteus medius: 4+    Right hip adductors: 4+      Left hip adductors: 5    Right gluteus razia: 3-      Left gluteus razia: 3-    Right hamstrin-     Left hamstrin      Flexibility  Iliopsoas right: limited     Iliopsoas left: limited    Hamstring right: limited      Hamstring left: limited    Quadriceps right: limited     Quadriceps left: limited        Outcome Measures:        Treatment: See HEP below  Glute sets x10 3s  Hamstring sets knee bent x10 3s  Quad sets x10 3s  Supine hip adduction squeeze x10  3s  Hooklying clamshell x10 RTB    EDUCATION/HEP:  Access Code: [email or web address]  URL: [email or web address]  Date: 03/04/2025  Prepared by: Doreen Rizvi    Exercises  - Supine Gluteal Sets  - 1 x daily - 4 x weekly - 2 sets - 10 reps - 3s hold  - Supine Isometric Hamstring Set  - 1 x daily - 4 x weekly - 2 sets - 10 reps - 3s hold  - Supine Quad Set  - 1 x daily - 4 x weekly - 2 sets - 10 reps  - Supine Hip Adduction Isometric with Ball  - 1 x daily - 4 x weekly - 2 sets - 10 reps  - Hooklying Clamshell with Resistance  - 1 x daily - 4 x weekly - 2 sets - 10 reps  Assessment:  Pt is a 74  year old male who presents to clinic with right hip pain secondary to full-thickness hamstring tear from a slip and mechanical fall on ice on 2/13/25. Pt tolerated all treatment interventions well today with no increase in pain noted. Pt reported analgesic effect and decreased stiffness post session. Impairments include pain, decreased ROM, decreased strength, gait. Functional limitations include prolonged walking, prolonged standing, stair climbing, squatting, mobility. Pt educated on diagnoses, prognosis, rationale for exercises, POC, anatomy, HEP, and compliance. Pt demonstrated good understanding of all education. Pt is a good candidate for skilled PT to address outlined impairments and functional limitations to achieve patient-centered goals and return to PLOF.      Clinical Presentation: Stable     Level of Complexity: Low     Goals:  1. Pt is independent w/advanced HEP.  2. R LE grossly 4+-5/5 including good eccentric R quad to perform all functional mobility.  3. perform sit<>stand no UE assist from low surface for max functional mobility.  4. R LE SLS >/=30 sec no UE assist on compliant surface without UE assist for functional carryover into dynamic balance.  5. 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  6. Pt will be able to navigate stairs with 0-1/10 pain and reciprocal gait pattern in order to improve ability to maneuver in his 2 story home.     Plan  Treatment/Interventions: Cryotherapy, Dry needling, Education/ Instruction, Electrical stimulation, Gait training, Manual therapy, Neuromuscular re-education, Self care/ home management, Taping techniques, Therapeutic activities, Therapeutic exercises  PT Plan: Skilled PT  PT Frequency: 2 times per week  Duration: 5wks  Onset Date: 02/15/25  Rehab Potential: Good  Plan of Care Agreement: Patient

## 2025-03-12 ENCOUNTER — TREATMENT (OUTPATIENT)
Dept: PHYSICAL THERAPY | Facility: CLINIC | Age: 75
End: 2025-03-12
Payer: MEDICARE

## 2025-03-12 DIAGNOSIS — M79.604 PAIN IN POSTERIOR RIGHT LOWER EXTREMITY: ICD-10-CM

## 2025-03-12 DIAGNOSIS — S76.319A HAMSTRING TEAR: Primary | ICD-10-CM

## 2025-03-12 DIAGNOSIS — R53.1 DECREASED STRENGTH: ICD-10-CM

## 2025-03-12 PROCEDURE — 97110 THERAPEUTIC EXERCISES: CPT | Mod: GP,CQ

## 2025-03-12 ASSESSMENT — PAIN - FUNCTIONAL ASSESSMENT: PAIN_FUNCTIONAL_ASSESSMENT: 0-10

## 2025-03-12 ASSESSMENT — PAIN DESCRIPTION - DESCRIPTORS: DESCRIPTORS: DULL

## 2025-03-12 ASSESSMENT — PAIN SCALES - GENERAL: PAINLEVEL_OUTOF10: 3

## 2025-03-12 NOTE — PROGRESS NOTES
"Physical Therapy Treatment    Patient Name: Nabor Baldwin  MRN: [id number]  Today's Date: 3/12/2025  Time Calculation  Start Time: 1230  Stop Time: 1314  Time Calculation (min): 44 min    Insurance  Visit number: 2  ANTH[employer]EVE MEDICARE BOA COPAY 30 DED 0   COVERAGE 100 OOP 4150(30) AUTH REQ# [id number]  1 VISIT 3-4-25 THRU 3-18-25 REF#[id number]  AVAILITY [id number]/ALL        Current Problem  1. Hamstring tear        2. Pain in posterior right lower extremity  Follow Up In Physical Therapy      3. Decreased strength  Follow Up In Physical Therapy          Subjective    General   Pt reports being sore following initial visit, but only into next day \"and then it started feeling better after that.\"  He has been working the HEP given & doing well with them.    Reports he is feeling sore today, \"because I might have done too much yesterday.\"  Pt reports going for a walk at the [employer] & then working out in his yard at home.     Precautions  Precautions  Precautions Comment: No new falls reported    Pain  Pain Assessment  Pain Assessment: 0-10  0-10 (Numeric) Pain Score: 3  Pain Location: Leg  Pain Orientation: Right, Left  Pain Descriptors: Dull  Pain Frequency: Constant/continuous  Clinical Progression: Gradually improving    Objective   Pt ambulates with SPC for support.    Treatments:  Therapeutic Exercises (41273): 41 Minutes, 3 Units    Activities:  Glute Set: 5\" x15  HS Set: 5\" x15  QS: 5\" x15  Hooklying Hip Adduction Squeeze\" 5\" x20  Hooklying Hip TB Abduction: GTB, 2x10  SLR: x15 J LUIS  Bridge on Heels: x15  SL SLR: x15 J LUIS  Clamshell: GTB, 2x10 J LUIS  HR/TR: -->  Add NV  Stdg Hip PRE's: --> Add NV  Squats: --> Add NV    Assessment:   Reviewed activities initiated first visit & continued to progress with activities, as noted, to increase LE/core strength, stability.  Pt exhibits good recall of ex's & had good follow through to any vc's provided for current/new activities.  He had some difficulty with activities " secondary to overall weakness, but able to complete those given.  Expected muscle soreness, fatigue reported post activity.  Pt provided with updated HEP handouts this visit with activities added.  Anticipate he will be able to continue progressing with POC as tolerated.    Plan:   Assess response to progression of activities this visit.  Continue with POC & progressions as tolerated to increase LE/core strength, stability and improve functional mobility, capacity for daily activities.    OP EDUCATION:   Access Code: [email or web address]  URL: [email or web address]  Date: 03/12/2025  Prepared by: Umu Hermosillo    Exercises  - Hooklying Gluteal Sets  - 1 x daily - 7 x weekly - 10 reps - 10 hold  - Supine Isometric Hamstring Set  - 1 x daily - 7 x weekly - 10 reps - 10 hold  - Supine Quad Set  - 1 x daily - 7 x weekly - 10 reps - 10 hold  - Supine Hip Adduction Isometric with Ball  - 1 x daily - 7 x weekly - 3 sets - 10 reps - 5 hold  - Hooklying Abduction with Resistance  - 1 x daily - 7 x weekly - 3 sets - 10 reps  - Active Straight Leg Raise with Quad Set  - 1 x daily - 7 x weekly - 3 sets - 10 reps  - Bridge on Heels  - 1 x daily - 7 x weekly - 3 sets - 10 reps  - Sidelying Hip Abduction  - 1 x daily - 7 x weekly - 3 sets - 10 reps  - Clamshell with Resistance  - 1 x daily - 7 x weekly - 3 sets - 10 reps

## 2025-03-18 NOTE — PROGRESS NOTES
"Physical Therapy Treatment    Patient Name: Nabor Baldwin  MRN: [id number]  Today's Date: 3/19/2025  Time Calculation  Start Time: 1005  Stop Time: 1046  Time Calculation (min): 41 min     PT Therapeutic Procedures Time Entry  Neuromuscular Re-Education Time Entry: 8  Therapeutic Exercise Time Entry: 31                 Current Problem  1. Pain in posterior right lower extremity  Follow Up In Physical Therapy      2. Decreased strength  Follow Up In Physical Therapy      3. Hamstring tear            Insurance:  Visit number: 3/7  [facility] MEDICARE APPROVED  6  PT  VISITS 3-6-25 THRU 5-4-25  AUTH# [id number]   [id number]/ALL   [facility] MEDICARE BOA COPAY 30 DED 0   COVERAGE 100 OOP 4150(30) AUTH REQ# [id number]  Precautions   WBAT    Subjective   Subjective:   Pt reports that he is doing better than originally. Still has some pain in the middle aspect of the back of the thigh.   Pain   2-3/10    Objective   Treatments:    Glute Set: 5\" x15  HS Set: 5\" x15----  QS: 5\" x15  Hooklying Hip Adduction Squeeze\" 5\" x20----  Hooklying Hip TB Abduction: GTB, 2x10   SLR: 2x10   Bridge on Heels: x15  SL SLR: x15 J LUIS  S/l Clamshell: , 2x10   LAQ 2x10  Sit to stands w/o UE 15x  HR/TR: 20x  Stdg Hip flex/abd B 10x2  SLB 10\"x3    OP EDUCATION:   SLB      Assessment:   Pt felt some discomfort in the R proximal hamstring with supine SLR. Pt had difficulty with maintaining pelvic in neutral with s/l clamshells. He felt some strain in the hamstring during s/l hip abd. Introduced sit to stands , with good form. Also added standing hip 2-way hip with cues to not allow the trunk to lean fwd or swing the leg. Pt had to use 1 UE for SLB  Plan:   Cont with gradual R LE strengthening              "

## 2025-03-19 ENCOUNTER — TREATMENT (OUTPATIENT)
Dept: PHYSICAL THERAPY | Facility: CLINIC | Age: 75
End: 2025-03-19
Payer: MEDICARE

## 2025-03-19 DIAGNOSIS — M79.604 PAIN IN POSTERIOR RIGHT LOWER EXTREMITY: Primary | ICD-10-CM

## 2025-03-19 DIAGNOSIS — S76.319A HAMSTRING TEAR: ICD-10-CM

## 2025-03-19 DIAGNOSIS — R53.1 DECREASED STRENGTH: ICD-10-CM

## 2025-03-19 PROCEDURE — 97110 THERAPEUTIC EXERCISES: CPT | Mod: GP,CQ

## 2025-03-19 PROCEDURE — 97112 NEUROMUSCULAR REEDUCATION: CPT | Mod: GP,CQ

## 2025-03-21 ENCOUNTER — TREATMENT (OUTPATIENT)
Dept: PHYSICAL THERAPY | Facility: CLINIC | Age: 75
End: 2025-03-21
Payer: MEDICARE

## 2025-03-21 DIAGNOSIS — M79.604 PAIN IN POSTERIOR RIGHT LOWER EXTREMITY: ICD-10-CM

## 2025-03-21 DIAGNOSIS — S76.319A HAMSTRING TEAR: Primary | ICD-10-CM

## 2025-03-21 DIAGNOSIS — R53.1 DECREASED STRENGTH: ICD-10-CM

## 2025-03-21 PROCEDURE — 97110 THERAPEUTIC EXERCISES: CPT | Mod: GP,CQ

## 2025-03-21 ASSESSMENT — PAIN SCALES - GENERAL: PAINLEVEL_OUTOF10: 0 - NO PAIN

## 2025-03-21 ASSESSMENT — PAIN - FUNCTIONAL ASSESSMENT: PAIN_FUNCTIONAL_ASSESSMENT: 0-10

## 2025-03-21 NOTE — PROGRESS NOTES
"Physical Therapy Treatment    Patient Name: Nabor Baldwin  MRN: [id number]  Today's Date: 3/21/2025  Time Calculation  Start Time: 1000  Stop Time: 1045  Time Calculation (min): 45 min    Insurance  Visit number: 4/7  [employer] MEDICARE APPROVED  6  PT  VISITS 3-6-25 THRU 5-4-25  AUTH# [id number]   [id number]/ALL   [employer] MEDICARE BOA COPAY 30 DED 0   COVERAGE 100 OOP 4150(30) AUTH REQ# [id number]    Current Problem  1. Hamstring tear        2. Pain in posterior right lower extremity  Follow Up In Physical Therapy      3. Decreased strength  Follow Up In Physical Therapy          Subjective    General   Pt has no c/o pain today.  He reports his legs are \"definitely\" feeling stronger.  He reports he has been able to walk around the house now without having to use his cane.  States he does take with him when out in the community, \"but don't really use a whole lot.\"     Precautions  Precautions  Precautions Comment: No new falls reported    Pain  Pain Assessment  Pain Assessment: 0-10  0-10 (Numeric) Pain Score: 0 - No pain  Pain Location: Leg  Pain Orientation: Right, Left  Clinical Progression: Gradually improving    Objective   Pt ambulates with SPC for support (mostly observed just carrying with him as he ambulates through clinic).    Treatments:  Therapeutic Exercises (18106): 42 Minutes, 3 Units    Activities:  Glute Set: 5\" x15---(not this visit)  HS Set: 5\" x15---(not this visit)  QS: 5\" x15---(not this visit)  Hooklying Hip Adduction Squeeze\" 5\" x20  Hooklying Hip TB Abduction: GTB, 2x10   SLR: 2x10 J LUIS  Bridge on Heels: 2x10  SL SLR: 2x10 J LUIS  S/l Clamshell: GTB, 2x10 J LUIS  LAQ w/add squeeze: 2x10  Sit to stands w/o UE x20  HR/TR: 20x  Stdg Hip flex/abd B 10x2  SLB 15\"x3  J LUIS     Assessment:   Pt doing well with current rehab POC.  He continues with activities today & able to increase reps with most activities, demonstrating increase in strength & overall endurance.  Most challenged with SLB & requires light " fingertip support to maintain.  Occasional vc's provided for general form/technique with activities, with good follow through observed.  He completes activities with good tolerance, normal muscle soreness, fatigue reported.  No c/o increased hamstring discomfort reported this visit during activities.  He is making good progress with POC & demonstrates good ability to continue progressing with activities as tolerated.    Plan:   Continue with POC & progressions as tolerated to increase LE/core strength, stability and improve functional mobility, capacity for daily activities.

## 2025-03-25 NOTE — PROGRESS NOTES
"Physical Therapy Treatment    Patient Name: Nabor Baldwin  MRN: [id number]  Today's Date: 3/26/2025  Time Calculation  Start Time: 1135  Stop Time: 1220  Time Calculation (min): 45 min     PT Therapeutic Procedures Time Entry  Neuromuscular Re-Education Time Entry: 10  Therapeutic Exercise Time Entry: 32                 Current Problem  1. Pain in posterior right lower extremity  Follow Up In Physical Therapy      2. Decreased strength  Follow Up In Physical Therapy      3. Hamstring tear            Insurance:  Visit number: 5/7  [facility] MEDICARE APPROVED  6  PT  VISITS 3-6-25 THRU 5-4-25  AUTH# [id number]   [id number]/ALL   [facility] MEDICARE BOA COPAY 30 DED 0   COVERAGE 100 OOP 4150(30) AUTH REQ# [id number]     Precautions   WBAT    Subjective   Subjective:   Pt reports that his leg is feeling pretty good.   Pain   0/10    Objective   Treatments:  Glute Set: 5\" x15---(not this visit)  HS Set: 5\" x15---(not this visit)  QS: 5\" x15---(not this visit)  Hooklying Hip Adduction Squeeze\" 5\" x20---  Hooklying Hip TB Abduction: GTB, 2x10 ---  S/l Clamshell: GTB, 2x10 J LUIS----  LAQ 2#: 25x  Bridges on heels 2x10  SLR/VMO  B 2x10  Sit to stands w/o UE 5# x20  Step ups F/L 6 in 2x10  Standing HSC 10x  HR/TR: 20x  Stdg Hip flex/abd 1# B 10x2 (abd only today)  SLB 15\"x3  J LUIS---    OP EDUCATION:   Maintaining posture during ex      Assessment:   Pt tends to speed up with SLRs, as he fatigues. Added # to LAQ and sit to stands. Introduced step ups, which pt felt r hip muscle soreness when doing lateral step ups. Pt fatigued with sit to stands. Tried standing HSC, Pt initially had some pain, but after doing a couple reps, it wasn't painful. Pt able to elicit a little R DF with toe raises. Good overall completion of ex given     Plan:   Cont with R LE strengthening to allow for a return to work in april              "

## 2025-03-26 ENCOUNTER — TREATMENT (OUTPATIENT)
Dept: PHYSICAL THERAPY | Facility: CLINIC | Age: 75
End: 2025-03-26
Payer: MEDICARE

## 2025-03-26 DIAGNOSIS — R53.1 DECREASED STRENGTH: ICD-10-CM

## 2025-03-26 DIAGNOSIS — M79.604 PAIN IN POSTERIOR RIGHT LOWER EXTREMITY: Primary | ICD-10-CM

## 2025-03-26 DIAGNOSIS — S76.319A HAMSTRING TEAR: ICD-10-CM

## 2025-03-26 PROCEDURE — 97110 THERAPEUTIC EXERCISES: CPT | Mod: GP,CQ

## 2025-03-26 PROCEDURE — 97112 NEUROMUSCULAR REEDUCATION: CPT | Mod: GP,CQ

## 2025-03-28 ENCOUNTER — TREATMENT (OUTPATIENT)
Dept: PHYSICAL THERAPY | Facility: CLINIC | Age: 75
End: 2025-03-28
Payer: MEDICARE

## 2025-03-28 DIAGNOSIS — M79.604 PAIN IN POSTERIOR RIGHT LOWER EXTREMITY: ICD-10-CM

## 2025-03-28 DIAGNOSIS — R53.1 DECREASED STRENGTH: ICD-10-CM

## 2025-03-28 PROCEDURE — 97110 THERAPEUTIC EXERCISES: CPT | Mod: GP

## 2025-03-28 NOTE — PROGRESS NOTES
Physical Therapy Treatment    Patient Name: Nabor Baldwin  MRN: [id number]  Today's Date: 3/28/2025  Time Calculation  Start Time: 1037  Stop Time: 1105  Time Calculation (min): 28 min     PT Therapeutic Procedures Time Entry  Therapeutic Exercise Time Entry: 28                 Current Problem  1. Pain in posterior right lower extremity  Follow Up In Physical Therapy      2. Decreased strength  Follow Up In Physical Therapy          Insurance:  Visit number:   [employer] MEDICARE APPROVED  6  PT  VISITS 3-6-25 THRU 25  AUTH# [id number]   [id number]/ALL   [employer] MEDICARE BOA COPAY 30 DED 0   COVERAGE 100 OOP 4150(30) AUTH REQ# [id number]     Precautions   WBAT    Subjective   Subjective:   Pt reports his leg is feeling good today. He states he is better than 100% . He thinks therapy has made his RLE even stronger than before his fall.  Pain   0/10    Objective                           GAIT: ambulates with SPC and AFO on RLE, foot drop on RLE         AROM  Right hip flexion: 90      Left hip flexion: 85    Right hip ER seated: 25     Left hip ER seated: 25    Right hip IR seated: 20     Left hip IR seated: 22    Right knee flexion: 115      Left knee flexion: 105    Right knee extension: -3      Left knee extension: -5        MMT  Right hip ER: 5      Left hip ER: 5    Right hip IR:  5   Left hip IR: 5    Right quadriceps: 5      Left quadriceps: 5    Right iliopsoas: 4+      Left iliopsoas: 4+    Right gluteus medius: 4+      Left gluteus medius: 4+    Right hip adductors: 5    Left hip adductors: 5    Right gluteus razia: 4+      Left gluteus razia: 4+  Right hamstrin     Left hamstrin       Flexibility  Iliopsoas right: limited     Iliopsoas left: limited    Hamstring right: limited      Hamstring left: limited    Quadriceps right: limited     Quadriceps left: limited          Outcome Measures:   LEFS 68/80     Treatments:  Objective measures updated  Goals reviewed   HEP review  "    Not Today     Glute Set: 5\" x15---(not this visit)  HS Set: 5\" x15---(not this visit)  QS: 5\" x15---(not this visit)  Hooklying Hip Adduction Squeeze\" 5\" x20---  Hooklying Hip TB Abduction: GTB, 2x10 ---  S/l Clamshell: GTB, 2x10 J LUIS----  LAQ 2#: 25x  Bridges on heels 2x10  SLR/VMO  B 2x10  Sit to stands w/o UE 5# x20  Step ups F/L 6 in 2x10  Standing HSC 10x  HR/TR: 20x  Stdg Hip flex/abd 1# B 10x2 (abd only today)  SLB 15\"x3  J LUIS---    OP EDUCATION/HEP:  Access Code: [email or web address]  URL: [email or web address]  Date: 03/28/2025  Prepared by: Doreen Rizvi    Exercises  - Seated Long Arc Quad  - 1 x daily - 4 x weekly - 2 sets - 10 reps  - Bridge on Heels  - 1 x daily - 4 x weekly - 2 sets - 10 reps  - Active Straight Leg Raise with Quad Set  - 1 x daily - 4 x weekly - 2 sets - 10 reps  - Sit to Stand  - 1 x daily - 4 x weekly - 2 sets - 10 reps  - Step Up  - 1 x daily - 4 x weekly - 2 sets - 10 reps  - Lateral Step Up  - 1 x daily - 4 x weekly - 2 sets - 10 reps  - Standing Knee Flexion  - 1 x daily - 4 x weekly - 2 sets - 10 reps  - Heel Toe Raises with Counter Support  - 1 x daily - 4 x weekly - 2 sets - 10 reps  - Standing Hip Flexion with Counter Support  - 1 x daily - 4 x weekly - 2 sets - 10 reps  - Standing Hip Abduction with Counter Support  - 1 x daily - 4 x weekly - 2 sets - 10 reps  - Supine Quad Set  - 1 x daily - 4 x weekly - 2 sets - 10 reps  - Supine Isometric Hamstring Set  - 1 x daily - 4 x weekly - 2 sets - 10 reps  - Supine Gluteal Sets  - 1 x daily - 4 x weekly - 2 sets - 10 reps  - Supine Hip Adduction Isometric with Ball  - 1 x daily - 4 x weekly - 2 sets - 10 reps  - Hooklying Clamshell with Resistance  - 1 x daily - 4 x weekly - 2 sets - 10 reps    Assessment:   Today's session focused on discharge of pt case from skilled PT. Pt objective measures updated and goals reviewed at this time. Pt demonstrated improvements with strength, STSs, community ambulation, and stair " navigation, meeting all of those goals set for him at initial evaluation. Pt nearly met all remaining goals. Pt LEFS score also improved, indicating overall increase in daily function with RLE. Pt states he feels 100%, if not more than 100%, in functional improvement since beginning skilled PT. Due to progress pt has made at this time, it is recommended pt case be formally discharged from skilled PT while pt manages his progress on his own with comprehensive HEP. Pt was provided and educated on comprehensive HEP. Pt demonstrated competence with all exercise on HEP. Pt also educated on results from objective measures and goal review as well as rationale for POC decision. Pt demonstrated good understanding of all education provided today.          Plan:  Discharge pt from skilled PT to continue to manage progress at home with comprehensive HEP    Goals (met or nearly met all goals):  1. Pt is independent w/advanced HEP. -met   2. R LE grossly 4+-5/5 including good eccentric R quad to perform all functional mobility. -met  3. perform sit<>stand no UE assist from low surface for max functional mobility.-met  4. R LE SLS >/=30 sec no UE assist on compliant surface without UE assist for functional carryover into dynamic balance.- nearly met  5. Pt will be able to ambulate community distance with no deficits over varying surfaces/inclines independent without increased pain R LE in order to improve his ability [place] airport [place] work. - met  6. Pt will be able to navigate stairs with 0-1/10 pain and reciprocal gait pattern in order to improve ability to maneuver in his 2 story home. -met

## 2025-04-09 ENCOUNTER — OFFICE VISIT (OUTPATIENT)
Dept: ORTHOPEDIC SURGERY | Facility: CLINIC | Age: 75
End: 2025-04-09
Payer: OTHER GOVERNMENT

## 2025-04-09 DIAGNOSIS — S76.319A HAMSTRING TEAR: Primary | ICD-10-CM

## 2025-04-09 PROCEDURE — 99213 OFFICE O/P EST LOW 20 MIN: CPT | Performed by: ORTHOPAEDIC SURGERY

## 2025-04-09 NOTE — PROGRESS NOTES
History of present illness: Right hamstring tear sprain strain and now significantly improve    Physical exam:    General: No acute distress or breathing difficulty or discomfort, pleasant and cooperative with the examination.    Extremities: Right hamstring essentially recovered ecchymosis swelling bruising edema resolved    He can straight leg raise    He can flex past 90    No pain no weakness no step-off he can stand he can raise he can sit he can kneel he is able to plantarflex dorsiflex no radiculopathy he had a palpable defect and then a little bit of hematoma but is completely resolved right side    Diagnostic studies: No new study    Impression: Right hamstring sprain strain or tear    Plan: Resolution of hamstring discomfort return to light full activities I will see him back as needed   Good

## 2025-04-24 ENCOUNTER — APPOINTMENT (OUTPATIENT)
Dept: PRIMARY CARE | Facility: CLINIC | Age: 75
End: 2025-04-24
Payer: OTHER GOVERNMENT

## 2025-04-24 VITALS
HEIGHT: 68 IN | DIASTOLIC BLOOD PRESSURE: 88 MMHG | OXYGEN SATURATION: 94 % | SYSTOLIC BLOOD PRESSURE: 129 MMHG | BODY MASS INDEX: 30.77 KG/M2 | TEMPERATURE: 98.4 F | HEART RATE: 85 BPM | WEIGHT: 203 LBS

## 2025-04-24 DIAGNOSIS — E11.42 TYPE 2 DIABETES MELLITUS WITH DIABETIC POLYNEUROPATHY, WITHOUT LONG-TERM CURRENT USE OF INSULIN: ICD-10-CM

## 2025-04-24 DIAGNOSIS — N13.8 BENIGN LOCALIZED HYPERPLASIA OF PROSTATE WITH URINARY OBSTRUCTION: ICD-10-CM

## 2025-04-24 DIAGNOSIS — M48.061 DEGENERATIVE LUMBAR SPINAL STENOSIS: ICD-10-CM

## 2025-04-24 DIAGNOSIS — I10 PRIMARY HYPERTENSION: Primary | ICD-10-CM

## 2025-04-24 DIAGNOSIS — E66.811 CLASS 1 OBESITY WITH SERIOUS COMORBIDITY AND BODY MASS INDEX (BMI) OF 30.0 TO 30.9 IN ADULT, UNSPECIFIED OBESITY TYPE: ICD-10-CM

## 2025-04-24 DIAGNOSIS — E78.2 HYPERLIPIDEMIA, MIXED: ICD-10-CM

## 2025-04-24 DIAGNOSIS — R00.2 PALPITATIONS: ICD-10-CM

## 2025-04-24 DIAGNOSIS — I49.3 PVC (PREMATURE VENTRICULAR CONTRACTION): ICD-10-CM

## 2025-04-24 DIAGNOSIS — E53.8 VITAMIN B 12 DEFICIENCY: ICD-10-CM

## 2025-04-24 DIAGNOSIS — N40.1 BENIGN LOCALIZED HYPERPLASIA OF PROSTATE WITH URINARY OBSTRUCTION: ICD-10-CM

## 2025-04-24 DIAGNOSIS — G47.33 OSA ON CPAP: ICD-10-CM

## 2025-04-24 PROBLEM — R52 PAIN, UNSPECIFIED: Status: ACTIVE | Noted: 2025-04-24

## 2025-04-24 PROBLEM — I49.9 CARDIAC ARRHYTHMIA: Status: ACTIVE | Noted: 2025-04-24

## 2025-04-24 PROBLEM — T83.9XXA: Status: ACTIVE | Noted: 2025-04-24

## 2025-04-24 PROBLEM — Z86.79 HISTORY OF HYPERTENSION: Status: ACTIVE | Noted: 2025-04-24

## 2025-04-24 PROBLEM — D49.9 NEOPLASM OF UNSPECIFIED BEHAVIOR OF UNSPECIFIED SITE: Status: ACTIVE | Noted: 2025-04-24

## 2025-04-24 PROBLEM — M21.371 FOOT DROP, RIGHT FOOT: Status: ACTIVE | Noted: 2025-04-24

## 2025-04-24 PROBLEM — C44.91 BASAL CELL CARCINOMA OF SKIN: Status: ACTIVE | Noted: 2025-04-24

## 2025-04-24 PROBLEM — E83.42 HYPOMAGNESEMIA: Status: ACTIVE | Noted: 2025-04-24

## 2025-04-24 PROBLEM — M67.919 DISORDER OF ROTATOR CUFF: Status: ACTIVE | Noted: 2025-04-24

## 2025-04-24 PROBLEM — Z77.29 EXPOSURE TO POTENTIALLY HAZARDOUS SUBSTANCE: Status: ACTIVE | Noted: 2025-04-24

## 2025-04-24 PROBLEM — M06.9 RHEUMATOID ARTHRITIS: Status: ACTIVE | Noted: 2025-04-24

## 2025-04-24 PROBLEM — H53.9 VISION DISORDER: Status: ACTIVE | Noted: 2025-04-24

## 2025-04-24 PROCEDURE — 99214 OFFICE O/P EST MOD 30 MIN: CPT | Performed by: FAMILY MEDICINE

## 2025-04-24 PROCEDURE — 4010F ACE/ARB THERAPY RXD/TAKEN: CPT | Performed by: FAMILY MEDICINE

## 2025-04-24 PROCEDURE — 1159F MED LIST DOCD IN RCRD: CPT | Performed by: FAMILY MEDICINE

## 2025-04-24 PROCEDURE — 3074F SYST BP LT 130 MM HG: CPT | Performed by: FAMILY MEDICINE

## 2025-04-24 PROCEDURE — 1160F RVW MEDS BY RX/DR IN RCRD: CPT | Performed by: FAMILY MEDICINE

## 2025-04-24 PROCEDURE — 3079F DIAST BP 80-89 MM HG: CPT | Performed by: FAMILY MEDICINE

## 2025-04-24 PROCEDURE — G2211 COMPLEX E/M VISIT ADD ON: HCPCS | Performed by: FAMILY MEDICINE

## 2025-04-24 PROCEDURE — 3008F BODY MASS INDEX DOCD: CPT | Performed by: FAMILY MEDICINE

## 2025-04-24 NOTE — PROGRESS NOTES
Subjective   Patient ID: Nabor Baldwin is a 74 y.o. male who presents for Follow-up.    HPI     Patient reports having testing on the feet due to neuropathy but [facility].     Patient reports taking B12 supplement (5,000 mg) after he saw labs results were low.       Pt was admitted to hospital 2/13/2024 for leg pain after slipping on ice.  Was diagnosed with a RIGHT hamstring tear.  Pt was in the hospital for 1 week.   It was determined that he was not a good surgical candidate for complete open hamstring reconstruction.  Pt completed physical therapy.  Pt states he is doing better now and is back to baseline.   Denies any deformity of the right hamstring.     [facility]  Colon: 12/14/2021  AWV: 01/2025          He has hypertension.  Patient does monitor his BP at home occasionally.  Denies SOB, CP, and dizziness.  Patient is compliant with his antihypertensive therapy and denies any noted side effects.     He has hyperlipidemia.  Patient tries to limit the amount of fatty foods and high cholesterol foods that he consumes.  Patient is compliant with his statin therapy and denies any currently noted side effects.     He has DM Type 2 with diabetic neuropathy.  The patient does try to limit the amount of carbohydrates that he consumes in his diet.  He does monitor sugars at home occasionally but not on an everyday basis.  Neuropathy symptoms in feet bilaterally has been stable.     Patient has degenerative lumbar spinal stenosis.   Pt continues to utilize Celebrex with good results.  Underwent emergency surgery in [place] after losing control of his legs.  Pt underwent L2-5 decompression (discectomy/laminectomy) at [facility] in [place], [place] on 9/21/23.  He does has some residual right foot drop      He has obstructive sleep apnea.  Patient is compliant with the use of his CPAP on a nightly basis.  Patient continues to benefit from CPAP therapy.  He does awaken feeling refreshed.     Patient has BPH  "with a history of urinary retention.  He underwent HOLMIUM LASER ENUCLEATION OF THE PROSTATE 2/4/22 by Dr. Lou.  He continues to have significant reduction in obstructive symptoms.     Patient previously experienced palpitations.  Cardiology diagnosed him with PVCs and started him on propranolol at bedtime.  This continues to work well with suppressing the PVCs/palpitations.           Review of Systems  Constitutional: Patient denies any fever, chills, loss of appetite, or unexplained weight loss.  Cardiovascular: Patient denies any chest pain, shortness of breath with exertion, tachycardia, palpitations, orthopnea, or paroxysmal nocturnal dyspnea.  Respiratory: Patient denies any cough, shortness of breath, or wheezing.  Gastrointestinal: Patient denies any nausea, vomiting, diarrhea, constipation, melena, hematochezia, or reflux symptoms  Skin: Denies any rashes or skin lesions  Neurology: Patient denies any new motor or sensory losses. Denies any numbness, tingling, weakness, and incoordination of the extremities. Patient also denies any tremor, seizures, or gait instability.  Endocrinology: Denies any polyuria, polydipsia, polyphagia, or heat/cold intolerance.      Objective   /88 (BP Location: Right arm, Patient Position: Sitting, BP Cuff Size: Adult)   Pulse 85   Temp 36.9 °C (98.4 °F) (Temporal)   Ht 1.727 m (5' 8\")   Wt 92.1 kg (203 lb)   SpO2 94%   BMI 30.87 kg/m²     Physical Exam  General Appearance: Alert and cooperative, in no acute distress, well-developed/well-nourished obese male ambulating with a cane.     Neck: Supple and without adenopathy or rigidity. There is no JVD at 90° and no carotid bruits are noted. There is no thyromegaly, thyroid tenderness, or palpable thyroid nodules.  Heart: Regular rate and rhythm without murmur or ectopy.  Lungs: Clear to auscultation bilaterally with good air exchange.  Skin: Good turgor, moist, warm and without rashes or lesions.  Neurological exam: " Alert and oriented ×3, no tremor, normal gait.  Extremities: No clubbing, cyanosis, or edema      Assessment/Plan     HTN:    Stable on in office readings.  Blood pressure appears adequately controlled and we will continue with the current antihypertensive therapy.    Mixed hyperlipidemia:   Patient will continue with the current medication.  Dietary changes, exercise, and maintenance of healthy weight were discussed at length.  Lab Results   Component Value Date    CHOL 117 12/30/2024    HDL 43.9 12/30/2024    LDLCALC 49 12/30/2024    TRIG 120 12/30/2024    CHHDL 2.7 12/30/2024    VLDL 24 12/30/2024    NHDL 73 12/30/2024       Diabetes mellitus type 2 with neuropathy and without long-term insulin use:  Stable based on labs.  Lab Results   Component Value Date    HGBA1C 7.0 (H) 12/30/2024   [facility] was:  6.8%.  Continue current medications.     Obstructive sleep apnea: Stable based on symptoms.  He will continue nightly use of his CPAP machine.  He is compliant with nightly use of CPAP use and has been on CPAP for years now.     Degenerative spinal stenosis at L4/L5:   Stable based on symptoms.  We will continue the Celebrex as needed.  Underwent emergency L2-5 decompression (discectomy/laminectomy) at [facility] in [place], [place] on 9/21/23.      BPH (Hx of Urinary retention):   Stable based on symptoms.  He will continue to follow up with urology as directed.   HE UNDERWENT LASER ENUCLEATION OF THE PROSTATE BY DR. VILLELA IN THE PAST.  Obstructive symptoms continue to be much improved.     Palpitations / PVCs:   Patient is currently on propranolol at bedtime.  This continues to work well for him.     Obesity: Dietary changes, exercise, and maintenance of a healthy weight were discussed at length.  Goal is to achieve a BMI less than 25.     Vitamin B 12 deficiency:  Pt takes a B12 supplement (5,000 mg) daily.        COLOGUARD OVERDUE (ordered 4/2/2024 but not yet completed).  Encouraged to complete in near future as  he has the kit at home.  PS[facility] (1.14) Follows with Dr. Lou (urology)       Deepthi Attestation  By signing my name below, I, Deepthi Hendricks   attest that this documentation has been prepared under the direction and in the presence of Rodney Maynard DO.    Orders Placed This Encounter   Procedures    Vitamin B12    Comprehensive Metabolic Panel    Lipid Panel    Albumin-Creatinine Ratio, Urine Random    Hemoglobin A1C       CONTINUE THE CURRENT MEDICATIONS:  Current Outpatient Medications   Medication Instructions    celecoxib (CELEBREX) 200 mg, Daily    gabapentin (NEURONTIN) 300 mg, oral, 2 times daily    icosapent ethyL (VASCEPA) 2 g, oral, 2 times daily (morning and late afternoon)    losartan (COZAAR) 25 mg, Daily    magnesium lactate CR (Magtab) 84 mg ER tablet 2 tablets, 2 times daily    metFORMIN (OSM) (FORTAMET) 1,000 mg, oral, 2 times daily (morning and late afternoon)    multivit-min-ferrous sulfate (One Daily Multi-Vit w-Mineral) 4.5 mg iron tablet TAKE  BY MOUTH EVERY DAY    OneTouch Delica Plus Lancet 33 gauge misc CHECK BLOOD GLUCOSE EVERY DAY    OneTouch Ultra Test strip CHECK BLOOD GLUCOSE EVERY DAY    OneTouch Ultra2 Meter misc TEST BLOOD GLUCOSE EVERY DAY    propranolol (INDERAL) 10 mg, Nightly    rosuvastatin (CRESTOR) 10 mg, Daily    semaglutide 2 mg, subcutaneous, Once Weekly

## 2025-04-24 NOTE — PATIENT INSTRUCTIONS
Follow up in 3 months.    It was a pleasure to see you today. Thank you for choosing us for your health care needs.    If you have lab or other testing completed and have not been informed of results within one week, please call the office for your results.    If you haven't done so, consider signing up for [facility] [employer], the [facility] personal health record. Ask the staff how you can get started.

## 2025-05-02 ENCOUNTER — APPOINTMENT (OUTPATIENT)
Dept: DERMATOLOGY | Facility: CLINIC | Age: 75
End: 2025-05-02
Payer: OTHER GOVERNMENT

## 2025-05-02 VITALS — HEART RATE: 80 BPM | DIASTOLIC BLOOD PRESSURE: 78 MMHG | SYSTOLIC BLOOD PRESSURE: 161 MMHG

## 2025-05-02 DIAGNOSIS — C44.111 BASAL CELL CARCINOMA (BCC) OF SKIN OF RIGHT EYELID INCLUDING CANTHUS, UNSPECIFIED EYELID: ICD-10-CM

## 2025-05-02 NOTE — PROGRESS NOTES
Mohs Surgery Operative Note    Date of Surgery:  5/2/2025  Surgeon:  Manuelito Almeida MD PhD  Office Location: [address]  [address] [address]  Dept: [phone]  Dept Fax: [phone]  Referring Provider: [facility]      Assessment/Plan   Pre-procedure:   Obtained informed consent: written from patient  The surgical site was identified and confirmed with the patient.     Intra-operative:   Audible time out called at : 10:10 AM 05/02/25  by: Alejandra Quintanilla RN   Verified patient name, birthdate, site, specimen bottle label & requisition.    The planned procedure(s) was again reviewed with the patient. The risks of bleeding, infection, nerve damage and scarring were reviewed. Written authorization was obtained. The patient identity, surgical site, and planned procedure(s) were verified. The provider acted as both surgeon and pathologist.     BASAL CELL CARCINOMA (BCC) OF SKIN OF RIGHT EYELID INCLUDING CANTHUS, UNSPECIFIED EYELID  Right Lower Eyelid  Mohs surgery    Consent obtained: written    Universal Protocol:  Procedure explained and questions answered to patient or proxy's satisfaction: Yes    Test results available and properly labeled: Yes    Pathology report reviewed: Yes    External notes reviewed: Yes    Photo or diagram used for site identification: Yes    Site/side marked: Yes    Slide independently reviewed by Mohs surgeon: Yes    Immediately prior to procedure a time out was called: Yes    Patient identity confirmed: verbally with patient  Preparation: Patient was prepped and draped in usual sterile fashion      Anticoagulation:  Is the patient taking prescription anticoagulant and/or aspirin prescribed/recommended by a physician? No    Was the anticoagulation regimen changed prior to Mohs? No      Anesthesia:  Anesthesia method: local infiltration  Local anesthetic: lidocaine 1% WITH epi    Procedure Details:  Case ID Number:  [id number]  Biopsy accession number: [id number]  Date of biopsy: 12/19/2024  Frozen section biopsy performed: No    Specimen debulked: Yes ([facility])    Pre-Op diagnosis: basal cell carcinoma  Surgical site (from skin exam): Right Lower Eyelid  Pre-operative length (cm): 0.7  Pre-operative width (cm): 0.3  Indications for Mohs surgery: anatomic location where tissue conservation is critical  Previously treated? No      Micrographic Surgery Details:  Post-operative length (cm): 2  Post-operative width (cm): 1  Number of Mohs stages: 2    Stage 1     Comments: The patient was brought into the operating room and placed in the procedure chair in the appropriate position.  The area positive by previous biopsy was identified and confirmed with the patient. The area of clinically obvious tumor was debulked using a curette and/or scalpel as needed. An incision was made following the Mohs approach through the skin. The specimen was taken to the lab, divided into 2 piece(s) and appropriately chromacoded and processed.    .  Tumor was seen on both the lateral and deep margins as indicated on the on the Mohs map.  Nodular basal cell carcinoma. Histologic examination revealed large tumor aggregates of atypical basaloid cells with peripheral palisading and tumoral clefting.              Tumor features identified on Mohs section: basal carcinoma    Stage 2     Comments: The area of positivity as noted on the Mohs map in the previous stage was identified and removed using the Mohs technique. The specimen was taken to the lab and appropriately chromacoded and processed in 1 piece(s).               Tumor features identified on Mohs section: no tumor identified    Depth of defect: subcutaneous fat    Patient tolerance of procedure: tolerated well, no immediate complications    Reconstruction:  Was the defect reconstructed? Yes    Was reconstruction performed by the same Mohs surgeon? Yes    Setting of reconstruction: outpatient  office  When was reconstruction performed? same day  Type of reconstruction: linear  Linear reconstruction: complex  Length of linear repair (cm): 3  Subcutaneous Layers (Deep Stitches)   Suture size:  5-0  Suture type:  Vicryl  Stitches:  Buried vertical mattress  Fine/surface layer approximation (top stitches)   Epidermal/Superficial suture size:  5-0  Epidermal/Superficial suture type:  Fast-absorbing gut  Stitches: simple running    Hemostasis achieved with: electrodesiccation  Outcome: patient tolerated procedure well with no complications    Post-procedure details: sterile dressing applied and wound care instructions given    Dressing type: Hypafix, pressure dressing, petrolatum and Telfa pad      Complex Linear Repair - Wide Undermining:  Given the location and size of the defect, it was determined that a complex layered linear closure was required to restore normal anatomy and function. The repair was considered complex because extensive undermining was required and performed. The amount of undermining performed was greater than the maximum width of the defect as measured perpendicular to the closure line along at least one entire edge of the defect. Standing cutaneous cones were removed using Burow's triangles. The wound edges were brought into close approximation with buried vertical mattress sutures. The remainder of the wound was then closed with epidermal top sutures.              The final repair measured 3 cm    Wound care was discussed, and the patient was given written post-operative wound care instructions.      The patient will follow up with Manuelito Almeida MD PhD as needed for any post operative problems or concerns, and will follow up with their primary dermatologist as scheduled.

## 2025-05-02 NOTE — PROGRESS NOTES
Office Visit Note  Date: 5/2/2025  Surgeon:  Manuelito Almeida MD PhD  Office Location: [address]  [address]  [facility] [address]  Dept: [phone]  Dept Fax: [phone]      Venessa Baldwin is a 74 y.o. male who presents for the following: MOHS Surgery (Right lateral lower eyelid, BCC)    According to the patient, the lesion has been present for approximately greater than 1 year at the time of diagnosis.  The lesion is not causing symptoms.  The lesion has not been treated previously.    The patient does not have a pacemaker / defibrillator.  The patient does have a heart valve / joint replacement.  Knee many years ago.  The patient is not on blood thinners.  The patient does not have a history of hepatitis B or C.  The patient does not have a history of HIV.  The patient does not have a history of immunosuppression (e.g. organ transplantation, malignancy, medications)    Review of Systems:  No other skin or systemic complaints other than what is documented elsewhere in the note.    MEDICAL HISTORY: clinically relevant history including significant past medical history, medications and allergies was reviewed and documented in Epic.    Objective   Well appearing patient in no apparent distress; mood and affect are within normal limits.  Vital signs: See record.  Noted on the   Right Lower Eyelid  Is a 0.7 x 0.3 cm scar      The patient confirmed the identified site.    Discussion:  The nature of the diagnosis was explained. The lesion is a skin cancer.  It has a risk of local growth and distant spread. The condition is associated with sun exposure.  Warning signs of non-melanoma skin cancer discussed. Patient was instructed to perform monthly self skin examination.  We recommended that the patient have regular full skin exams given an increased risk of subsequent skin cancers. The patient was instructed to use sun protective behaviors including use  of broad spectrum sunscreens and sun protective clothing to reduce risk of skin cancers.      Risks, benefits, side effects of Mohs surgery were discussed with patient and the patient voiced understanding.  It was explained that even though the cure rate of Mohs is very high it is not 100%. Risks of surgery including but not limited to bleeding, infection, numbness, nerve damage, and scar were reviewed.  Discussion included wound care requirements, activity restrictions, likely scar outcome and time to heal.     After Mohs surgery, the defect may need to be repaired surgically and the scar may be longer than the original lesion.  Reconstruction options, risks, and benefits were reviewed including second intention healing, linear repair (4-1 ratio was explained), local flaps, skin grafts, cartilage grafts and interpolation flaps (the need for multiple surgeries was explained). Possible outcomes were reviewed including likely scar appearance, failure of flap survival, infection, bleeding and the need for revision surgery.     The pathology was reviewed, the photograph was reviewed, and the referring physician's note was reviewed.    Patient elected for Mohs surgery.

## 2025-07-12 ENCOUNTER — OFFICE VISIT (OUTPATIENT)
Dept: URGENT CARE | Age: 75
End: 2025-07-12
Payer: OTHER GOVERNMENT

## 2025-07-12 VITALS
SYSTOLIC BLOOD PRESSURE: 130 MMHG | HEIGHT: 68 IN | BODY MASS INDEX: 30.31 KG/M2 | WEIGHT: 200 LBS | RESPIRATION RATE: 20 BRPM | HEART RATE: 65 BPM | DIASTOLIC BLOOD PRESSURE: 80 MMHG | OXYGEN SATURATION: 97 % | TEMPERATURE: 98.4 F

## 2025-07-12 DIAGNOSIS — J40 BRONCHITIS: Primary | ICD-10-CM

## 2025-07-12 DIAGNOSIS — J06.9 UPPER RESPIRATORY TRACT INFECTION, UNSPECIFIED TYPE: ICD-10-CM

## 2025-07-12 RX ORDER — BROMPHENIRAMINE MALEATE, PSEUDOEPHEDRINE HYDROCHLORIDE, AND DEXTROMETHORPHAN HYDROBROMIDE 2; 30; 10 MG/5ML; MG/5ML; MG/5ML
5 SYRUP ORAL EVERY 4 HOURS PRN
Qty: 120 ML | Refills: 0 | Status: SHIPPED | OUTPATIENT
Start: 2025-07-12

## 2025-07-12 RX ORDER — PREDNISONE 20 MG/1
20 TABLET ORAL 2 TIMES DAILY
Qty: 10 TABLET | Refills: 0 | Status: SHIPPED | OUTPATIENT
Start: 2025-07-12 | End: 2025-07-17

## 2025-07-12 RX ORDER — AZITHROMYCIN 250 MG/1
TABLET, FILM COATED ORAL
Qty: 6 TABLET | Refills: 0 | Status: SHIPPED | OUTPATIENT
Start: 2025-07-12

## 2025-07-12 ASSESSMENT — PAIN SCALES - GENERAL: PAINLEVEL_OUTOF10: 0-NO PAIN

## 2025-07-12 NOTE — PROGRESS NOTES
"Subjective   Patient ID: Nabor Baldwin is a 74 y.o. male who presents for Cough (Wheezing x 3 day ).  HPI  Presents for evaluation of URI.  Symptoms including cough, congestion, wheezing, and hoarseness have been present for several days and refractory to OTC meds.  No fever, chills, nausea, vomiting, abdominal pain, CP, or SOB.  No exacerbating factors    Review of Systems    Constitutional:  See HPI   ENT: See HPI  Respiratory: See HPI  Neurologic:  Alert and oriented X4, No numbness, No tingling.    All other systems are negative     Objective     /80 (BP Location: Left arm, Patient Position: Sitting)   Pulse 65   Temp 36.9 °C (98.4 °F) (Oral)   Resp 20   Ht 1.727 m (5' 8\")   Wt 90.7 kg (200 lb)   SpO2 97%   BMI 30.41 kg/m²     Physical Exam    General:  Alert and oriented, No acute distress.    Eye:  Pupils are equal, round and reactive to light, Normal conjunctiva.    HENT:  Normocephalic, hoarse voice noted; unremarkable oropharynx; no cervical adenopathy or sinus tenderness  Neck:  Supple    Respiratory: Respirations are non-labored; bilateral diffuse wheezing and rhonchi; no rales  Musculoskeletal: Normal ROM and strength  Integumentary:  Warm, Dry, Intact, No pallor, No rash.    Neurologic:  Alert, Oriented, Normal sensory, Cranial Nerves II-XII are grossly intact  Psychiatric:  Cooperative, Appropriate mood & affect.    Assessment/Plan   Exam is consistent with bronchitis and upper respiratory infection.  Prescriptions for Z-Inderjit, prednisone, and Bromfed.  Patient's clinical presentation is otherwise unremarkable at this time. Patient is discharged with instructions to follow-up with primary care or seek emergency medical attention for worsening symptoms or any new concerns.  Problem List Items Addressed This Visit    None  Visit Diagnoses         Bronchitis    -  Primary    Relevant Medications    azithromycin (Zithromax) 250 mg tablet    brompheniramine-pseudoeph-DM (Bromfed DM) 2-30-10 " mg/5 mL syrup    predniSONE (Deltasone) 20 mg tablet      Upper respiratory tract infection, unspecified type        Relevant Medications    azithromycin (Zithromax) 250 mg tablet    brompheniramine-pseudoeph-DM (Bromfed DM) 2-30-10 mg/5 mL syrup    predniSONE (Deltasone) 20 mg tablet            Final diagnoses:   [J40] Bronchitis   [J06.9] Upper respiratory tract infection, unspecified type

## 2025-07-24 DIAGNOSIS — E11.42 TYPE 2 DIABETES MELLITUS WITH DIABETIC POLYNEUROPATHY, WITHOUT LONG-TERM CURRENT USE OF INSULIN: ICD-10-CM

## 2025-07-24 DIAGNOSIS — E53.8 VITAMIN B 12 DEFICIENCY: ICD-10-CM

## 2025-07-24 DIAGNOSIS — E78.2 HYPERLIPIDEMIA, MIXED: ICD-10-CM

## 2025-07-24 LAB
ALBUMIN SERPL-MCNC: 4.2 G/DL (ref 3.6–5.1)
ALBUMIN/CREAT UR: 12 MG/G CREAT
ALP SERPL-CCNC: 69 U/L (ref 35–144)
ALT SERPL-CCNC: 23 U/L (ref 9–46)
ANION GAP SERPL CALCULATED.4IONS-SCNC: 8 MMOL/L (CALC) (ref 7–17)
AST SERPL-CCNC: 25 U/L (ref 10–35)
BILIRUB SERPL-MCNC: 0.5 MG/DL (ref 0.2–1.2)
BUN SERPL-MCNC: 17 MG/DL (ref 7–25)
CALCIUM SERPL-MCNC: 9.4 MG/DL (ref 8.6–10.3)
CHLORIDE SERPL-SCNC: 102 MMOL/L (ref 98–110)
CHOLEST SERPL-MCNC: 138 MG/DL
CHOLEST/HDLC SERPL: 2.9 (CALC)
CO2 SERPL-SCNC: 28 MMOL/L (ref 20–32)
CREAT SERPL-MCNC: 0.83 MG/DL (ref 0.7–1.28)
CREAT UR-MCNC: 100 MG/DL (ref 20–320)
EGFRCR SERPLBLD CKD-EPI 2021: 92 ML/MIN/1.73M2
EST. AVERAGE GLUCOSE BLD GHB EST-MCNC: 192 MG/DL
EST. AVERAGE GLUCOSE BLD GHB EST-SCNC: 10.6 MMOL/L
GLUCOSE SERPL-MCNC: 186 MG/DL (ref 65–99)
HBA1C MFR BLD: 8.3 %
HDLC SERPL-MCNC: 48 MG/DL
LDLC SERPL CALC-MCNC: 63 MG/DL (CALC)
MICROALBUMIN UR-MCNC: 1.2 MG/DL
NONHDLC SERPL-MCNC: 90 MG/DL (CALC)
POTASSIUM SERPL-SCNC: 4.8 MMOL/L (ref 3.5–5.3)
PROT SERPL-MCNC: 6.5 G/DL (ref 6.1–8.1)
SODIUM SERPL-SCNC: 138 MMOL/L (ref 135–146)
TRIGL SERPL-MCNC: 202 MG/DL
VIT B12 SERPL-MCNC: 1024 PG/ML (ref 200–1100)

## 2025-07-29 ENCOUNTER — HOSPITAL ENCOUNTER (OUTPATIENT)
Dept: RADIOLOGY | Facility: HOSPITAL | Age: 75
Discharge: HOME | End: 2025-07-29
Payer: MEDICARE

## 2025-07-29 ENCOUNTER — APPOINTMENT (OUTPATIENT)
Dept: PRIMARY CARE | Facility: CLINIC | Age: 75
End: 2025-07-29
Payer: OTHER GOVERNMENT

## 2025-07-29 VITALS
HEART RATE: 64 BPM | OXYGEN SATURATION: 96 % | HEIGHT: 68 IN | DIASTOLIC BLOOD PRESSURE: 79 MMHG | WEIGHT: 208.9 LBS | BODY MASS INDEX: 31.66 KG/M2 | SYSTOLIC BLOOD PRESSURE: 132 MMHG | TEMPERATURE: 98.2 F

## 2025-07-29 DIAGNOSIS — M79.644 PAIN OF FINGER OF RIGHT HAND: ICD-10-CM

## 2025-07-29 DIAGNOSIS — R00.2 PALPITATIONS: ICD-10-CM

## 2025-07-29 DIAGNOSIS — I49.3 PVC (PREMATURE VENTRICULAR CONTRACTION): ICD-10-CM

## 2025-07-29 DIAGNOSIS — I10 PRIMARY HYPERTENSION: Primary | ICD-10-CM

## 2025-07-29 DIAGNOSIS — M48.061 DEGENERATIVE LUMBAR SPINAL STENOSIS: ICD-10-CM

## 2025-07-29 DIAGNOSIS — E53.8 VITAMIN B 12 DEFICIENCY: ICD-10-CM

## 2025-07-29 DIAGNOSIS — N13.8 BENIGN LOCALIZED HYPERPLASIA OF PROSTATE WITH URINARY OBSTRUCTION: ICD-10-CM

## 2025-07-29 DIAGNOSIS — E66.811 CLASS 1 OBESITY WITH SERIOUS COMORBIDITY AND BODY MASS INDEX (BMI) OF 30.0 TO 30.9 IN ADULT, UNSPECIFIED OBESITY TYPE: ICD-10-CM

## 2025-07-29 DIAGNOSIS — G47.33 OSA ON CPAP: ICD-10-CM

## 2025-07-29 DIAGNOSIS — E11.42 TYPE 2 DIABETES MELLITUS WITH DIABETIC POLYNEUROPATHY, WITHOUT LONG-TERM CURRENT USE OF INSULIN: ICD-10-CM

## 2025-07-29 DIAGNOSIS — N40.1 BENIGN LOCALIZED HYPERPLASIA OF PROSTATE WITH URINARY OBSTRUCTION: ICD-10-CM

## 2025-07-29 DIAGNOSIS — E78.2 HYPERLIPIDEMIA, MIXED: ICD-10-CM

## 2025-07-29 PROBLEM — E66.3 OVERWEIGHT: Status: ACTIVE | Noted: 2025-07-29

## 2025-07-29 PROBLEM — D47.2 MONOCLONAL GAMMOPATHY: Status: ACTIVE | Noted: 2025-07-29

## 2025-07-29 PROCEDURE — 3078F DIAST BP <80 MM HG: CPT | Performed by: FAMILY MEDICINE

## 2025-07-29 PROCEDURE — 1125F AMNT PAIN NOTED PAIN PRSNT: CPT | Performed by: FAMILY MEDICINE

## 2025-07-29 PROCEDURE — G2211 COMPLEX E/M VISIT ADD ON: HCPCS | Performed by: FAMILY MEDICINE

## 2025-07-29 PROCEDURE — 73140 X-RAY EXAM OF FINGER(S): CPT | Mod: RT

## 2025-07-29 PROCEDURE — 4010F ACE/ARB THERAPY RXD/TAKEN: CPT | Performed by: FAMILY MEDICINE

## 2025-07-29 PROCEDURE — 1159F MED LIST DOCD IN RCRD: CPT | Performed by: FAMILY MEDICINE

## 2025-07-29 PROCEDURE — 3008F BODY MASS INDEX DOCD: CPT | Performed by: FAMILY MEDICINE

## 2025-07-29 PROCEDURE — 3075F SYST BP GE 130 - 139MM HG: CPT | Performed by: FAMILY MEDICINE

## 2025-07-29 PROCEDURE — 99213 OFFICE O/P EST LOW 20 MIN: CPT | Performed by: FAMILY MEDICINE

## 2025-07-29 PROCEDURE — 1160F RVW MEDS BY RX/DR IN RCRD: CPT | Performed by: FAMILY MEDICINE

## 2025-07-29 PROCEDURE — 73140 X-RAY EXAM OF FINGER(S): CPT | Mod: RIGHT SIDE | Performed by: RADIOLOGY

## 2025-07-29 ASSESSMENT — PAIN SCALES - GENERAL: PAINLEVEL_OUTOF10: 2

## 2025-07-29 ASSESSMENT — PATIENT HEALTH QUESTIONNAIRE - PHQ9
SUM OF ALL RESPONSES TO PHQ9 QUESTIONS 1 AND 2: 0
1. LITTLE INTEREST OR PLEASURE IN DOING THINGS: NOT AT ALL
2. FEELING DOWN, DEPRESSED OR HOPELESS: NOT AT ALL

## 2025-07-29 NOTE — PATIENT INSTRUCTIONS
[facility] consider Monjurao (injection) or Rybelsus (oral) to replace the Ozempic.    Next [facility] at [facility].    Please complete the COLOGUARD as discussed.    Follow up in 3 months.    It was a pleasure to see you today. Thank you for choosing us for your health care needs.    If you have lab or other testing completed and have not been informed of results within one week, please call the office for your results.    If you haven't done so, consider signing up for [facility] [employer], the [facility] personal health record. Ask the staff how you can get started.

## 2025-07-29 NOTE — PROGRESS NOTES
Subjective   Patient ID: Nabor Baldwin is a 74 y.o. male who presents for Follow-up.      HPI     Patient c/o dislocating the 5th digit on his right hand after working in the yard.   Patient states he cut to short going around some ply wood he was cutting and fell on his hand and side yesterday.    Patient states he popped his finger back in, says swollen now.   Has applied a wrap and popsicle stick to the finger to immobilize it.   States the finger does ache.   Denies pain when moving the finger.     Patient went to the Urgent Care 7/12/2025 and was diagnosed with bronchitis due to breathing in bromine fumes the day before.   Was prescribed azithromycin, Bromfed DM, and prednisone.  States he is doing better today.     Patient is no longer taking Ozempic due to irregular bowel movements.       Review labs: 07/23/2025  Colonoscopy: 07/2020  PSA: 12/30/2024          He has hypertension.  Patient does monitor his BP at home occasionally.  Denies SOB, CP, and dizziness.  Patient is compliant with his antihypertensive therapy and denies any noted side effects.     He has hyperlipidemia.  Patient tries to limit the amount of fatty foods and high cholesterol foods that he consumes.  Patient is compliant with his statin therapy and denies any currently noted side effects.     He has DM Type 2 with diabetic neuropathy.  The patient does try to limit the amount of carbohydrates that he consumes in his diet.  He does monitor sugars at home occasionally but not on an everyday basis.  Neuropathy symptoms in feet bilaterally has been stable.     Patient has degenerative lumbar spinal stenosis.   Pt continues to utilize Celebrex with good results.  Underwent emergency surgery in [place] after losing control of his legs.  Pt underwent L2-5 decompression (discectomy/laminectomy) at OSH in [place], [place] on 9/21/23.  He does has some residual right foot drop      He has obstructive sleep apnea.  Patient is compliant with the use  "of his CPAP on a nightly basis.  Patient continues to benefit from CPAP therapy.  He does awaken feeling refreshed.     Patient has BPH with a history of urinary retention.  He underwent HOLMIUM LASER ENUCLEATION OF THE PROSTATE 2/4/22 by Dr. Lou.  He continues to have significant reduction in obstructive symptoms.     Patient previously experienced palpitations.  Cardiology diagnosed him with PVCs and started him on propranolol at bedtime.  This continues to work well with suppressing the PVCs/palpitations.        Review of Systems  Constitutional: Patient denies any fever, chills, loss of appetite, or unexplained weight loss.  Cardiovascular: Patient denies any chest pain, shortness of breath with exertion, tachycardia, palpitations, orthopnea, or paroxysmal nocturnal dyspnea.  Respiratory: Patient denies any cough, shortness of breath, or wheezing.  Gastrointestinal: Patient denies any nausea, vomiting, diarrhea, constipation, melena, hematochezia, or reflux symptoms  Skin: Denies any rashes or skin lesions  Neurology: Patient denies any new motor or sensory losses. Denies any numbness, tingling, weakness, and incoordination of the extremities. Patient also denies any tremor, seizures.  Endocrinology: Denies any polyuria, polydipsia, polyphagia, or heat/cold intolerance.    MS: Swollen 5th digit on right hand due to dislocation. Finger does ache.     SEE HPI ALSO    Objective   /79 (BP Location: Right arm, Patient Position: Sitting, BP Cuff Size: Adult)   Pulse 64   Temp 36.8 °C (98.2 °F) (Temporal)   Ht 1.727 m (5' 8\")   Wt 94.8 kg (208 lb 14.4 oz)   SpO2 96%   BMI 31.76 kg/m²     Physical Exam  General Appearance: Alert and cooperative, in no acute distress, well-developed/well-nourished obese male ambulating with a cane.    Neck: Supple and without adenopathy or rigidity. There is no JVD at 90° and no carotid bruits are noted. There is no thyromegaly, thyroid tenderness, or palpable thyroid " "nodules.  Heart: Regular rate and rhythm without murmur or ectopy.  Lungs: Clear to auscultation bilaterally with good air exchange.  Skin: Good turgor, moist, warm and without rashes or lesions.  Neurological exam: Alert and oriented ×3, no tremor.    Extremities: No clubbing, cyanosis, or edema.  AFO noted to right lower leg.     MS: Home-made finger splint applied to the right 5th finger with tongue blade and tape. Mild swelling along the 5th metacarpal. No obvious deformity, N/V intact.    Assessment/Plan     HTN:    Stable on in office readings.  Blood pressure appears adequately controlled and we will continue with the current antihypertensive therapy.    Mixed hyperlipidemia:   Patient will continue with the current medication.  Dietary changes, exercise, and maintenance of healthy weight were discussed at length.  Lab Results   Component Value Date    CHOL 138 07/23/2025    CHOL 117 12/30/2024    CHOL 132 08/12/2023     Lab Results   Component Value Date    HDL 48 07/23/2025    HDL 43.9 12/30/2024    HDL 40.2 08/12/2023     Lab Results   Component Value Date    LDLCALC 63 07/23/2025    LDLCALC 49 12/30/2024     Lab Results   Component Value Date    TRIG 202 (H) 07/23/2025    TRIG 120 12/30/2024    TRIG 248 (H) 08/12/2023     No components found for: \"CHOLHDL\"      Diabetes mellitus type 2 with neuropathy and without long-term insulin use:  A1c has increased and is not at goal.  Patient was taking prednisone for 5 days due to bronchitis.   Patient has discontinued Ozempic due to irregular bowel movements.   [facility] Mounjaro or Rybelsus to replace Ozempic.   Lab Results   Component Value Date    HGBA1C 8.3 (H) 07/23/2025   Continue current medications.     Obstructive sleep apnea:   Stable based on symptoms.  He will continue nightly use of his CPAP machine.  He is compliant with nightly use of CPAP use and has been on CPAP for years now.     Degenerative spinal stenosis at " L4/L5:   Stable based on symptoms.  We will continue the Celebrex as needed.  Underwent emergency L2-5 decompression (discectomy/laminectomy) at OSH in [place], [place] on 9/21/23.      BPH (Hx of Urinary retention):   Stable based on symptoms.  He will continue to follow up with urology as directed.   HE UNDERWENT LASER ENUCLEATION OF THE PROSTATE BY DR. VILLELA IN THE PAST.  Obstructive symptoms continue to be much improved.     Palpitations / PVCs:   Patient is currently on propranolol at bedtime.  This continues to work well for him.     Vitamin B 12 deficiency:  Pt takes a B12 supplement (5,000 mg) daily.     Obesity:   Dietary changes, exercise, and maintenance of a healthy weight were discussed at length.  Goal is to achieve a BMI less than 25.     Pain of finger of right hand:  Ordered an XR of the 5th digit on the right hand.       COLOGUARD OVERDUE (ordered 4/2/2024 but not yet completed).  Encouraged to complete in near future as he has the kit at home.    [facility] (1.[facility]) Follows with Dr. Villela (urology)     Follow up in 3 months.      Scribe Attestation  By signing my name below, I, Deepthi Hendricks   attest that this documentation has been prepared under the direction and in the presence of Rodney Maynard DO.    Orders Placed This Encounter   Procedures    XR fingers right 2+ views

## 2025-08-27 ENCOUNTER — OFFICE VISIT (OUTPATIENT)
Dept: CARDIOLOGY | Facility: CLINIC | Age: 75
End: 2025-08-27
Payer: OTHER GOVERNMENT

## 2025-08-27 VITALS
BODY MASS INDEX: 31.52 KG/M2 | DIASTOLIC BLOOD PRESSURE: 79 MMHG | SYSTOLIC BLOOD PRESSURE: 124 MMHG | HEIGHT: 68 IN | HEART RATE: 77 BPM | WEIGHT: 208 LBS

## 2025-08-27 DIAGNOSIS — E78.2 HYPERLIPIDEMIA, MIXED: Primary | ICD-10-CM

## 2025-08-27 DIAGNOSIS — I49.3 PVC (PREMATURE VENTRICULAR CONTRACTION): ICD-10-CM

## 2025-08-27 DIAGNOSIS — I10 PRIMARY HYPERTENSION: ICD-10-CM

## 2025-08-27 DIAGNOSIS — R00.2 PALPITATIONS: ICD-10-CM

## 2025-08-27 PROCEDURE — 99212 OFFICE O/P EST SF 10 MIN: CPT | Mod: 25

## 2025-08-27 PROCEDURE — 93005 ELECTROCARDIOGRAM TRACING: CPT | Performed by: INTERNAL MEDICINE

## 2025-08-27 RX ORDER — SEMAGLUTIDE 1.34 MG/ML
1 INJECTION, SOLUTION SUBCUTANEOUS WEEKLY
COMMUNITY

## 2025-08-27 ASSESSMENT — PAIN SCALES - GENERAL: PAINLEVEL_OUTOF10: 0-NO PAIN

## 2025-08-27 ASSESSMENT — ENCOUNTER SYMPTOMS
OCCASIONAL FEELINGS OF UNSTEADINESS: 0
DEPRESSION: 0
LOSS OF SENSATION IN FEET: 0

## 2025-08-29 LAB
ATRIAL RATE: 75 BPM
P AXIS: -19 DEGREES
P OFFSET: 147 MS
P ONSET: 95 MS
PR INTERVAL: 230 MS
Q ONSET: 210 MS
QRS COUNT: 12 BEATS
QRS DURATION: 96 MS
QT INTERVAL: 370 MS
QTC CALCULATION(BAZETT): 413 MS
QTC FREDERICIA: 398 MS
R AXIS: -30 DEGREES
T AXIS: 40 DEGREES
T OFFSET: 395 MS
VENTRICULAR RATE: 75 BPM

## 2025-11-18 ENCOUNTER — APPOINTMENT (OUTPATIENT)
Dept: PRIMARY CARE | Facility: CLINIC | Age: 75
End: 2025-11-18
Payer: MEDICARE